# Patient Record
Sex: FEMALE | Race: WHITE | NOT HISPANIC OR LATINO | Employment: UNEMPLOYED | URBAN - METROPOLITAN AREA
[De-identification: names, ages, dates, MRNs, and addresses within clinical notes are randomized per-mention and may not be internally consistent; named-entity substitution may affect disease eponyms.]

---

## 2017-03-14 ENCOUNTER — HISTORICAL (OUTPATIENT)
Dept: FAMILY MEDICINE | Facility: CLINIC | Age: 72
End: 2017-03-14

## 2017-03-23 ENCOUNTER — HISTORICAL (OUTPATIENT)
Dept: ENDOSCOPY | Facility: HOSPITAL | Age: 72
End: 2017-03-23

## 2017-03-23 LAB — CRC RECOMMENDATION EXT: NORMAL

## 2017-04-17 ENCOUNTER — HISTORICAL (OUTPATIENT)
Dept: ADMINISTRATIVE | Facility: HOSPITAL | Age: 72
End: 2017-04-17

## 2017-05-23 ENCOUNTER — HISTORICAL (OUTPATIENT)
Dept: MEDSURG UNIT | Facility: HOSPITAL | Age: 72
End: 2017-05-23

## 2017-06-16 ENCOUNTER — HISTORICAL (OUTPATIENT)
Dept: ADMINISTRATIVE | Facility: HOSPITAL | Age: 72
End: 2017-06-16

## 2017-06-16 LAB
ABS NEUT (OLG): 2.46 X10(3)/MCL (ref 2.1–9.2)
ALBUMIN SERPL-MCNC: 4.3 GM/DL (ref 3.4–5)
ALBUMIN/GLOB SERPL: 1 {RATIO}
ALP SERPL-CCNC: 106 UNIT/L (ref 20–120)
ALT SERPL-CCNC: 21 UNIT/L
AST SERPL-CCNC: 26 UNIT/L
BASOPHILS # BLD AUTO: 0.03 X10(3)/MCL
BASOPHILS NFR BLD AUTO: 0 % (ref 0–1)
BILIRUB SERPL-MCNC: 1 MG/DL
BILIRUBIN DIRECT+TOT PNL SERPL-MCNC: 0.1 MG/DL
BILIRUBIN DIRECT+TOT PNL SERPL-MCNC: 0.9 MG/DL
BUN SERPL-MCNC: 19 MG/DL (ref 7–25)
CALCIUM SERPL-MCNC: 9.4 MG/DL (ref 8.4–10.3)
CHLORIDE SERPL-SCNC: 106 MMOL/L (ref 96–110)
CO2 SERPL-SCNC: 26 MMOL/L (ref 24–32)
CREAT SERPL-MCNC: 0.65 MG/DL (ref 0.7–1.1)
DEPRECATED CALCIDIOL+CALCIFEROL SERPL-MC: 20.78 NG/ML (ref 30–80)
EOSINOPHIL # BLD AUTO: 0.17 10*3/UL
EOSINOPHIL NFR BLD AUTO: 3 % (ref 0–5)
ERYTHROCYTE [DISTWIDTH] IN BLOOD BY AUTOMATED COUNT: 12.7 % (ref 11.5–14.5)
GLOBULIN SER-MCNC: 3.1 GM/ML (ref 2.3–3.5)
GLUCOSE SERPL-MCNC: 95 MG/DL (ref 65–99)
HCT VFR BLD AUTO: 40.3 % (ref 35–46)
HGB BLD-MCNC: 13.1 GM/DL (ref 12–16)
IMM GRANULOCYTES # BLD AUTO: 0.01 10*3/UL
IMM GRANULOCYTES NFR BLD AUTO: 0 %
LYMPHOCYTES # BLD AUTO: 3.09 X10(3)/MCL
LYMPHOCYTES NFR BLD AUTO: 50 % (ref 15–40)
MCH RBC QN AUTO: 29.2 PG (ref 26–34)
MCHC RBC AUTO-ENTMCNC: 32.5 GM/DL (ref 31–37)
MCV RBC AUTO: 89.8 FL (ref 80–100)
MONOCYTES # BLD AUTO: 0.38 X10(3)/MCL
MONOCYTES NFR BLD AUTO: 6 % (ref 4–12)
NEUTROPHILS # BLD AUTO: 2.46 X10(3)/MCL
NEUTROPHILS NFR BLD AUTO: 40 X10(3)/MCL
PLATELET # BLD AUTO: 215 X10(3)/MCL (ref 130–400)
PMV BLD AUTO: 10.2 FL (ref 7.4–10.4)
POTASSIUM SERPL-SCNC: 4.1 MMOL/L (ref 3.6–5.2)
PROT SERPL-MCNC: 7.4 GM/DL (ref 6–8)
RBC # BLD AUTO: 4.49 X10(6)/MCL (ref 4–5.2)
SODIUM SERPL-SCNC: 140 MMOL/L (ref 135–146)
WBC # SPEC AUTO: 6.1 X10(3)/MCL (ref 4.5–11)

## 2017-06-30 ENCOUNTER — HISTORICAL (OUTPATIENT)
Dept: FAMILY MEDICINE | Facility: CLINIC | Age: 72
End: 2017-06-30

## 2017-09-14 ENCOUNTER — HISTORICAL (OUTPATIENT)
Dept: FAMILY MEDICINE | Facility: CLINIC | Age: 72
End: 2017-09-14

## 2017-11-03 ENCOUNTER — HISTORICAL (OUTPATIENT)
Dept: ADMINISTRATIVE | Facility: HOSPITAL | Age: 72
End: 2017-11-03

## 2017-11-03 LAB — DEPRECATED CALCIDIOL+CALCIFEROL SERPL-MC: 36.41 NG/ML (ref 30–80)

## 2017-12-18 ENCOUNTER — HISTORICAL (OUTPATIENT)
Dept: ADMINISTRATIVE | Facility: HOSPITAL | Age: 72
End: 2017-12-18

## 2017-12-18 ENCOUNTER — HISTORICAL (OUTPATIENT)
Dept: RADIOLOGY | Facility: HOSPITAL | Age: 72
End: 2017-12-18

## 2017-12-18 LAB
ABS NEUT (OLG): 3.63 X10(3)/MCL (ref 2.1–9.2)
ALBUMIN SERPL-MCNC: 4.1 GM/DL (ref 3.4–5)
ALBUMIN/GLOB SERPL: 1 RATIO (ref 1–2)
ALP SERPL-CCNC: 138 UNIT/L (ref 45–117)
ALT SERPL-CCNC: 33 UNIT/L (ref 12–78)
AST SERPL-CCNC: 26 UNIT/L (ref 15–37)
BASOPHILS # BLD AUTO: 0.04 X10(3)/MCL
BASOPHILS NFR BLD AUTO: 1 % (ref 0–1)
BILIRUB SERPL-MCNC: 0.8 MG/DL (ref 0.2–1)
BILIRUBIN DIRECT+TOT PNL SERPL-MCNC: 0.2 MG/DL
BILIRUBIN DIRECT+TOT PNL SERPL-MCNC: 0.6 MG/DL
BUN SERPL-MCNC: 18 MG/DL (ref 7–18)
CALCIUM SERPL-MCNC: 9.1 MG/DL (ref 8.5–10.1)
CHLORIDE SERPL-SCNC: 106 MMOL/L (ref 98–107)
CK MB SERPL-MCNC: 3.3 NG/ML (ref 1–3.6)
CK SERPL-CCNC: 163 UNIT/L (ref 26–192)
CO2 SERPL-SCNC: 28 MMOL/L (ref 21–32)
CREAT SERPL-MCNC: 0.7 MG/DL (ref 0.6–1.3)
EOSINOPHIL # BLD AUTO: 0.12 X10(3)/MCL
EOSINOPHIL NFR BLD AUTO: 2 % (ref 0–5)
ERYTHROCYTE [DISTWIDTH] IN BLOOD BY AUTOMATED COUNT: 13.2 % (ref 11.5–14.5)
GLOBULIN SER-MCNC: 4 GM/ML (ref 2.3–3.5)
GLUCOSE SERPL-MCNC: 94 MG/DL (ref 74–106)
HCT VFR BLD AUTO: 44.7 % (ref 35–46)
HGB BLD-MCNC: 14.5 GM/DL (ref 12–16)
IMM GRANULOCYTES # BLD AUTO: 0.02 10*3/UL
IMM GRANULOCYTES NFR BLD AUTO: 0 %
LYMPHOCYTES # BLD AUTO: 2.97 X10(3)/MCL
LYMPHOCYTES NFR BLD AUTO: 41 % (ref 15–40)
MAGNESIUM SERPL-MCNC: 2.8 MG/DL (ref 1.8–2.4)
MCH RBC QN AUTO: 30 PG (ref 26–34)
MCHC RBC AUTO-ENTMCNC: 32.4 GM/DL (ref 31–37)
MCV RBC AUTO: 92.5 FL (ref 80–100)
MONOCYTES # BLD AUTO: 0.47 X10(3)/MCL
MONOCYTES NFR BLD AUTO: 6 % (ref 4–12)
NEUTROPHILS # BLD AUTO: 3.63 X10(3)/MCL
NEUTROPHILS NFR BLD AUTO: 50 X10(3)/MCL
PLATELET # BLD AUTO: 268 X10(3)/MCL (ref 130–400)
PMV BLD AUTO: 10 FL (ref 7.4–10.4)
POTASSIUM SERPL-SCNC: 4.3 MMOL/L (ref 3.5–5.1)
PROT SERPL-MCNC: 8.1 GM/DL (ref 6.4–8.2)
RBC # BLD AUTO: 4.83 X10(6)/MCL (ref 4–5.2)
SODIUM SERPL-SCNC: 140 MMOL/L (ref 136–145)
TROPONIN I SERPL-MCNC: <0.015 NG/ML (ref 0–0.05)
WBC # SPEC AUTO: 7.2 X10(3)/MCL (ref 4.5–11)

## 2018-02-01 ENCOUNTER — HISTORICAL (OUTPATIENT)
Dept: ADMINISTRATIVE | Facility: HOSPITAL | Age: 73
End: 2018-02-01

## 2018-03-01 ENCOUNTER — HISTORICAL (OUTPATIENT)
Dept: ADMINISTRATIVE | Facility: HOSPITAL | Age: 73
End: 2018-03-01

## 2018-03-01 LAB
ALBUMIN SERPL-MCNC: 3.9 GM/DL (ref 3.4–5)
ALBUMIN/GLOB SERPL: 1 RATIO (ref 1–2)
ALP SERPL-CCNC: 132 UNIT/L (ref 45–117)
ALT SERPL-CCNC: 29 UNIT/L (ref 12–78)
AST SERPL-CCNC: 18 UNIT/L (ref 15–37)
BILIRUB SERPL-MCNC: 0.7 MG/DL (ref 0.2–1)
BILIRUBIN DIRECT+TOT PNL SERPL-MCNC: 0.2 MG/DL
BILIRUBIN DIRECT+TOT PNL SERPL-MCNC: 0.5 MG/DL
BUN SERPL-MCNC: 26 MG/DL (ref 7–18)
CALCIUM SERPL-MCNC: 9.4 MG/DL (ref 8.5–10.1)
CHLORIDE SERPL-SCNC: 106 MMOL/L (ref 98–107)
CO2 SERPL-SCNC: 29 MMOL/L (ref 21–32)
CREAT SERPL-MCNC: 0.7 MG/DL (ref 0.6–1.3)
ERYTHROCYTE [DISTWIDTH] IN BLOOD BY AUTOMATED COUNT: 13.1 % (ref 11.5–14.5)
GLOBULIN SER-MCNC: 3.8 GM/ML (ref 2.3–3.5)
GLUCOSE SERPL-MCNC: 82 MG/DL (ref 74–106)
HCT VFR BLD AUTO: 39.9 % (ref 35–46)
HGB BLD-MCNC: 12.6 GM/DL (ref 12–16)
MCH RBC QN AUTO: 29.4 PG (ref 26–34)
MCHC RBC AUTO-ENTMCNC: 31.6 GM/DL (ref 31–37)
MCV RBC AUTO: 93 FL (ref 80–100)
PLATELET # BLD AUTO: 251 X10(3)/MCL (ref 130–400)
PMV BLD AUTO: 10.7 FL (ref 7.4–10.4)
POTASSIUM SERPL-SCNC: 4.3 MMOL/L (ref 3.5–5.1)
PROT SERPL-MCNC: 7.7 GM/DL (ref 6.4–8.2)
RBC # BLD AUTO: 4.29 X10(6)/MCL (ref 4–5.2)
SODIUM SERPL-SCNC: 143 MMOL/L (ref 136–145)
WBC # SPEC AUTO: 6.4 X10(3)/MCL (ref 4.5–11)

## 2018-03-21 ENCOUNTER — HISTORICAL (OUTPATIENT)
Dept: FAMILY MEDICINE | Facility: CLINIC | Age: 73
End: 2018-03-21

## 2018-05-16 ENCOUNTER — HISTORICAL (OUTPATIENT)
Dept: ADMINISTRATIVE | Facility: HOSPITAL | Age: 73
End: 2018-05-16

## 2018-05-16 LAB
ALBUMIN SERPL-MCNC: 3.8 GM/DL (ref 3.4–5)
ALBUMIN/GLOB SERPL: 1 RATIO (ref 1–2)
ALP SERPL-CCNC: 128 UNIT/L (ref 45–117)
ALT SERPL-CCNC: 28 UNIT/L (ref 12–78)
AST SERPL-CCNC: 23 UNIT/L (ref 15–37)
BILIRUB SERPL-MCNC: 0.6 MG/DL (ref 0.2–1)
BILIRUBIN DIRECT+TOT PNL SERPL-MCNC: 0.2 MG/DL
BILIRUBIN DIRECT+TOT PNL SERPL-MCNC: 0.4 MG/DL
BUN SERPL-MCNC: 19 MG/DL (ref 7–18)
CALCIUM SERPL-MCNC: 8.8 MG/DL (ref 8.5–10.1)
CHLORIDE SERPL-SCNC: 108 MMOL/L (ref 98–107)
CO2 SERPL-SCNC: 28 MMOL/L (ref 21–32)
CREAT SERPL-MCNC: 0.7 MG/DL (ref 0.6–1.3)
ERYTHROCYTE [DISTWIDTH] IN BLOOD BY AUTOMATED COUNT: 13.5 % (ref 11.5–14.5)
GLOBULIN SER-MCNC: 3.7 GM/ML (ref 2.3–3.5)
GLUCOSE SERPL-MCNC: 108 MG/DL (ref 74–106)
HCT VFR BLD AUTO: 38.2 % (ref 35–46)
HGB BLD-MCNC: 12.1 GM/DL (ref 12–16)
MCH RBC QN AUTO: 29.7 PG (ref 26–34)
MCHC RBC AUTO-ENTMCNC: 31.7 GM/DL (ref 31–37)
MCV RBC AUTO: 93.9 FL (ref 80–100)
PLATELET # BLD AUTO: 241 X10(3)/MCL (ref 130–400)
PMV BLD AUTO: 10.5 FL (ref 7.4–10.4)
POTASSIUM SERPL-SCNC: 3.7 MMOL/L (ref 3.5–5.1)
PROT SERPL-MCNC: 7.5 GM/DL (ref 6.4–8.2)
RBC # BLD AUTO: 4.07 X10(6)/MCL (ref 4–5.2)
SODIUM SERPL-SCNC: 141 MMOL/L (ref 136–145)
WBC # SPEC AUTO: 4.7 X10(3)/MCL (ref 4.5–11)

## 2018-05-21 ENCOUNTER — HISTORICAL (OUTPATIENT)
Dept: SURGERY | Facility: HOSPITAL | Age: 73
End: 2018-05-21

## 2018-06-28 ENCOUNTER — HISTORICAL (OUTPATIENT)
Dept: RADIOLOGY | Facility: HOSPITAL | Age: 73
End: 2018-06-28

## 2018-06-28 ENCOUNTER — HISTORICAL (OUTPATIENT)
Dept: ADMINISTRATIVE | Facility: HOSPITAL | Age: 73
End: 2018-06-28

## 2019-03-26 ENCOUNTER — HISTORICAL (OUTPATIENT)
Dept: ADMINISTRATIVE | Facility: HOSPITAL | Age: 74
End: 2019-03-26

## 2019-05-15 ENCOUNTER — HISTORICAL (OUTPATIENT)
Dept: ADMINISTRATIVE | Facility: HOSPITAL | Age: 74
End: 2019-05-15

## 2019-10-11 ENCOUNTER — HISTORICAL (OUTPATIENT)
Dept: OPHTHALMOLOGY | Facility: CLINIC | Age: 74
End: 2019-10-11

## 2019-10-11 LAB
EST. AVERAGE GLUCOSE BLD GHB EST-MCNC: 117 MG/DL
HBA1C MFR BLD: 5.7 % (ref 4.2–6.3)

## 2019-11-06 ENCOUNTER — HISTORICAL (OUTPATIENT)
Dept: RADIOLOGY | Facility: HOSPITAL | Age: 74
End: 2019-11-06

## 2020-08-23 ENCOUNTER — HISTORICAL (OUTPATIENT)
Dept: ADMINISTRATIVE | Facility: HOSPITAL | Age: 75
End: 2020-08-23

## 2020-08-29 LAB — FINAL CULTURE: NORMAL

## 2020-09-19 ENCOUNTER — HISTORICAL (OUTPATIENT)
Dept: LAB | Facility: HOSPITAL | Age: 75
End: 2020-09-19

## 2020-10-12 ENCOUNTER — HISTORICAL (OUTPATIENT)
Dept: RADIOLOGY | Facility: HOSPITAL | Age: 75
End: 2020-10-12

## 2020-11-17 ENCOUNTER — HISTORICAL (OUTPATIENT)
Dept: ADMINISTRATIVE | Facility: HOSPITAL | Age: 75
End: 2020-11-17

## 2020-12-10 ENCOUNTER — HISTORICAL (OUTPATIENT)
Dept: ADMINISTRATIVE | Facility: HOSPITAL | Age: 75
End: 2020-12-10

## 2020-12-17 ENCOUNTER — HISTORICAL (OUTPATIENT)
Dept: ADMINISTRATIVE | Facility: HOSPITAL | Age: 75
End: 2020-12-17

## 2020-12-17 LAB
DEPRECATED CALCIDIOL+CALCIFEROL SERPL-MC: 39.6 NG/ML (ref 30–80)
EST. AVERAGE GLUCOSE BLD GHB EST-MCNC: 114 MG/DL
HAV IGM SERPL QL IA: NONREACTIVE
HBA1C MFR BLD: 5.6 %
HBV CORE IGM SERPL QL IA: NONREACTIVE
HBV SURFACE AG SERPL QL IA: NONREACTIVE
HCV AB SERPL QL IA: NONREACTIVE

## 2021-01-06 ENCOUNTER — HISTORICAL (OUTPATIENT)
Dept: RADIOLOGY | Facility: HOSPITAL | Age: 76
End: 2021-01-06

## 2021-07-02 ENCOUNTER — HISTORICAL (OUTPATIENT)
Dept: ADMINISTRATIVE | Facility: HOSPITAL | Age: 76
End: 2021-07-02

## 2021-07-02 LAB
ALBUMIN SERPL-MCNC: 4.2 GM/DL (ref 3.4–4.8)
ALBUMIN/GLOB SERPL: 1.2 RATIO (ref 1.1–2)
ALP SERPL-CCNC: 110 UNIT/L (ref 40–150)
ALT SERPL-CCNC: 25 UNIT/L (ref 0–55)
AST SERPL-CCNC: 26 UNIT/L (ref 5–34)
BILIRUB SERPL-MCNC: 1.5 MG/DL
BILIRUBIN DIRECT+TOT PNL SERPL-MCNC: 0.5 MG/DL (ref 0–0.5)
BILIRUBIN DIRECT+TOT PNL SERPL-MCNC: 1 MG/DL (ref 0–0.8)
BUN SERPL-MCNC: 15.8 MG/DL (ref 9.8–20.1)
BUN SERPL-MCNC: 15.9 MG/DL (ref 9.8–20.1)
CALCIUM SERPL-MCNC: 10 MG/DL (ref 8.4–10.2)
CALCIUM SERPL-MCNC: 10 MG/DL (ref 8.4–10.2)
CHLORIDE SERPL-SCNC: 105 MMOL/L (ref 98–107)
CHLORIDE SERPL-SCNC: 106 MMOL/L (ref 98–107)
CHOLEST SERPL-MCNC: 177 MG/DL
CHOLEST/HDLC SERPL: 3 {RATIO} (ref 0–5)
CK SERPL-CCNC: 194 U/L (ref 29–168)
CO2 SERPL-SCNC: 28 MMOL/L (ref 23–31)
CO2 SERPL-SCNC: 28 MMOL/L (ref 23–31)
CREAT SERPL-MCNC: 0.7 MG/DL (ref 0.55–1.02)
CREAT SERPL-MCNC: 0.72 MG/DL (ref 0.55–1.02)
CREAT/UREA NIT SERPL: 23
DEPRECATED CALCIDIOL+CALCIFEROL SERPL-MC: 50.8 NG/ML (ref 30–80)
GLOBULIN SER-MCNC: 3.5 GM/DL (ref 2.4–3.5)
GLUCOSE SERPL-MCNC: 93 MG/DL (ref 82–115)
GLUCOSE SERPL-MCNC: 93 MG/DL (ref 82–115)
HDLC SERPL-MCNC: 57 MG/DL (ref 35–60)
LDLC SERPL CALC-MCNC: 101 MG/DL (ref 50–140)
MAGNESIUM SERPL-MCNC: 2.4 MG/DL (ref 1.6–2.6)
POTASSIUM SERPL-SCNC: 3.7 MMOL/L (ref 3.5–5.1)
POTASSIUM SERPL-SCNC: 3.7 MMOL/L (ref 3.5–5.1)
PROT SERPL-MCNC: 7.7 GM/DL (ref 5.8–7.6)
SODIUM SERPL-SCNC: 142 MMOL/L (ref 136–145)
SODIUM SERPL-SCNC: 143 MMOL/L (ref 136–145)
TRIGL SERPL-MCNC: 94 MG/DL (ref 37–140)
URATE SERPL-MCNC: 3.6 MG/DL (ref 2.6–6)
VLDLC SERPL CALC-MCNC: 19 MG/DL

## 2021-08-31 LAB — NONINV COLON CA DNA+OCC BLD SCRN STL QL: NEGATIVE

## 2021-10-14 ENCOUNTER — HISTORICAL (OUTPATIENT)
Dept: ADMINISTRATIVE | Facility: HOSPITAL | Age: 76
End: 2021-10-14

## 2021-10-14 LAB — TSH SERPL-ACNC: 1.72 UIU/ML (ref 0.35–4.94)

## 2022-04-11 ENCOUNTER — HISTORICAL (OUTPATIENT)
Dept: ADMINISTRATIVE | Facility: HOSPITAL | Age: 77
End: 2022-04-11
Payer: MEDICARE

## 2022-04-26 VITALS
BODY MASS INDEX: 36.15 KG/M2 | SYSTOLIC BLOOD PRESSURE: 150 MMHG | OXYGEN SATURATION: 98 % | HEIGHT: 62 IN | DIASTOLIC BLOOD PRESSURE: 77 MMHG | WEIGHT: 196.44 LBS

## 2022-04-30 NOTE — H&P
* Final Report *  Document Contains Addenda  [facility] Clinic Note     Patient:   Nu Denny             MRN: [id number]            FIN: [id number]               Age:   72 years     Sex:  Female     :  1945   Associated Diagnoses:   HTN (hypertension)   Author:   Lee ADAN, Gordon CASTELLON      CC: Here for eye surgery risk assessment    HPI:   Pt is 71yo WF with h/o HTN, seen last week by PCP for clearance for surgery to correct her right epiretinal membrane, scheduled for 2017. Pt had uncontrolled BP at previous visit, valsartan doubled at that time to 160mg daily. Pt has kept BPs at home, majority were < 140/85. Today manual BP level was 142/86. Pt denies any CV sxs including dizziness, blurry vision, HA, weakness, syncope, CP, SOB, N/V. Pt states she feels fine on new dose of valsartan, no other major complaints.            Review of Systems   Eye:  No blurring, No double vision.    Respiratory:  No shortness of breath, No cough.    Cardiovascular:  No chest pain.    Gastrointestinal:  No nausea, No vomiting, No diarrhea, No constipation.    Musculoskeletal:  No joint pain, No muscle pain.    Neurologic:  No headache.       Health Status   Current medications:    Home Medications (11) Active  amitriptyline 25 mg oral tablet 25 mg = 1 tab(s), Oral, Once a day (at bedtime)  aspirin 81 mg oral tablet 81 mg = 1 tab(s), Oral, Daily  atorvastatin 20 mg oral tablet 20 mg = 1 tab(s), Oral, Daily   mg-65 mg oral powder for reconstitution 1 packet(s), PRN, Oral, q6hr  buPROPion 150 mg/12 hours (SR) oral tablet, extended release 150 mg = 1 tab(s), Oral, Daily  Docusil 100 mg oral capsule 100 mg = 1 cap(s), Oral, Daily  NexIUM 40 mg oral delayed release capsule 40 mg = 1 cap(s), Oral, Daily  nitroglycerin 0.4 mg sublingual TAB 0.4 mg = 1 tab(s), PRN, SL, q5min  raNITIdine 150 mg oral capsule 150 mg = 1 cap(s), Oral, BID  valsartan 160 mg oral tablet 160 mg = 1 tab(s), Oral, Daily  Vitamin D3 1000 intl units  oral tablet 1,000 IntUnit = 1 tab(s), Oral, Daily        Physical Examination   Vital Signs   5/22/2017 8:20 CDT       Temperature Oral          36.4 DegC                             Peripheral Pulse Rate     82 bpm                             Respiratory Rate          18 br/min                             SpO2                      96 %                             Systolic Blood Pressure   142 mmHg  HI  (Modified)                            Diastolic Blood Pressure  83 mmHg     General:  Alert and oriented, No acute distress.    Eye:  Pupils are equal, round and reactive to light, Normal conjunctiva.    HENT:  Oral mucosa is moist.    Respiratory:  Lungs are clear to auscultation, Respirations are non-labored, Breath sounds are equal, Symmetrical chest wall expansion.    Cardiovascular:  Normal rate, Regular rhythm, No murmur, Normal peripheral perfusion, No edema.    Gastrointestinal:  Soft, Non-tender, Non-distended.    Integumentary:  Warm, Dry.    Neurologic:  Alert, Oriented, No focal deficits.    Cognition and Speech:  Oriented.    Psychiatric:  Cooperative, Appropriate mood & affect, Normal judgment.       Health Maintenance      Impression and Plan   Diagnosis     HTN (hypertension) (QML23-YD I10).       1. Pt is considered at low risk for low risk surgery, BP better controlled at this time.  2. Advised pt to call eye clinic in order to determine when she is supposed to be at hospital tomorrow morning for procedure.  3. f/u with regular PCP in 1 month.      Addendum by Jakub Frederick MD on May 22, 2017 9:37 CDT (Verified)  [ x ] I discussed the case with the resident and agree with the findings and plan as documented in the resident's note.    Result type: Office/Clinic Note-Physician  Result date: May 22, 2017 9:27 CDT  Result status: Modified  Result title: [facility] Clinic Note  Performed by: Gordon Howard MD on May 22, 2017 9:31 CDT  Verified by: Gordon Howard MD on May 22, 2017 9:31 CDT  Encounter  info: [id number], [facility], Clinic Visit, 5/22/2017 - 5/22/2017    Doc has been moved by HIM specialist.

## 2022-04-30 NOTE — OP NOTE
Patient:   Nu Denny             MRN: [id number]            FIN: [id number]               Age:   72 years     Sex:  Female     :  1945   Associated Diagnoses:   None   Author:   Bing Funes MD      Operative Note   Operative Information   Description of Procedure/Findings/    Complications: OPERATIVE NOTE FOR TRACTIONAL RETINAL DETACHMENT  PREOPERATIVE DIAGNOSIS:  1. Visually significant epiretinal membrane, right eye  POSTOPERATIVE DIAGNOSIS:  same  PROCEDURE:    1. 25g Pars plana vitrectomy  2. Epiretinal membrane peel  3. Internal Limiting membrane peel  4.  Fluid air exchange, all right eye   SURGEON:  Bing Funes MD, MPH  Assistant: Leyla Gamboa MD   ATTENDING: Nikolay Berumen MD   COMPLICATIONS: None.   SPECIMENS: None   ANESTHESIA: General endotracheal anesthesia.   CONDITION: Stable.   ESTIMATED BLOOD LOSS: Less than 1 mL.   PREOPERATIVE FINDINGS:   The patient had presented with a large retained nuclear lens fragment after cataract surgery. Prior to the surgery, the risks and benefits were discussed at length with the patient, stressing that visual outcome could not be guaranteed and informed consent was obtained.   PROCEDURE IN DETAIL:   The patient was brought to the operating room and placed under general anesthesia. The eye was then prepped and draped in sterile fashion using a 5% Betadine solution.   A 25g Pars Plana Vitrectomy set up.  The ports were placed 3.5 mm away from the limbus in the inferotemporal, superotemporal and superonasal quadrants. An infusion cannula was attached to the inferotemporal port.   Lightpipe and vitrector were inserted into the superior ports and vitrectomy was begun. Vitrectomy was performed and the posterior hyaloid face was removed. The flat macula lens was then put into place.  Triesence was injected into the vitreal cavity to stain the epiretinal membrane which was then peel using intraocular forceps.  The internal limiting membrane was  then stained with Brilliant Blue and peeled using intraocular forceps.      360 depressed exam was performed and the retina was found to be attached.  A fluid air exchange was performed. The  ports were removed with wounds closed with light cautery.  Topical Drops of pred forte, vigamox, atropine, and maxitrol ointment were placed to the eye and the eye was patched and shielded. The patient will follow up in the eye clinic tomorrow for post op day one follow up.      .

## 2022-04-30 NOTE — H&P
Patient:   Nu Denny             MRN: [id number]            FIN: [id number]               Age:   72 years     Sex:  Female     :  1945   Associated Diagnoses:   None   Author:   Sonja Morgan MD      Health Status   The H&P was reviewed, the patient was examined, and there are no changes to the patient's condition..Plan for pars plana vitrectomy with membrane peel and fluid air exchange with gas tamponade of the right eye today 2017 in OR.

## 2022-04-30 NOTE — H&P
* Final Report *  Document Contains Addenda  Pre-op H&P     Patient:   Steffany Denny            MRN: [id number]            FIN: [id number]               Age:   73 years     Sex:  Female     :  1945   Associated Diagnoses:   None   Author:   Mp Kuhn MD      [facility] H&P Update     HPI:  Patient is a 73-year-old female who is well-known to our clinic who previously presented for evaluation for laparoscopic cholecystectomy for symptomatic cholelithiasis. She has noted right upper quadrant pain beginning 2017. At this time she was found to have symptomatic cholelithiasis and was discharged home. She does continue to have episodes of right upper quadrant and epigastric pain a couple times a week depending on foods that she has consumed. At times there is associated nausea. She has made aggressive diet changes to temporize issue.  PMH: Anxiety, GERD (gastroesophageal reflux disease), Hyperlipidemia, Hypertension, Rheumatoid arthritis  PSH: Tonsillectomy, shoulder repair, hysterectomy, vitrectomy  Allergies: Codeine syrup (rash), states it is okay for her to have opioid pain medicines  Meds: Amitriptyline, aspirin 81, atorvastatin, bupropion, Nexium, ranitidine, valsartan, vitamin D  Social: non smoker, no alcohol, denies illicits     Objective:  Afebrile, vitals within normal limits     Physical Exam:  Gen: NAD, alert  CV: RRR  Chest: CTAB  Abd: s/nt/nd.  No previous abdominal surgery scars  2+pulses distally     Labs: No new labs  Imaging: No new     Assessment/Plan:  Patient is a 73-year-old female with symptomatic cholelithiasis for 5 months  - Proceed to OR for laparoscopic cholecystectomy  - Consents obtained     Mp Kuhn MD  [facility], PGY-I      Addendum by Gurpreet Carreon MD on May 21, 2018 8:21 CDT (Verified)  Pt's Hx and assessment reviewed and discussed with resident.  Pt appropriately prepared for Lap Loretta.    Result type: Office/Clinic  Note-Physician  Result date: May 21, 2018 5:33 CDT  Result status: Modified  Result title: Pre-op H&P  Performed by: Mp Kuhn MD on May 21, 2018 5:39 CDT  Verified by: Mp Kuhn MD on May 21, 2018 5:39 CDT  Encounter info: [id number], [facility], Day Surgery, 5/21/2018 - 5/21/2018    Doc has been moved by HIM specialist to the correct doc type.

## 2022-04-30 NOTE — DISCHARGE SUMMARY
Patient:   Nu Denny             MRN: [id number]            FIN: [id number]               Age:   72 years     Sex:  Female     :  1945   Associated Diagnoses:   None   Author:   Sonja Morgan MD      DISCHARGE SUMMARY    Date Admitted: 2017    Date Discharged: Same    Chief Complaint: Blurred vision OD    Significant findings:   Physical Examination: Epiretinal membrane OD   Laboratory: None   X-ray: None   Other Diagnostic Procedures: None    Principal Diagnosis: Epiretinal membrane OD    Other Significant Diagnosis: None    Operations/Procedures:   1) Pars Plana Vitrectomy, OD  2) Epiretinal membrane peel OD  3) Gas Fluid Exchange OD    Complications: None    Plan:   Follow-up: Next day at [facility] ophthalmology clinic   Medications: Resume home medications   Diet: Resume home diet   Level of Activity: No strenous activity    Discharge to: Home    Condition at Discharge: Stable

## 2022-05-04 NOTE — HISTORICAL [facility] CERNER
This is a historical note converted from [employer]. Formatting and pictures may have been removed.  Please reference [employer] for original formatting and attached multimedia. Indication for Surgery  73yF with RUQ pain and findings consistent with symptomatic cholethiasis.  Preoperative Diagnosis  Symptomatic cholethiasis  Postoperative Diagnosis  Symptomatic cholethiasis  Operation  Laparoscopic cholecystectomy  Surgeon(s)  MD Harriet  Assistant  MD Hattie  Anesthesia  GETA  Estimated Blood Loss  5cc  Findings  gallbladder removed without complication  Specimen(s)  gallbladder  Complications  none  Technique  Pt was taking to operative suite and placed in supine position. Anesthesia was induced and pt was intubated without event. A small incision was made supraumbilical large enough for a 10mm trochar. A 10mm optiview trochar was advance with the camera into the abdominal cavity and pneumoperitoneum was achieved. The abdominal cavity was inspected in 360degrees and there were no obvious trochar injuries or pathology.? 2 additional trochars were placed in the RUQ under direct visualization and a 10mm trochar was placed subxiphoid. The fundus of the gallbladder was grasped. There were some omental adhesions to the gallbladder that were dissected bluntly. The infundibulum was grasped and retracted laterally. Calot triangle was dissected using blunt and electrocuatery. The cystic duct and cystic artery were identified and skeletonized. Critical view was obtained. The duct and artery were controlled with proximal and distal clips and the then transected. The gallbladder was then removed from the liver bed with electrocautery.? Hemostasis was achieved and the clips remained intact. The gallbladder was removed with an endocatch bag. Pneumoperitoneum was decompressed.? the fasical defects at the 10mm trochar sites were closed with vicryl. The skin was then closed with 4.0 PDS. Pt was extubated and taking to the PACU without  event.   This certifies I was present during the entire period between opening and closing of the surgical field.

## 2022-06-09 ENCOUNTER — PATIENT OUTREACH (OUTPATIENT)
Dept: ADMINISTRATIVE | Facility: HOSPITAL | Age: 77
End: 2022-06-09
Payer: MEDICARE

## 2022-06-09 NOTE — PROGRESS NOTES
Population Health Outreach.Records Received, hyper-linked into chart at this time. The following record(s)  below were uploaded for Health Maintenance .             8/31/2021 GIANA

## 2022-06-13 ENCOUNTER — OFFICE VISIT (OUTPATIENT)
Dept: FAMILY MEDICINE | Facility: CLINIC | Age: 77
End: 2022-06-13
Payer: MEDICARE

## 2022-06-13 VITALS
RESPIRATION RATE: 19 BRPM | OXYGEN SATURATION: 96 % | WEIGHT: 199.19 LBS | HEIGHT: 62 IN | TEMPERATURE: 98 F | SYSTOLIC BLOOD PRESSURE: 152 MMHG | DIASTOLIC BLOOD PRESSURE: 87 MMHG | HEART RATE: 69 BPM | BODY MASS INDEX: 36.65 KG/M2

## 2022-06-13 DIAGNOSIS — M85.859 OSTEOPENIA OF NECK OF FEMUR, UNSPECIFIED LATERALITY: ICD-10-CM

## 2022-06-13 DIAGNOSIS — F41.9 ANXIETY: ICD-10-CM

## 2022-06-13 DIAGNOSIS — M79.10 MYALGIA: ICD-10-CM

## 2022-06-13 DIAGNOSIS — I10 HYPERTENSION, UNSPECIFIED TYPE: Primary | ICD-10-CM

## 2022-06-13 DIAGNOSIS — R53.81 PHYSICAL DECONDITIONING: ICD-10-CM

## 2022-06-13 DIAGNOSIS — N39.46 MIXED STRESS AND URGE URINARY INCONTINENCE: ICD-10-CM

## 2022-06-13 DIAGNOSIS — E78.5 HYPERLIPIDEMIA, UNSPECIFIED HYPERLIPIDEMIA TYPE: ICD-10-CM

## 2022-06-13 DIAGNOSIS — K21.9 GASTROESOPHAGEAL REFLUX DISEASE, UNSPECIFIED WHETHER ESOPHAGITIS PRESENT: ICD-10-CM

## 2022-06-13 DIAGNOSIS — N81.10 FEMALE BLADDER PROLAPSE: ICD-10-CM

## 2022-06-13 DIAGNOSIS — F32.A DEPRESSION, UNSPECIFIED DEPRESSION TYPE: ICD-10-CM

## 2022-06-13 DIAGNOSIS — H34.8110 CENTRAL RETINAL VEIN OCCLUSION WITH MACULAR EDEMA OF RIGHT EYE: ICD-10-CM

## 2022-06-13 PROBLEM — R32 URINARY INCONTINENCE: Status: ACTIVE | Noted: 2022-06-13

## 2022-06-13 PROBLEM — H34.8112 CENTRAL RETINAL VEIN OCCLUSION OF RIGHT EYE: Status: ACTIVE | Noted: 2022-06-13

## 2022-06-13 PROBLEM — H26.9 CATARACT OF LEFT EYE: Status: ACTIVE | Noted: 2022-06-13

## 2022-06-13 LAB
ALBUMIN SERPL-MCNC: 4.2 GM/DL (ref 3.4–4.8)
ALBUMIN/GLOB SERPL: 1.4 RATIO (ref 1.1–2)
ALP SERPL-CCNC: 81 UNIT/L (ref 40–150)
ALT SERPL-CCNC: 22 UNIT/L (ref 0–55)
AST SERPL-CCNC: 20 UNIT/L (ref 5–34)
BILIRUBIN DIRECT+TOT PNL SERPL-MCNC: 1 MG/DL
BUN SERPL-MCNC: 23.1 MG/DL (ref 9.8–20.1)
CALCIUM SERPL-MCNC: 9.7 MG/DL (ref 8.4–10.2)
CHLORIDE SERPL-SCNC: 108 MMOL/L (ref 98–107)
CO2 SERPL-SCNC: 26 MMOL/L (ref 23–31)
CREAT SERPL-MCNC: 0.69 MG/DL (ref 0.55–1.02)
CREAT UR-MCNC: 123.4 MG/DL (ref 47–110)
GLOBULIN SER-MCNC: 3 GM/DL (ref 2.4–3.5)
GLUCOSE SERPL-MCNC: 95 MG/DL (ref 82–115)
MICROALBUMIN UR-MCNC: 8.3 UG/ML
MICROALBUMIN/CREAT RATIO PNL UR: 6.7 MG/GM CR (ref 0–30)
POTASSIUM SERPL-SCNC: 3.9 MMOL/L (ref 3.5–5.1)
PROT SERPL-MCNC: 7.2 GM/DL (ref 5.8–7.6)
SODIUM SERPL-SCNC: 143 MMOL/L (ref 136–145)

## 2022-06-13 PROCEDURE — 80053 COMPREHEN METABOLIC PANEL: CPT

## 2022-06-13 PROCEDURE — 82043 UR ALBUMIN QUANTITATIVE: CPT

## 2022-06-13 PROCEDURE — 96372 THER/PROPH/DIAG INJ SC/IM: CPT | Mod: PBBFAC

## 2022-06-13 PROCEDURE — 36415 COLL VENOUS BLD VENIPUNCTURE: CPT

## 2022-06-13 PROCEDURE — 99214 OFFICE O/P EST MOD 30 MIN: CPT | Mod: PBBFAC

## 2022-06-13 RX ORDER — LOSARTAN POTASSIUM 50 MG/1
50 TABLET ORAL DAILY
Qty: 90 TABLET | Refills: 0 | Status: SHIPPED | OUTPATIENT
Start: 2022-06-13 | End: 2022-10-13 | Stop reason: SDUPTHER

## 2022-06-13 RX ORDER — DOCUSATE SODIUM 100 MG/1
100 CAPSULE, LIQUID FILLED ORAL DAILY
COMMUNITY

## 2022-06-13 RX ORDER — FAMOTIDINE 20 MG/1
20 TABLET, FILM COATED ORAL 2 TIMES DAILY
COMMUNITY
Start: 2022-03-13 | End: 2022-06-13 | Stop reason: SDUPTHER

## 2022-06-13 RX ORDER — CALCIUM CARBONATE 500(1250)
1 TABLET,CHEWABLE ORAL DAILY
COMMUNITY

## 2022-06-13 RX ORDER — VITAMIN B COMPLEX
1 CAPSULE ORAL DAILY
COMMUNITY

## 2022-06-13 RX ORDER — CHOLECALCIFEROL (VITAMIN D3) 25 MCG
1000 TABLET ORAL DAILY
COMMUNITY

## 2022-06-13 RX ORDER — LOSARTAN POTASSIUM 50 MG/1
50 TABLET ORAL DAILY
COMMUNITY
Start: 2022-04-08 | End: 2022-06-13 | Stop reason: SDUPTHER

## 2022-06-13 RX ORDER — ATORVASTATIN CALCIUM 20 MG/1
20 TABLET, FILM COATED ORAL DAILY
Qty: 90 TABLET | Refills: 0 | Status: SHIPPED | OUTPATIENT
Start: 2022-06-13 | End: 2023-01-18 | Stop reason: SDUPTHER

## 2022-06-13 RX ORDER — ASPIRIN 81 MG/1
81 TABLET ORAL DAILY
COMMUNITY

## 2022-06-13 RX ORDER — FAMOTIDINE 20 MG/1
20 TABLET, FILM COATED ORAL 2 TIMES DAILY
Qty: 60 TABLET | Refills: 0 | Status: SHIPPED | OUTPATIENT
Start: 2022-06-13 | End: 2022-08-09 | Stop reason: SDUPTHER

## 2022-06-13 RX ORDER — ATORVASTATIN CALCIUM 20 MG/1
20 TABLET, FILM COATED ORAL DAILY
COMMUNITY
Start: 2022-06-06 | End: 2022-06-13 | Stop reason: SDUPTHER

## 2022-06-13 RX ADMIN — DENOSUMAB 60 MG: 60 INJECTION SUBCUTANEOUS at 10:06

## 2022-06-13 NOTE — PROGRESS NOTES
History & Physical  [facility]      Subjective:      Nu Denny is a 77 y.o. female with past medical history of HTN, HLD, CRVO R eye and Left Cataract, Insomnia, OA, GERD, Hx of Zenker Diverticulum, Osteopenia w/ Elevated Frax Score, Pelvic Organ Prolapse w/ Urinary Incontinence, who is presenting to clinic for routine f/u.    Acute complaints:     Dull/achy pain in R anterior thigh and R lower leg x 1-2 months. Using Arthritis Tylenol 500 mg BID w/ good relief. If doesn't take at night, can't sleep. Pain ranges from 3-7/10. Denies any numbness/tingling or burning sensation. Made worse with excessive activity, including walking. However, pain doesn't resolve after walking stopped or occur immediately w/ walking. Denies any falls or known injury. + Mild generalized weakness and occasional fatigue.  Denies any hip or knee pain. Has chronic L ankle pain 2/2 spur diagnosed in past, sometimes limps but not worse recently. Pt did not want any intervention/imgaing when offered in past or currently. Less active that in past, no longer gardening and more sedentary. Wears supportive shoes.     Chronic Complaints:     Osteopenia: Amenable to taking Prolia. Taking Vitamin D and Calcium. Couldn't take bisphosphonate 2/2 hx of zenker diverticulum. Still walks frequently, but decreased physical activity as noted above. No falls or gait instability. Has good home awareness/fall precautions in place.     Anxiety/Depression  Insomnia:    Mood has been low. High level of stress 2/2 media and current events, great grand child passed away, and managing house hold duties. Eating more bread which she does when stressed. Sleeping well w/ PRN Melatonin and Tylenol. Denies SI/HI. Doesn't want therapy of pharmacologic medications. Believes she just needs more adult company and social engagement, starting to go to sister in law house on Monday's for social outing.      HTN:   Taking Losartan 50 mg qd. Denies any CP, SOB,  light-headedness/dizziness, palpitations, tinnitus, distal numbness/tingling.     HLD:   Taking Atorvastatin 20 mg. Doesn't believe related to leg pain, as stopped in past w/ no improvement of pain. Would like to continue.     GERD   Taking Famotidine 20mg. Using Diet/Lifestyle modifications. Good symptomatic control.      Previous Palpitations:   Resolved. Never got called for Holter.     Incontinece and Bladder  Prolapse.   Has urge and stress symptoms. Wears diapers and pads during prolonged outings. Able to control w/ lifestyle and activity modifications. Denies recurrent or recent urinary tract infections or symptoms.     Chronic Eye Issues:   Continues to follow with Opthalmology. Undergoing multiple interventions to prevent vascular proliferation and further deterioration. Next appt 7/1/2022. R eye sight nearly gone, L sign intact.       HCM   MMG: Last Birads 2 in 2018. Pt wants MMG done in October. Has previously deferred.   Lung: Not indicated. Pt does had hx of lung nodules but size stable, last done in 2018. Pt asymptomatic, so never re-ordered.   Colon: Colonoscopy in 2017, was inadequate. They recommended serial FIT testing. Pt with negative FIT in 11/2021.   DEXA: Osteopenia as noted above.   Immunizations: UTD per chart review.     Review of Systems:   As noted in HPI.       Past Medical History:   Diagnosis Date    Anxiety 6/13/2022    Cataract of left eye 6/13/2022    Central retinal vein occlusion of right eye 6/13/2022    Female bladder prolapse 6/13/2022    Gastroesophageal reflux disease 6/13/2022    Hyperlipidemia 6/13/2022    Hypertension 6/13/2022    Osteopenia of neck of femur 6/13/2022    Urinary incontinence 6/13/2022       Past Surgical History:   Procedure Laterality Date    CHOLECYSTECTOMY      COLONOSCOPY  2017    HYSTERECTOMY      SHOULDER SURGERY  1995    TONSILLECTOMY      VITRECTOMY         History reviewed. No pertinent family history.    Social History  "    Socioeconomic History    Marital status:    Tobacco Use    Smoking status: Never Smoker    Smokeless tobacco: Never Used   Substance and Sexual Activity    Alcohol use: Never    Drug use: Never       Current Outpatient Medications   Medication Sig Dispense Refill    aspirin (ECOTRIN) 81 MG EC tablet Take 81 mg by mouth once daily.      b complex vitamins capsule Take 1 capsule by mouth once daily.      calcium carbonate (OS-SREEDHAR) 500 mg calcium (1,250 mg) chewable tablet Take 1 tablet by mouth once daily.      docusate sodium (COLACE) 100 MG capsule Take 100 mg by mouth once daily at 6am.      multivitamin capsule Take 1 capsule by mouth once daily.      vitamin D (VITAMIN D3) 1000 units Tab Take 1,000 Units by mouth once daily.      atorvastatin (LIPITOR) 20 MG tablet Take 1 tablet (20 mg total) by mouth once daily. 90 tablet 0    famotidine (PEPCID) 20 MG tablet Take 1 tablet (20 mg total) by mouth 2 (two) times daily. 60 tablet 0    losartan (COZAAR) 50 MG tablet Take 1 tablet (50 mg total) by mouth once daily. 90 tablet 0     No current facility-administered medications for this visit.       Review of patient's allergies indicates:   Allergen Reactions    Codeine Rash       Objective:   BP (!) 152/87 (BP Location: Left arm, Patient Position: Sitting, BP Method: X-Large (Automatic))   Pulse 69   Temp 98.2 °F (36.8 °C) (Oral)   Resp 19   Ht 5' 2" (1.575 m)   Wt 90.4 kg (199 lb 3.2 oz)   SpO2 96%   BMI 36.43 kg/m²      Physical Exam   Constitutional: She is oriented to person, place, and time. She appears obese. No distress.   HENT:   Head: Normocephalic and atraumatic.   Nose: Nose normal.   Mouth/Throat: Mucous membranes are moist. Oropharynx is clear.   Eyes: Pupils are equal, round, and reactive to light. Conjunctivae are normal.   Cardiovascular: Normal rate, regular rhythm, normal heart sounds and normal pulses. Exam reveals no gallop and no friction rub.   No murmur " heard.  Pulmonary/Chest: Effort normal and breath sounds normal.   Abdominal: Soft. Normal appearance.   Musculoskeletal:         General: Normal range of motion.      Cervical back: Normal range of motion and neck supple.      Right upper leg: No tenderness.      Left upper leg: Tenderness present.      Right knee: No swelling, deformity or effusion. No tenderness.      Left knee: No swelling, deformity or effusion. No tenderness.      Right lower leg: Normal. No swelling, deformity or tenderness. No edema.      Left lower leg: Normal. No swelling, deformity or tenderness. No edema.      Right ankle: Normal. No deformity. No tenderness.      Left ankle: Normal. No deformity. No tenderness.      Comments: Negative Neely's bialt LE. Mild decreased strength bilat LE. Mild decreased muscle mass bilat LE. Increased tightness of Left Quad on extension and flexion, pt has hard time relaxing muscle.    Neurological: She is alert and oriented to person, place, and time.   Skin: Skin is warm and dry. Capillary refill takes less than 2 seconds.   Psychiatric: Her behavior is normal. Mood normal.        Assessment:   77 y.o. with        1. Hypertension, unspecified type    2. Hyperlipidemia, unspecified hyperlipidemia type    3. Myalgia    4. Physical deconditioning    5. Anxiety    6. Depression, unspecified depression type    7. Gastroesophageal reflux disease, unspecified whether esophagitis present    8. Osteopenia of neck of femur, unspecified laterality    9. Central retinal vein occlusion with macular edema of right eye    10. Mixed stress and urge urinary incontinence    11. Female bladder prolapse         Plan:     Orders Placed This Encounter    Comprehensive Metabolic Panel    Microalbumin/Creatinine Ratio, Urine    Ambulatory referral/consult to Home Health    denosumab (PROLIA) injection 60 mg    famotidine (PEPCID) 20 MG tablet    losartan (COZAAR) 50 MG tablet    atorvastatin (LIPITOR) 20 MG tablet    -Continue Tylenol for LE pain/discomfort. Also discussed Ice/Heat tx, HEP and activity modifications. Discussed holding statin, but pt would like to continue at this time. May need muscle relaxant in future, but will try PT for strengthening and conditioning first.     -Made referral to  for PT/OT of LE pain. Pt with blindness of R eye and will have extreme difficulty leaving home to travel for services.     -Will initiate Prolia of osteopenia w/ elevated Frax  Score and contraindication to bisphosphonate. First injection today. Will repeat in 6 months barring no adverse effects. Continue Vitamin D and Calcium supplementation. Avoid PPIs. Continue exercise, lifestyle and diet modifications. Discussed fall/safety precautions.     -Continue Losartan, ASA and Statin at this time. Refills given. CMP and Urine Microalbumin/Cr ordered for monitoring. Counseled on exercise, lifestyle, and diet modifications.     -Pt with palpations in past, Now resolved. Had ordered Holter but never done 2/2 unkwon issue. Will defer now.     -Continue Famotidine for GERD w/ diet/lifestyle modifications. Refill given.     -Continue to manage urinary incontinence w/ lifestyle modifications. Counseled to RTC if acute worsening or UTI symptoms.     -Continue to manage anxiety/depression w/ self lead coping techniques. Pt doesn't want behavioral health tx or medications at this time. Strict ED precautions for SI/HI/Self-Harm.     -Continue PRN Melatonin and qHS Tylenol for insomnia. Pt doing well. Will CTM.     -Continue f/u with Opthamology as scheduled for chronic progressive eye disease. Given safety and driving precautions.     The patient's diagnosis and medications were discussed.    Karly Aragon   [facility] PGY-2  06/13/2022

## 2022-07-01 ENCOUNTER — OFFICE VISIT (OUTPATIENT)
Dept: OPHTHALMOLOGY | Facility: CLINIC | Age: 77
End: 2022-07-01
Payer: MEDICARE

## 2022-07-01 VITALS — WEIGHT: 200 LBS | HEIGHT: 62 IN | BODY MASS INDEX: 36.8 KG/M2

## 2022-07-01 DIAGNOSIS — H34.8110 CENTRAL RETINAL VEIN OCCLUSION WITH MACULAR EDEMA OF RIGHT EYE: Primary | ICD-10-CM

## 2022-07-01 DIAGNOSIS — H25.12 AGE-RELATED NUCLEAR CATARACT OF LEFT EYE: ICD-10-CM

## 2022-07-01 DIAGNOSIS — Z98.41 CATARACT EXTRACTION STATUS OF EYE, RIGHT: ICD-10-CM

## 2022-07-01 DIAGNOSIS — H04.123 DRY EYE SYNDROME OF BOTH EYES: ICD-10-CM

## 2022-07-01 DIAGNOSIS — H35.373 EPIRETINAL MEMBRANE (ERM) OF BOTH EYES: ICD-10-CM

## 2022-07-01 PROCEDURE — 99213 OFFICE O/P EST LOW 20 MIN: CPT | Mod: PBBFAC,PO

## 2022-07-01 PROCEDURE — 92250 FUNDUS PHOTOGRAPHY W/I&R: CPT | Mod: 59,PBBFAC,PO

## 2022-07-01 PROCEDURE — 92134 CPTRZ OPH DX IMG PST SGM RTA: CPT | Mod: PBBFAC,PO

## 2022-07-01 PROCEDURE — 92133 CPTRZD OPH DX IMG PST SGM ON: CPT | Mod: PBBFAC,PO

## 2022-07-01 NOTE — PATIENT INSTRUCTIONS
- Please continue blood pressure management with primary care provider  - Please return to procedure clinic in 1-2 wks for additional laser for your right eye

## 2022-07-01 NOTE — PROGRESS NOTES
OCT rnfl 4/22  96- SNI green T white, vCDR .06  86- all green, vCDR= .06  small cups, no glaucomatous thinning    OCT RNFL 7/1/22  OD: 9/10, 105um, all white/green  OS: 8/10, 86um, all green    -----------------    OCT Mac 7/1/22  OD: 539um, large cystic IRF  OS: 322um, stable ERM, no IRF/SRF    OCT mac 4/22  OD: 491 large cystic IRF, ERM  OS: 320, temporal ERM, no IRF/SRF    OCT mac 1/31/22  OD: 529, large cystic IRF, ERM  OS: 337, temporal ERM, no IRF/SRF    OCT mac 1/31/22  OD: 615, large cystic IRF, ERM  OS: 317, temporal ERM, no IRF/SRF    OCT Mac 7/30/21  OD: 584 Enlarged IRF, stable ERM  OS: 319 Stable ERM    OCT mac 11/10/20  OD: 515. Stable IRF. Minimal, if any, improvement.  OS: 317. stable ERM    OCT mac 10/16/20  OD: 585 stable IRF, perhaps slight worsening  OS: 317 ERM    OCT Macula 9/17/2020  OD: 555, diffused foveal IRF  OS: 313, flat, mild blunting of foveal contour, ERM    OCT Macula 7/14/20  OD: 641, blunted foveal contour with ERM, central thickening, significantly worse central edema  OS: 315, slightly blunted foveal contour with trace ERM, no thickening  Impression: CME OD significantly worse than last visit    OCT macula 6/3/2020:  OD: 561, blunted foveal contour with ERM, central thickening, significantly worse central edema  OS: 317, slightly blunted foveal contour with trace ERM, no thickening  Impression: CME OD significantly worse than last visit      Assessment/Plan    1) CRVO OD with CME  - Unclear timing although documented 10/2019  - IVFA unable to be done due to poor venous access  - Carotid showed <50% stenosis OU.  INJECTION LOG  - Avastin (10/2019)- no response, ARYA 2 (7/14/2020) - poor response  - Ozurdex (12/4/2019) - worked great previously  - Eylea 3/3 (8/19/2020, 9/17/2020, 10/16/20  - Plan most of 2020 was to proceed with ozurdex injection if no improvement in mac edema. After discussion with Dr. Greer, does not think ozurdex would provide significant benefit over triessence.  Also, there is concern that vein occlusion has worsened given lack of improvement and significant worsening of edema and vision over past 5 months at end of 2020.  - Patient elected to stop injections in 2020 given she has not had significant vision changes with injections  - Exam 1/31/22 no NVI or NVA on SLE and gonio. New NVD & frond of NVE inferotemporally.  - OCT mac 1/31/22 shows large amount of CME, ARYA OD given 1/31/22, treating for NVD and NVE to prevent NVG  - PRP OD 2/16/22, with fill in 03/16/22  - 4/22: without NVI and NVA, NV looks to be fully regressed or close to it, PRP only 4 weeks out, will bring back in 1 month for eval for DFE/OCT mac- pt may need more PRP vs ARYA vs observation  - 7/1/22: OD NVD with diffuse macular DBH. Will bring back in 1-2wks for additional PRP    2) Age-related nuclear cataract, left eye  - High threshold for surgery given functionally monocular, cleared by Ret[facility] for surgery  - Last MRX on 1/31/22  - Monitor    3) ERM, OU  - H/o ERM OD s/p 25g PPV, ERM/ILM peel with FAx 5/23/17. Stable  - H/o ERM OS, NVS, OCT mac 7/1/22 stable  - Monitor    4) Pseudophakia of right eye  - Done 4/24/12 by Dr. Little, SN60WF 18.5D  - Monitor    5) Posterior vitreous detachment, left eye  - No holes/tears/breaks on DFE  - RD precautions    6) Dry eye syndrome of both lacrimal glands  - AT 4-6x/day as needed, lid hygiene  - No complaints today    RTC 1-2 weeks for procedure PRP right eye

## 2022-07-11 ENCOUNTER — TELEPHONE (OUTPATIENT)
Dept: FAMILY MEDICINE | Facility: CLINIC | Age: 77
End: 2022-07-11
Payer: MEDICARE

## 2022-07-11 RX ORDER — SERTRALINE HYDROCHLORIDE 25 MG/1
25 TABLET, FILM COATED ORAL DAILY
Qty: 30 TABLET | Refills: 0 | Status: SHIPPED | OUTPATIENT
Start: 2022-07-11 | End: 2022-08-17 | Stop reason: SDUPTHER

## 2022-07-11 NOTE — TELEPHONE ENCOUNTER
Previously have discussed anxiety with pt at previous visit. Pt didn't want anti anxiety medication at that time. Pt reports that she now feels like she needs anti anxiety medication, has increased stress 2/2 family issues and personal medical issues. Will rx Zoloft 25 mg qd. Will up-titrate pending course. Pt has optho procedures upcoming may need procedural anti anxiety medication pending course. Told pt to call prior to next appt and will discuss need pending responseto Zoloft.

## 2022-07-20 ENCOUNTER — CLINICAL SUPPORT (OUTPATIENT)
Dept: OPHTHALMOLOGY | Facility: CLINIC | Age: 77
End: 2022-07-20
Payer: MEDICARE

## 2022-07-20 VITALS — WEIGHT: 199.94 LBS | HEIGHT: 62 IN | BODY MASS INDEX: 36.79 KG/M2

## 2022-07-20 DIAGNOSIS — H35.373 EPIRETINAL MEMBRANE (ERM) OF BOTH EYES: ICD-10-CM

## 2022-07-20 DIAGNOSIS — H34.8110 CENTRAL RETINAL VEIN OCCLUSION WITH MACULAR EDEMA OF RIGHT EYE: Primary | ICD-10-CM

## 2022-07-20 PROCEDURE — 67228 TREATMENT X10SV RETINOPATHY: CPT | Mod: PBBFAC,PO | Performed by: STUDENT IN AN ORGANIZED HEALTH CARE EDUCATION/TRAINING PROGRAM

## 2022-07-20 PROCEDURE — 99213 OFFICE O/P EST LOW 20 MIN: CPT | Mod: PBBFAC,PO | Performed by: STUDENT IN AN ORGANIZED HEALTH CARE EDUCATION/TRAINING PROGRAM

## 2022-07-20 RX ORDER — METRONIDAZOLE 7.5 MG/G
GEL VAGINAL 2 TIMES DAILY
OUTPATIENT
Start: 2022-07-20

## 2022-07-20 RX ORDER — METRONIDAZOLE 7.5 MG/G
GEL TOPICAL 2 TIMES DAILY
Qty: 45 G | Refills: 1 | Status: SHIPPED | OUTPATIENT
Start: 2022-07-20 | End: 2023-01-18 | Stop reason: SDUPTHER

## 2022-07-20 NOTE — PROGRESS NOTES
HPI     PRP OD    Last edited by Rehana Minaya MA on 7/20/2022 10:16 AM. (History)        OCT RNFL 7/1/22  OD: 9/10, 105um, all white/green  OS: 8/10, 86um, all green    OCT Mac 7/1/22  OD: 539um, large cystic IRF  OS: 322um, stable ERM, no IRF/SRF      Assessment /Plan     For exam results, see Encounter Report.    There are no diagnoses linked to this encounter.    1) CRVO OD with CME  - Unclear timing although documented 10/2019  - IVFA unable to be done due to poor venous access  - Carotid showed <50% stenosis OU.  INJECTION LOG  - Avastin (10/2019)- no response, ARYA 2 (7/14/2020) - poor response  - Ozurdex (12/4/2019) - worked great previously  - Eylea 3/3 (8/19/2020, 9/17/2020, 10/16/20  - Plan most of 2020 was to proceed with ozurdex injection if no improvement in mac edema. After discussion with Dr. Greer, does not think ozurdex would provide significant benefit over triessence. Also, there is concern that vein occlusion has worsened given lack of improvement and significant worsening of edema and vision over past 5 months at end of 2020.  - Patient elected to stop injections in 2020 given she has not had significant vision changes with injections  - Exam 1/31/22 no NVI or NVA on SLE and gonio. New NVD & frond of NVE inferotemporally.  - OCT mac 1/31/22 shows large amount of CME, ARYA OD given 1/31/22, treating for NVD and NVE to prevent NVG  - PRP OD 2/16/22, with fill in 03/16/22  - 4/22: without NVI and NVA, NV looks to be fully regressed or close to it, PRP only 4 weeks out, will bring back in 1 month for eval for DFE/OCT mac- pt may need more PRP vs ARYA vs observation  - 7/1/22: OD NVD with diffuse macular DBH.   - plan 7/20/22 for PRP OD. RBA discussed. Patient understands risk and elects to proceed with PRP OD. Consent signed. See procedure note below for details.    2) Age-related nuclear cataract, left eye  - High threshold for surgery given functionally monocular, cleared by Retina for surgery  -  Last MRX on 1/31/22  - Monitor    3) ERM, OU  - H/o ERM OD s/p 25g PPV, ERM/ILM peel with FAx 5/23/17. Stable  - H/o ERM OS, NVS, OCT mac 7/1/22 stable  - Monitor    4) Pseudophakia of right eye  - Done 4/24/12 by Dr. Little, SN60WF 18.5D  - Monitor    5) Posterior vitreous detachment, left eye  - No holes/tears/breaks on DFE  - RD precautions    6) Dry eye syndrome of both lacrimal glands  - AT 4-6x/day as needed, lid hygiene  - No complaints today    RTC 2 mo for DFE and OCT mac    Procedure Note:     07/20/2022   Procedure: Panretinal Photocoagulation, righ eye  Pre- and Post-Procedure Diagnosis: Proliferative Diabetic Retinopathy Right     I have explained the risks, benefits and alternatives of the procedure in detail. The patient voices understanding, all questions have been answered, and informed consent was obtained and placed in the chart. The patient agrees to proceed as planned. A timeout was performed.    Topical Anesthesia: Proparacaine 0.5%  Technique: MERVIN   Spot size: 200 microns  Duration: 100 milliseconds  Power: 130-190 mW  Spots: [id number]  Location: 360 fill in    The patient had difficulty with higher power. Patient with blonde fundus and spots at more tolerable power did not show uptake. Will follow up with DFE in 2 mo. No complications were observed. See progress note for the post-procedure plan.

## 2022-07-21 ENCOUNTER — TELEPHONE (OUTPATIENT)
Dept: OPHTHALMOLOGY | Facility: CLINIC | Age: 77
End: 2022-07-21
Payer: MEDICARE

## 2022-08-06 ENCOUNTER — HOSPITAL ENCOUNTER (EMERGENCY)
Facility: HOSPITAL | Age: 77
Discharge: HOME | End: 2022-08-06
Attending: FAMILY MEDICINE
Payer: MEDICARE

## 2022-08-06 VITALS
WEIGHT: 194 LBS | SYSTOLIC BLOOD PRESSURE: 158 MMHG | RESPIRATION RATE: 20 BRPM | HEART RATE: 72 BPM | HEIGHT: 62 IN | DIASTOLIC BLOOD PRESSURE: 77 MMHG | OXYGEN SATURATION: 98 % | TEMPERATURE: 99 F | BODY MASS INDEX: 35.7 KG/M2

## 2022-08-06 DIAGNOSIS — U07.1 COVID: Primary | ICD-10-CM

## 2022-08-06 DIAGNOSIS — R05.9 COUGH: ICD-10-CM

## 2022-08-06 LAB
ALBUMIN SERPL-MCNC: 3.9 GM/DL (ref 3.4–4.8)
ALBUMIN/GLOB SERPL: 1.2 RATIO (ref 1.1–2)
ALP SERPL-CCNC: 108 UNIT/L (ref 40–150)
ALT SERPL-CCNC: 35 UNIT/L (ref 0–55)
AST SERPL-CCNC: 33 UNIT/L (ref 5–34)
BASOPHILS # BLD AUTO: 0.02 X10(3)/MCL (ref 0–0.2)
BASOPHILS NFR BLD AUTO: 0.5 %
BILIRUBIN DIRECT+TOT PNL SERPL-MCNC: 0.6 MG/DL
BUN SERPL-MCNC: 12.8 MG/DL (ref 9.8–20.1)
CALCIUM SERPL-MCNC: 9.2 MG/DL (ref 8.4–10.2)
CHLORIDE SERPL-SCNC: 107 MMOL/L (ref 98–107)
CO2 SERPL-SCNC: 27 MMOL/L (ref 23–31)
CREAT SERPL-MCNC: 0.78 MG/DL (ref 0.55–1.02)
EOSINOPHIL # BLD AUTO: 0.02 X10(3)/MCL (ref 0–0.9)
EOSINOPHIL NFR BLD AUTO: 0.5 %
ERYTHROCYTE [DISTWIDTH] IN BLOOD BY AUTOMATED COUNT: 13.1 % (ref 11.5–17)
FLUAV AG UPPER RESP QL IA.RAPID: NOT DETECTED
FLUBV AG UPPER RESP QL IA.RAPID: NOT DETECTED
GLOBULIN SER-MCNC: 3.2 GM/DL (ref 2.4–3.5)
GLUCOSE SERPL-MCNC: 114 MG/DL (ref 82–115)
HCT VFR BLD AUTO: 40.1 % (ref 37–47)
HGB BLD-MCNC: 12.9 GM/DL (ref 12–16)
IMM GRANULOCYTES # BLD AUTO: 0.01 X10(3)/MCL (ref 0–0.04)
IMM GRANULOCYTES NFR BLD AUTO: 0.2 %
LYMPHOCYTES # BLD AUTO: 1.16 X10(3)/MCL (ref 0.6–4.6)
LYMPHOCYTES NFR BLD AUTO: 27.8 %
MCH RBC QN AUTO: 30.4 PG (ref 27–31)
MCHC RBC AUTO-ENTMCNC: 32.2 MG/DL (ref 33–36)
MCV RBC AUTO: 94.4 FL (ref 80–94)
MONOCYTES # BLD AUTO: 0.71 X10(3)/MCL (ref 0.1–1.3)
MONOCYTES NFR BLD AUTO: 17 %
NEUTROPHILS # BLD AUTO: 2.3 X10(3)/MCL (ref 2.1–9.2)
NEUTROPHILS NFR BLD AUTO: 54 %
NRBC BLD AUTO-RTO: 0 %
PLATELET # BLD AUTO: 180 X10(3)/MCL (ref 130–400)
PMV BLD AUTO: 10.2 FL (ref 7.4–10.4)
POTASSIUM SERPL-SCNC: 3.8 MMOL/L (ref 3.5–5.1)
PROT SERPL-MCNC: 7.1 GM/DL (ref 5.8–7.6)
RBC # BLD AUTO: 4.25 X10(6)/MCL (ref 4.2–5.4)
SARS-COV-2 RNA RESP QL NAA+PROBE: DETECTED
SODIUM SERPL-SCNC: 142 MMOL/L (ref 136–145)
WBC # SPEC AUTO: 4.2 X10(3)/MCL (ref 4.5–11.5)

## 2022-08-06 PROCEDURE — 87636 SARSCOV2 & INF A&B AMP PRB: CPT | Performed by: PHYSICIAN ASSISTANT

## 2022-08-06 PROCEDURE — 85025 COMPLETE CBC W/AUTO DIFF WBC: CPT | Performed by: PHYSICIAN ASSISTANT

## 2022-08-06 PROCEDURE — 80053 COMPREHEN METABOLIC PANEL: CPT | Performed by: PHYSICIAN ASSISTANT

## 2022-08-06 PROCEDURE — 36415 COLL VENOUS BLD VENIPUNCTURE: CPT | Performed by: PHYSICIAN ASSISTANT

## 2022-08-06 PROCEDURE — 99284 EMERGENCY DEPT VISIT MOD MDM: CPT | Mod: 25

## 2022-08-06 RX ORDER — PROMETHAZINE HYDROCHLORIDE AND DEXTROMETHORPHAN HYDROBROMIDE 6.25; 15 MG/5ML; MG/5ML
5 SYRUP ORAL EVERY 6 HOURS PRN
Qty: 100 ML | Refills: 0 | Status: SHIPPED | OUTPATIENT
Start: 2022-08-06 | End: 2022-08-11

## 2022-08-06 NOTE — DISCHARGE INSTRUCTIONS
Please quarantine for 5 days from the start of symptoms. Get plenty of rest and drink plenty of fluids.

## 2022-08-06 NOTE — ED PROVIDER NOTES
"Encounter Date: 8/6/2022       History     Chief Complaint   Patient presents with    Cough    Fever    Sore Throat    Headache    COVID-19 Concerns     Pt states since Thursday she started with above symptoms. Son did home covid test which she said was positive.      Nu Denny is a 77 y.o. female who presents to the ED with complaints of cough, nasal congestion, and fatigue that started 3 day(s) ago.  She states her son tested her for covid this morning and she was positive. She reports "I feel fine, my son made me come."      The history is provided by the patient. No  was used.     Review of patient's allergies indicates:   Allergen Reactions    Codeine Rash     Past Medical History:   Diagnosis Date    Anxiety 06/13/2022    Cataract of left eye 06/13/2022    Central retinal vein occlusion of right eye 06/13/2022    Female bladder prolapse 06/13/2022    Gastroesophageal reflux disease 06/13/2022    Hyperlipidemia 06/13/2022    Hypertension 06/13/2022    Mild CAD     Osteopenia of neck of femur 06/13/2022    Urinary incontinence 06/13/2022    Zenker diverticulum      Past Surgical History:   Procedure Laterality Date    CHOLECYSTECTOMY      COLONOSCOPY  2017    HYSTERECTOMY      KNEE SURGERY Right     x2; states MD wanted to do replacement but pt declined    SHOULDER SURGERY  1995    TONSILLECTOMY      VITRECTOMY       Family History   Problem Relation Age of Onset    Hypertension Mother     No Known Problems Father     No Known Problems Sister     No Known Problems Brother     No Known Problems Maternal Aunt     No Known Problems Maternal Uncle     No Known Problems Paternal Aunt     No Known Problems Paternal Uncle     Diabetes Maternal Grandmother     No Known Problems Maternal Grandfather     No Known Problems Paternal Grandmother     No Known Problems Paternal Grandfather     Amblyopia Neg Hx     Blindness Neg Hx     Cancer Neg Hx     " Cataracts Neg Hx     Glaucoma Neg Hx     Macular degeneration Neg Hx     Retinal detachment Neg Hx     Strabismus Neg Hx     Stroke Neg Hx     Thyroid disease Neg Hx      Social History     Tobacco Use    Smoking status: Never Smoker    Smokeless tobacco: Never Used   Substance Use Topics    Alcohol use: Never    Drug use: Never     Review of Systems   Constitutional: Positive for fatigue. Negative for chills and fever.   HENT: Positive for congestion. Negative for ear pain, rhinorrhea, sinus pressure, sinus pain and sore throat.    Eyes: Negative for pain.   Respiratory: Positive for cough. Negative for chest tightness and shortness of breath.    Cardiovascular: Negative for chest pain.   Gastrointestinal: Negative for abdominal pain, constipation, diarrhea, nausea and vomiting.   Genitourinary: Negative for dysuria.   Musculoskeletal: Negative for back pain and joint swelling.   Skin: Negative for color change and rash.   Neurological: Negative for dizziness and weakness.   Psychiatric/Behavioral: Negative for behavioral problems and confusion.       Physical Exam     Initial Vitals [08/06/22 1353]   BP Pulse Resp Temp SpO2   (!) 160/79 75 20 98.6 °F (37 °C) 98 %      MAP       --         Physical Exam    Constitutional: She appears well-developed and well-nourished.   HENT:   Head: Normocephalic and atraumatic.   Nose: Nose normal.   Clear post nasal drainage noted   Eyes: EOM are normal. Pupils are equal, round, and reactive to light.   Neck: Neck supple. No thyromegaly present. No JVD present.   Normal range of motion.  Cardiovascular: Normal rate, regular rhythm, normal heart sounds and intact distal pulses.   No murmur heard.  Pulmonary/Chest: Breath sounds normal. No respiratory distress. She has no wheezes. She has no rhonchi. She has no rales. She exhibits no tenderness.   Abdominal: Abdomen is soft. Bowel sounds are normal. She exhibits no distension. There is no abdominal tenderness. There is  no rebound and no guarding.   Musculoskeletal:         General: No tenderness or edema. Normal range of motion.      Cervical back: Normal range of motion and neck supple.     Lymphadenopathy:     She has no cervical adenopathy.   Neurological: She is alert and oriented to person, place, and time.   Skin: Skin is warm and dry. Capillary refill takes less than 2 seconds.   Psychiatric: She has a normal mood and affect. Thought content normal.         ED Course   Procedures  Labs Reviewed   COVID/FLU A&B PCR - Abnormal; Notable for the following components:       Result Value    SARS-CoV-2 PCR Detected (*)     All other components within normal limits   CBC WITH DIFFERENTIAL - Abnormal; Notable for the following components:    WBC 4.2 (*)     MCV 94.4 (*)     MCHC 32.2 (*)     All other components within normal limits   CBC W/ AUTO DIFFERENTIAL    Narrative:     The following orders were created for panel order CBC auto differential.  Procedure                               Abnormality         Status                     ---------                               -----------         ------                     CBC with Differential[[id number]]        Abnormal            Final result                 Please view results for these tests on the individual orders.   COMPREHENSIVE METABOLIC PANEL          Imaging Results          X-Ray Chest PA And Lateral (Final result)  Result time 08/06/22 14:30:36    Final result by Shiva Matson MD (08/06/22 14:30:36)                 Impression:      No acute cardiopulmonary process identified.      Electronically signed by: Shiva Matson  Date:    08/06/2022  Time:    14:30             Narrative:    EXAMINATION:  XR CHEST PA AND LATERAL    CLINICAL HISTORY:  Cough, unspecified    TECHNIQUE:  Two-view    COMPARISON:  May 16, 2018.    FINDINGS:  Cardiopericardial silhouette is within normal limits.  No acute dense focal or segmental consolidation, congestion, pleural effusion or  pneumothorax.                                 Medications - No data to display              ED Course as of 08/06/22 1530   Sat Aug 06, 2022   1505 Reassessed patinet at this time. Discussed lab results and CXR. Awaiting covid results.  [VJ]   1529 Covid +, VSS. Will treat with symptomatic medication. Strict return precautions given. Stable for discharge.  [VJ]      ED Course User Index  [VJ] Caren Banda PA-C             Clinical Impression:   Final diagnoses:  [R05.9] Cough  [U07.1] COVID (Primary)          ED Disposition Condition    Discharge Stable        ED Prescriptions     Medication Sig Dispense Start Date End Date Auth. Provider    promethazine-dextromethorphan (PROMETHAZINE-DM) 6.25-15 mg/5 mL Syrp Take 5 mLs by mouth every 6 (six) hours as needed (cough). 100 mL 8/6/2022 8/11/2022 Caren Banda PA-C        Follow-up Information     Follow up With Specialties Details Why Contact Info    Karly Aragon MD Family Medicine   [address] [facility] [address]  [phone]      [facility] Emergency Medicine In 3 days As needed, If symptoms worsen [address] [facility] [address] [phone]           Caren Banda PA-C  08/06/22 1531

## 2022-08-09 RX ORDER — FAMOTIDINE 20 MG/1
20 TABLET, FILM COATED ORAL 2 TIMES DAILY
Qty: 180 TABLET | Refills: 0 | Status: SHIPPED | OUTPATIENT
Start: 2022-08-09 | End: 2022-11-28 | Stop reason: SDUPTHER

## 2022-08-17 RX ORDER — SERTRALINE HYDROCHLORIDE 25 MG/1
25 TABLET, FILM COATED ORAL DAILY
Qty: 30 TABLET | Refills: 0 | Status: SHIPPED | OUTPATIENT
Start: 2022-08-17 | End: 2022-09-27 | Stop reason: SDUPTHER

## 2022-09-27 ENCOUNTER — HOSPITAL ENCOUNTER (OUTPATIENT)
Dept: RADIOLOGY | Facility: HOSPITAL | Age: 77
Discharge: HOME | End: 2022-09-27
Attending: STUDENT IN AN ORGANIZED HEALTH CARE EDUCATION/TRAINING PROGRAM
Payer: MEDICARE

## 2022-09-27 ENCOUNTER — OFFICE VISIT (OUTPATIENT)
Dept: FAMILY MEDICINE | Facility: CLINIC | Age: 77
End: 2022-09-27
Payer: MEDICARE

## 2022-09-27 VITALS
DIASTOLIC BLOOD PRESSURE: 84 MMHG | RESPIRATION RATE: 18 BRPM | TEMPERATURE: 99 F | WEIGHT: 196 LBS | SYSTOLIC BLOOD PRESSURE: 146 MMHG | HEART RATE: 80 BPM | OXYGEN SATURATION: 97 % | BODY MASS INDEX: 35.85 KG/M2

## 2022-09-27 DIAGNOSIS — M54.2 NECK PAIN: ICD-10-CM

## 2022-09-27 DIAGNOSIS — M47.892 OTHER OSTEOARTHRITIS OF SPINE, CERVICAL REGION: Primary | ICD-10-CM

## 2022-09-27 DIAGNOSIS — Z23 IMMUNIZATION DUE: ICD-10-CM

## 2022-09-27 DIAGNOSIS — F41.9 ANXIETY: ICD-10-CM

## 2022-09-27 PROBLEM — B02.21 HERPES ZOSTER OTICUS: Status: ACTIVE | Noted: 2022-09-27

## 2022-09-27 PROCEDURE — 72040 X-RAY EXAM NECK SPINE 2-3 VW: CPT | Mod: TC

## 2022-09-27 PROCEDURE — G0008 ADMIN INFLUENZA VIRUS VAC: HCPCS | Mod: PBBFAC

## 2022-09-27 PROCEDURE — 99215 OFFICE O/P EST HI 40 MIN: CPT | Mod: PBBFAC,25

## 2022-09-27 RX ORDER — METHOCARBAMOL 500 MG/1
500 TABLET, FILM COATED ORAL 3 TIMES DAILY
Qty: 21 TABLET | Refills: 0 | Status: SHIPPED | OUTPATIENT
Start: 2022-09-27 | End: 2022-09-27 | Stop reason: ALTCHOICE

## 2022-09-27 RX ORDER — SERTRALINE HYDROCHLORIDE 25 MG/1
50 TABLET, FILM COATED ORAL DAILY
Qty: 60 TABLET | Refills: 5 | Status: SHIPPED | OUTPATIENT
Start: 2022-09-27 | End: 2023-01-18

## 2022-09-27 RX ORDER — SERTRALINE HYDROCHLORIDE 25 MG/1
25 TABLET, FILM COATED ORAL DAILY
Qty: 30 TABLET | Refills: 5 | Status: SHIPPED | OUTPATIENT
Start: 2022-09-27 | End: 2022-09-27

## 2022-09-27 RX ORDER — CYCLOBENZAPRINE HCL 5 MG
5 TABLET ORAL 3 TIMES DAILY PRN
Qty: 30 TABLET | Refills: 0 | Status: SHIPPED | OUTPATIENT
Start: 2022-09-27 | End: 2022-10-07

## 2022-10-07 NOTE — PROGRESS NOTES
Discussed with resident at time of encounter; pt. seen.  Resident's note reviewed 10-07-22; delayed receipt from resident.  Agree with assessment; plan of care appropriate.  Follow-up diagnostic studies; caution if muscle relaxant used.  Professional services provided in an outpatient primary care center affiliated with a teaching institution.

## 2022-10-13 ENCOUNTER — OFFICE VISIT (OUTPATIENT)
Dept: FAMILY MEDICINE | Facility: CLINIC | Age: 77
End: 2022-10-13
Payer: MEDICARE

## 2022-10-13 VITALS
OXYGEN SATURATION: 97 % | SYSTOLIC BLOOD PRESSURE: 137 MMHG | TEMPERATURE: 98 F | HEIGHT: 62 IN | WEIGHT: 195.38 LBS | RESPIRATION RATE: 18 BRPM | DIASTOLIC BLOOD PRESSURE: 74 MMHG | HEART RATE: 75 BPM | BODY MASS INDEX: 35.95 KG/M2

## 2022-10-13 DIAGNOSIS — D70.9 NEUTROPENIA, UNSPECIFIED TYPE: ICD-10-CM

## 2022-10-13 DIAGNOSIS — M54.2 CERVICAL PAIN (NECK): Primary | ICD-10-CM

## 2022-10-13 DIAGNOSIS — R00.2 PALPITATIONS: ICD-10-CM

## 2022-10-13 DIAGNOSIS — Z12.11 COLON CANCER SCREENING: ICD-10-CM

## 2022-10-13 DIAGNOSIS — I10 PRIMARY HYPERTENSION: ICD-10-CM

## 2022-10-13 DIAGNOSIS — Z12.31 BREAST CANCER SCREENING BY MAMMOGRAM: ICD-10-CM

## 2022-10-13 PROCEDURE — 96372 THER/PROPH/DIAG INJ SC/IM: CPT | Mod: PBBFAC

## 2022-10-13 PROCEDURE — 99215 OFFICE O/P EST HI 40 MIN: CPT | Mod: PBBFAC

## 2022-10-13 RX ORDER — BETAMETHASONE SODIUM PHOSPHATE AND BETAMETHASONE ACETATE 3; 3 MG/ML; MG/ML
6 INJECTION, SUSPENSION INTRA-ARTICULAR; INTRALESIONAL; INTRAMUSCULAR; SOFT TISSUE
Status: COMPLETED | OUTPATIENT
Start: 2022-10-13 | End: 2022-10-13

## 2022-10-13 RX ORDER — LOSARTAN POTASSIUM 50 MG/1
50 TABLET ORAL DAILY
Qty: 90 TABLET | Refills: 0 | Status: SHIPPED | OUTPATIENT
Start: 2022-10-13 | End: 2023-01-18 | Stop reason: SDUPTHER

## 2022-10-13 RX ADMIN — BETAMETHASONE ACETATE AND BETAMETHASONE SODIUM PHOSPHATE 6 MG: 3; 3 INJECTION, SUSPENSION INTRA-ARTICULAR; INTRALESIONAL; INTRAMUSCULAR; SOFT TISSUE at 02:10

## 2022-10-13 NOTE — PROGRESS NOTES
[facility] Visit Note  [facility]      Subjective:      Nu Denny is a 77 y.o. female who is presenting to clinic for routine follow up.     Neck Pain: 1.5-2 month hx of posterior 4-8/10 posterior neck and occipital skull pain, worse w/ moving head laterally. Feels tense in her shoulder bilat. Denies any distal numbness/tingling or weakness of upper extremity. Can only sleep w/ one direction and one pillow 2/2 discomfort. Taking Tylenol Arthritis 2 pills 3-4 x/day w/ only mild releif. Also tried Robaxin 500 mg BID x 7 days. Not using Ice/Heat tx. Not doing Hep Tx, as doesn't want to move neck much 2/2 to avoid discomfort/aggravating it. She denies any trauma or falls. Denies any CP, SOB, palpitations, light-headedness/dizziness, jaw pain,  facial pain, numbness/tingling, rashes. Does have bilat  intermittent ear pressure  (L > R), which began approx 3 weeks ago. Taking Zyrtec nightly.  Denies any ear drainage. Denies any HA, runny nose, congestion, visual changes (does have chronic visual issues though).     Palpitations:   Previously had resolved. Had a couple palpitations intermittently this week. Occur in isolation w/o associated symptoms.  Holter previously ordered, but then defered 2/2 resolution. Pt amenable again.     Anxiety: Taking Zoloft 50 mg. Believes that she functions better w/ the medicine. Reports less anxiety overall. Denies any depressed mood. Stil lhas mild anxiety regarding eye treatments. More active and doing more activiteis since being on Zoloft. Feels more upbeat. Insomnia is resolved since initating Zoloft.      HTN: Taking Losartan 50 mg qd. Checking BP ocassionally, runs < 1130s/80s typically. Needs refill of Losartan.     HLD: Taking Atorvastin 20 mg. Denies any myalgias. Taking Apple Cider Vinegar.     Osteopenia: Taking Prolia, first injection in 6/2022. Taking Vitamin D and Calcium as prev rx. Couldn't take bisphosphonate 2/2 hx of zenker diverticulum. Walks frequently,  but no other formal exercise. No falls or gait instability. Has good home awareness/fall precautions in place.     GERD   Taking Famotidine 20mg. Using Diet/Lifestyle modifications. Good symptomatic control.       Incontinece and Bladder  Prolapse.   Has urge and stress symptoms. Wears diapers and pads during prolonged outings. Able to control w/ lifestyle and activity modifications. Denies recurrent or recent urinary tract infections or symptoms. Doesn't want any further medical or other intervention.      Chronic Eye Issues:   Continues to follow with Opthalmology. Undergoing multiple interventions to prevent vascular proliferation and further deterioration. R eye sight nearly gone, L side more intact. Goes to Optho in 11/15/2022.     Health Care Maintenance:     Breast: MMG ordered today. Pt would like to reinitiate screening this year.   Cervix: Still has cervix. Denies hx of abnormal pap semar.   Lung: Never smoked.   Bone : Osteopenia. Taking Prolia. Taking Vitamin D and Caclium.   Colon: Repeat Fit due. Negative in 11/2021.   Immunizations: UTD.     Review of Systems: As noted in HPI.     Past Medical History:   Diagnosis Date    Anxiety 06/13/2022    Cataract of left eye 06/13/2022    Central retinal vein occlusion of right eye 06/13/2022    Female bladder prolapse 06/13/2022    Gastroesophageal reflux disease 06/13/2022    Hyperlipidemia 06/13/2022    Hypertension 06/13/2022    Mild CAD     Osteopenia of neck of femur 06/13/2022    Urinary incontinence 06/13/2022    Zenker diverticulum        Past Surgical History:   Procedure Laterality Date    CHOLECYSTECTOMY      COLONOSCOPY  2017    HYSTERECTOMY      KNEE SURGERY Right     x2; states MD wanted to do replacement but pt declined    SHOULDER SURGERY  1995    TONSILLECTOMY      VITRECTOMY         Family History   Problem Relation Age of Onset    Hypertension Mother     No Known Problems Father     No Known Problems Sister     No Known Problems Brother      No Known Problems Maternal Aunt     No Known Problems Maternal Uncle     No Known Problems Paternal Aunt     No Known Problems Paternal Uncle     Diabetes Maternal Grandmother     No Known Problems Maternal Grandfather     No Known Problems Paternal Grandmother     No Known Problems Paternal Grandfather     Amblyopia Neg Hx     Blindness Neg Hx     Cancer Neg Hx     Cataracts Neg Hx     Glaucoma Neg Hx     Macular degeneration Neg Hx     Retinal detachment Neg Hx     Strabismus Neg Hx     Stroke Neg Hx     Thyroid disease Neg Hx        Social History     Socioeconomic History    Marital status:    Tobacco Use    Smoking status: Never    Smokeless tobacco: Never   Substance and Sexual Activity    Alcohol use: Never    Drug use: Never       Current Outpatient Medications   Medication Sig Dispense Refill    aspirin (ECOTRIN) 81 MG EC tablet Take 81 mg by mouth once daily.      atorvastatin (LIPITOR) 20 MG tablet Take 1 tablet (20 mg total) by mouth once daily. 90 tablet 0    b complex vitamins capsule Take 1 capsule by mouth once daily.      calcium carbonate (OS-SREEDHAR) 500 mg calcium (1,250 mg) chewable tablet Take 1 tablet by mouth once daily.      docusate sodium (COLACE) 100 MG capsule Take 100 mg by mouth once daily at 6am.      famotidine (PEPCID) 20 MG tablet Take 1 tablet (20 mg total) by mouth 2 (two) times daily. 180 tablet 0    losartan (COZAAR) 50 MG tablet Take 1 tablet (50 mg total) by mouth once daily. 90 tablet 0    methylcellulose oral powder Take 3.4 g by mouth once daily.      metroNIDAZOLE (METROGEL) 0.75 % gel Apply topically 2 (two) times daily. Apply to face PRN for rosacea related rash 45 g 1    multivitamin capsule Take 1 capsule by mouth once daily.      sertraline (ZOLOFT) 25 MG tablet Take 2 tablets (50 mg total) by mouth once daily. 60 tablet 5    vitamin D (VITAMIN D3) 1000 units Tab Take 1,000 Units by mouth once daily.       No current facility-administered medications for this  "visit.       Review of patient's allergies indicates:   Allergen Reactions    Codeine Rash       Objective:   /74 (BP Location: Left arm, Patient Position: Sitting, BP Method: Large (Automatic))   Pulse 75   Temp 98.1 °F (36.7 °C) (Oral)   Resp 18   Ht 5' 2" (1.575 m)   Wt 88.6 kg (195 lb 6.4 oz)   SpO2 97%   BMI 35.74 kg/m²      Physical Exam   Constitutional: She is oriented to person, place, and time. normal appearance.  Non-toxic appearance. No distress.   HENT:   Nose: Nose normal.   Mouth/Throat: Mucous membranes are moist. Oropharynx is clear.   Eyes: Pupils are equal, round, and reactive to light.   Neck:   Notes tenderness to palpation overlying cervical paraspinal muscles and bilat shoulder region    Cardiovascular: Normal rate, regular rhythm and normal pulses. Exam reveals no gallop and no friction rub.   No murmur heard.Pulmonary:      Effort: Pulmonary effort is normal.      Breath sounds: Normal breath sounds.     Abdominal: Soft. Normal appearance.   Musculoskeletal:      Cervical back: Normal range of motion. Rigidity present.   Neurological: She is alert and oriented to person, place, and time. She displays no weakness. No cranial nerve deficit.   Skin: Skin is warm and dry. Capillary refill takes less than 2 seconds.   Psychiatric: Mood normal.      Assessment:   77 y.o. with        1. Cervical pain (neck)    2. Palpitations    3. Primary hypertension    4. Neutropenia, unspecified type    5. Colon cancer screening    6. Breast cancer screening by mammogram         Plan:     Orders Placed This Encounter    Mammo Digital Screening Bilat w/ Kenneth    OCCULT BLOOD FECAL IMMUNOASSAY    CBC Auto Differential    Holter monitor - 48 hour    betamethasone acetate-betamethasone sodium phosphate injection 6 mg    losartan (COZAAR) 50 MG tablet   -Cervical Spine X-Ray ordered last appt w/ degenerative changes, narrowing posteriorly at C3-C4 and degenerative changes at C6-C7.    -Will attempt " Celestone 6 mg today x 1 dose. Continue Tylenol up to 1000 mg TID. Ice/Heat tx. Given Cervical Spine ROM exercises.     -If no improvement symptomatically, consider formal PT and escalating imaging to further investigate degenerative changes. RTC/ED precautions given, but no red flags signs at this time.     -Palpations likely related to anxiety. However, will order Holter monitor since recurrent.     -Continue Zoloft for anxiety. Dose recently increased to 50 mg qd and pt doing relatively well.     -Refilled Losartan 50 mg per pt request. She denies need for any other refills.     -CBC ordered, mild neutropenia on last CBC. Pl;ease have drawn at next appt, doesn't appear to be collected per chart review.     -MMG and Fit ordered for HCM.     Karly Aragon MD   [facility] PGY-III  10/13/2022

## 2022-10-20 ENCOUNTER — TELEPHONE (OUTPATIENT)
Dept: FAMILY MEDICINE | Facility: CLINIC | Age: 77
End: 2022-10-20
Payer: MEDICARE

## 2022-10-20 NOTE — TELEPHONE ENCOUNTER
Spoke with patient 10/20/2022 and provided number, [phone] discussed results of cervical spine x-ray and overall level of neck pain.  Since she saw me, she came into clinic on 10/13 and received celestone shot, has been performing cervical spine range of motion exercises and using Tylenol 3 times a day. She reports improvement in her range of motion and pain level.  Patient has no questions or concerns at this time.

## 2022-10-27 ENCOUNTER — HOSPITAL ENCOUNTER (OUTPATIENT)
Dept: RADIOLOGY | Facility: HOSPITAL | Age: 77
Discharge: HOME | End: 2022-10-27
Attending: STUDENT IN AN ORGANIZED HEALTH CARE EDUCATION/TRAINING PROGRAM
Payer: MEDICARE

## 2022-10-27 DIAGNOSIS — Z12.31 BREAST CANCER SCREENING BY MAMMOGRAM: ICD-10-CM

## 2022-10-27 PROCEDURE — 77067 SCR MAMMO BI INCL CAD: CPT | Mod: 26,,, | Performed by: RADIOLOGY

## 2022-10-27 PROCEDURE — 77063 MAMMO DIGITAL SCREENING BILAT WITH TOMO: ICD-10-PCS | Mod: 26,,, | Performed by: RADIOLOGY

## 2022-10-27 PROCEDURE — 77067 SCR MAMMO BI INCL CAD: CPT | Mod: TC

## 2022-10-27 PROCEDURE — 77063 BREAST TOMOSYNTHESIS BI: CPT | Mod: 26,,, | Performed by: RADIOLOGY

## 2022-10-27 PROCEDURE — 77067 MAMMO DIGITAL SCREENING BILAT WITH TOMO: ICD-10-PCS | Mod: 26,,, | Performed by: RADIOLOGY

## 2022-10-28 ENCOUNTER — TELEPHONE (OUTPATIENT)
Dept: FAMILY MEDICINE | Facility: CLINIC | Age: 77
End: 2022-10-28
Payer: MEDICARE

## 2022-10-28 DIAGNOSIS — H92.02 LEFT EAR PAIN: Primary | ICD-10-CM

## 2022-10-28 DIAGNOSIS — R19.5 POSITIVE FIT (FECAL IMMUNOCHEMICAL TEST): ICD-10-CM

## 2022-10-28 NOTE — TELEPHONE ENCOUNTER
Called pt, spoke about + fit. Referral placed to GI. Also placed referral to ENT, pt previoiusly established. Wants to be sent again for L ear fullness.

## 2022-11-15 ENCOUNTER — OFFICE VISIT (OUTPATIENT)
Dept: OPHTHALMOLOGY | Facility: CLINIC | Age: 77
End: 2022-11-15
Payer: MEDICARE

## 2022-11-15 VITALS — WEIGHT: 195.31 LBS | HEIGHT: 62 IN | BODY MASS INDEX: 35.94 KG/M2

## 2022-11-15 DIAGNOSIS — H34.8110 CENTRAL RETINAL VEIN OCCLUSION WITH MACULAR EDEMA OF RIGHT EYE: Primary | ICD-10-CM

## 2022-11-15 DIAGNOSIS — H35.373 EPIRETINAL MEMBRANE (ERM) OF BOTH EYES: ICD-10-CM

## 2022-11-15 PROCEDURE — 99213 OFFICE O/P EST LOW 20 MIN: CPT | Mod: PBBFAC,PO | Performed by: STUDENT IN AN ORGANIZED HEALTH CARE EDUCATION/TRAINING PROGRAM

## 2022-11-15 PROCEDURE — 92134 CPTRZ OPH DX IMG PST SGM RTA: CPT | Mod: PBBFAC,PO | Performed by: OPHTHALMOLOGY

## 2022-11-15 RX ORDER — SERTRALINE HYDROCHLORIDE 50 MG/1
50 TABLET, FILM COATED ORAL DAILY
COMMUNITY
Start: 2022-10-29 | End: 2023-01-18 | Stop reason: SDUPTHER

## 2022-11-15 NOTE — PROGRESS NOTES
HPI     CRVO OD     Additional comments: S/P PRP OD 7/20/2022           Comments    2 mos DFE and OCT mac          Last edited by Rehana Minaya MA on 11/15/2022  9:33 AM.            Assessment /Plan     For exam results, see Encounter Report.    Central retinal vein occlusion with macular edema of right eye    Epiretinal membrane (ERM) of both eyes               OCT RNFL 7/1/22  OD: 9/10, 105um, all white/green  OS: 8/10, 86um, all green     OCT Mac 7/1/22  OD: 539um, large cystic IRF  OS: 322um, stable ERM, no IRF/SRF    OCT MAC 11/15/22  OD:  421 generalized edema focused nasally; grossly stable from prior but improved form 4/2022 and prior  OS:  326, stable ERM, no IRF/SRF        ==================    1) CRVO OD with CME  - Unclear timing although documented 10/2019  - IVFA unable to be done due to poor venous access  - Carotid showed <50% stenosis OU.  INJECTION LOG  - Avastin (10/2019)- no response, ARYA 2 (7/14/2020) - poor response  - Ozurdex (12/4/2019) - worked great previously  - Eylea 3/3 (8/19/2020, 9/17/2020, 10/16/20  - Plan most of 2020 was to proceed with ozurdex injection if no improvement in mac edema. After discussion with Dr. Greer, does not think ozurdex would provide significant benefit over triessence. Also, there is concern that vein occlusion has worsened given lack of improvement and significant worsening of edema and vision over past 5 months at end of 2020.  - Patient elected to stop injections in 2020 given she has not had significant vision changes with injections  - Exam 1/31/22 no NVI or NVA on SLE and gonio. New NVD & frond of NVE inferotemporally.  - OCT mac 1/31/22 shows large amount of CME, ARYA OD given 1/31/22, treating for NVD and NVE to prevent NVG  - PRP OD 2/16/22, with fill in 03/16/22, 7/20/22  - 4/22: without NVI and NVA, NV looks to be fully regressed or close to it, PRP only 4 weeks out, will bring back in 1 month for eval for DFE/OCT mac- pt may need more PRP vs ARYA vs  observation  - 7/1/22: OD NVD with diffuse macular DBH.   - 7/20/22 for PRP OD fill  - 11/15/22; no NVI or NVA; odd NVD vs collaterals noted OD.  Needs IVFA prior to continued injections or laser.  IOP and vision stable  RTC IVFA 3 weeks then DFE, OCTmac 6 weeks     2) Age-related nuclear cataract, left eye  - High threshold for surgery given functionally monocular, cleared by [facility] for surgery  - Last MRX on 1/31/22  - Monitor    3) ERM, OU  - H/o ERM OD s/p 25g PPV, ERM/ILM peel with FAx 5/23/17. Stable  - H/o ERM OS, NVS, OCT mac 7/1/22 stable  - Monitor    4) Pseudophakia of right eye  - Done 4/24/12 by Dr. Little, SN60WF 18.5D  - Monitor    5) Posterior vitreous detachment, left eye  - No holes/tears/breaks on DFE  - RD precautions    6) Dry eye syndrome of both lacrimal glands  - AT 4-6x/day as needed, lid hygiene  - No complaints today    RTC IVFA transit OD then another appt DFE and OCT mac

## 2022-11-16 ENCOUNTER — OFFICE VISIT (OUTPATIENT)
Dept: OTOLARYNGOLOGY | Facility: CLINIC | Age: 77
End: 2022-11-16
Payer: MEDICARE

## 2022-11-16 VITALS
TEMPERATURE: 98 F | HEIGHT: 62 IN | WEIGHT: 195 LBS | DIASTOLIC BLOOD PRESSURE: 86 MMHG | BODY MASS INDEX: 35.88 KG/M2 | SYSTOLIC BLOOD PRESSURE: 144 MMHG

## 2022-11-16 DIAGNOSIS — H92.03 OTALGIA OF BOTH EARS: Primary | ICD-10-CM

## 2022-11-16 DIAGNOSIS — H69.90 DYSFUNCTION OF EUSTACHIAN TUBE, UNSPECIFIED LATERALITY: ICD-10-CM

## 2022-11-16 DIAGNOSIS — H90.5 SENSORINEURAL HEARING LOSS (SNHL), UNSPECIFIED LATERALITY: ICD-10-CM

## 2022-11-16 DIAGNOSIS — M26.629 ARTHRALGIA OF TEMPOROMANDIBULAR JOINT, UNSPECIFIED LATERALITY: ICD-10-CM

## 2022-11-16 DIAGNOSIS — M79.18 MYOFASCIAL PAIN: ICD-10-CM

## 2022-11-16 PROCEDURE — 99214 OFFICE O/P EST MOD 30 MIN: CPT | Mod: PBBFAC | Performed by: NURSE PRACTITIONER

## 2022-11-16 PROCEDURE — 99213 OFFICE O/P EST LOW 20 MIN: CPT | Mod: S$PBB,,, | Performed by: NURSE PRACTITIONER

## 2022-11-16 PROCEDURE — 99213 PR OFFICE/OUTPT VISIT, EST, LEVL III, 20-29 MIN: ICD-10-PCS | Mod: S$PBB,,, | Performed by: NURSE PRACTITIONER

## 2022-11-16 RX ORDER — DICLOFENAC SODIUM 10 MG/G
2 GEL TOPICAL 4 TIMES DAILY
Qty: 100 G | Refills: 0 | Status: SHIPPED | OUTPATIENT
Start: 2022-11-16

## 2022-11-16 RX ORDER — FLUTICASONE PROPIONATE 50 MCG
2 SPRAY, SUSPENSION (ML) NASAL 2 TIMES DAILY
Qty: 16 G | Refills: 11 | Status: SHIPPED | OUTPATIENT
Start: 2022-11-16 | End: 2022-12-16

## 2022-11-16 NOTE — PROGRESS NOTES
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 She says that the dizziness rather suddenly after she had a trip to the emergency room in August for fever and was found to have UTI and bacteremia. She describes the dizziness as a feeling of lightheadedness where she feels she may pass out; she does not experience other symptoms such as sweating or heart palpitations during these episodes. She has not actually passed out however. These symptoms occur for a few seconds and are always provoked by positional changes such as leaning over to tie her shoes, rolling over in bed, or sitting up from bed. She uses a pincer to reach for things on the ground and takes her time to sit up to avoid her dizziness symptoms    12/21/20: No c/o. Reports she is no longer having any dizzy episodes. Feels they were r/t her eye injections which she d/c. Vestibular battery WNL. Audiogram stable.    11/16/2022: C/o otalgia R>L x 1.5mo. She denies any change in hearing, tinnitus, dizziness, or change in otologic hx. She has recently been treated for neck pain and disc issues and is still having symptoms. Denies nasal congestion or rhinitis.     ROS:   10-point review of systems negative except per HPI      Review of patient's allergies indicates:   Allergen Reactions    Codeine Rash       Past Medical History:   Diagnosis Date    Anxiety 06/13/2022    Cataract of left eye 06/13/2022    Central retinal vein occlusion of right eye 06/13/2022    Female bladder prolapse 06/13/2022    Gastroesophageal reflux disease 06/13/2022    Hyperlipidemia 06/13/2022    Hypertension 06/13/2022    Mild CAD     Osteopenia of neck of femur 06/13/2022    Urinary incontinence 06/13/2022    Zenker diverticulum        Past Surgical  History:   Procedure Laterality Date    CHOLECYSTECTOMY      COLONOSCOPY  2017    HYSTERECTOMY      KNEE SURGERY Right     x2; states MD wanted to do replacement but pt declined    SHOULDER SURGERY  1995    TONSILLECTOMY      VITRECTOMY         Social History     Socioeconomic History    Marital status:    Tobacco Use    Smoking status: Never    Smokeless tobacco: Never   Substance and Sexual Activity    Alcohol use: Never    Drug use: Never       Family History   Problem Relation Age of Onset    Hypertension Mother     No Known Problems Father     No Known Problems Sister     No Known Problems Brother     No Known Problems Maternal Aunt     No Known Problems Maternal Uncle     No Known Problems Paternal Aunt     No Known Problems Paternal Uncle     Diabetes Maternal Grandmother     No Known Problems Maternal Grandfather     No Known Problems Paternal Grandmother     No Known Problems Paternal Grandfather     Amblyopia Neg Hx     Blindness Neg Hx     Cancer Neg Hx     Cataracts Neg Hx     Glaucoma Neg Hx     Macular degeneration Neg Hx     Retinal detachment Neg Hx     Strabismus Neg Hx     Stroke Neg Hx     Thyroid disease Neg Hx        Outpatient Encounter Medications as of 11/16/2022   Medication Sig Dispense Refill    aspirin (ECOTRIN) 81 MG EC tablet Take 81 mg by mouth once daily.      atorvastatin (LIPITOR) 20 MG tablet Take 1 tablet (20 mg total) by mouth once daily. 90 tablet 0    b complex vitamins capsule Take 1 capsule by mouth once daily.      calcium carbonate (OS-SREEDHAR) 500 mg calcium (1,250 mg) chewable tablet Take 1 tablet by mouth once daily.      docusate sodium (COLACE) 100 MG capsule Take 100 mg by mouth once daily at 6am.      famotidine (PEPCID) 20 MG tablet Take 1 tablet (20 mg total) by mouth 2 (two) times daily. 180 tablet 0    losartan (COZAAR) 50 MG tablet Take 1 tablet (50 mg total) by mouth once daily. 90 tablet 0    methylcellulose oral powder Take 3.4 g by mouth once daily.       "metroNIDAZOLE (METROGEL) 0.75 % gel Apply topically 2 (two) times daily. Apply to face PRN for rosacea related rash 45 g 1    multivitamin capsule Take 1 capsule by mouth once daily.      sertraline (ZOLOFT) 25 MG tablet Take 2 tablets (50 mg total) by mouth once daily. 60 tablet 5    sertraline (ZOLOFT) 50 MG tablet Take 50 mg by mouth once daily.      vitamin D (VITAMIN D3) 1000 units Tab Take 1,000 Units by mouth once daily.       No facility-administered encounter medications on file as of 11/16/2022.       Physical Exam:  Vitals:    11/16/22 1126   BP: (!) 144/86   BP Location: Right arm   Patient Position: Sitting   BP Method: Large (Manual)   Temp: 98.2 °F (36.8 °C)   TempSrc: Oral   Weight: 88.5 kg (195 lb)   Height: 5' 2.01" (1.575 m)       General: NAD, voice normal  Neuro: AAO, CN II - XII grossly intact  Head/ Face: NCAT, symmetric, sensations intact bilaterally. There is some subluxation of right TMJ with movement  Eyes: EOMI, PERRL  Ears: externally normal with grossly normal hearing  AD: EAC patent, TM intact, no middle ear effusion, no retractions. Able to autoinsufflate with mild difficulty  AS: EAC patent, TM intact, no middle ear effusion, no retractions. Able to autoinsufflate with mild difficulty  Nose: bilateral nares patent, midline septum, no rhinorrhea, no external deformity, no turbinate hypertrophy  OC/OP: MMM, no intraoral lesions, no trismus, dentition is poor (edentulous upper), no uvular deviation, bilaterally symmetric soft palate elevation, palatoglossus and palatopharyngeal fold wnl; tonsils are symmetric/absent. Marked pterygoid TTP R>L  Indirect laryngoscopy: deferred due to patient intolerance  Neck: tight musculature with reported tenderness to lateral/posterior palpation & movement, supple, no LAD, no thyromegaly  Respiratory: nonlabored, no wheezing, bilateral chest rise  Cardiovascular: RRR  Gastrointestinal: S NT ND  Skin: warm, no lesions  Musculoskeletal: 5/5 " strength  Psych: Appropriate affect/mood     Pertinent Data:  ? LABS:  ? AUDIO:           ? PATH:      Imaging:   I personally reviewed the following images:        Assessment/Plan:  77 y.o. female with otalgia 2/2 myofascial pain, TMJ, ?ETD. Hx of b/l SNHL  - Warm/cool compresses  - Voltaren gel QID prn  - Jaw exercises  - Continue neck exercises  - Flonase BID  - Autoinsufflation  - Update audio  - RTC 6 weeks. Ok for telemed    Nicole Upton NP

## 2022-11-28 DIAGNOSIS — K21.9 GASTROESOPHAGEAL REFLUX DISEASE, UNSPECIFIED WHETHER ESOPHAGITIS PRESENT: Primary | ICD-10-CM

## 2022-11-28 RX ORDER — FAMOTIDINE 20 MG/1
20 TABLET, FILM COATED ORAL 2 TIMES DAILY
Qty: 180 TABLET | Refills: 0 | Status: SHIPPED | OUTPATIENT
Start: 2022-11-28 | End: 2023-01-18 | Stop reason: SDUPTHER

## 2022-11-30 ENCOUNTER — CLINICAL SUPPORT (OUTPATIENT)
Dept: OPHTHALMOLOGY | Facility: CLINIC | Age: 77
End: 2022-11-30
Payer: MEDICARE

## 2022-11-30 VITALS — WEIGHT: 194 LBS | BODY MASS INDEX: 35.7 KG/M2 | HEIGHT: 62 IN

## 2022-11-30 DIAGNOSIS — H34.8110 CENTRAL RETINAL VEIN OCCLUSION WITH MACULAR EDEMA OF RIGHT EYE: Primary | ICD-10-CM

## 2022-11-30 PROCEDURE — 92235 FLUORESCEIN ANGRPH MLTIFRAME: CPT | Mod: PBBFAC,PO | Performed by: STUDENT IN AN ORGANIZED HEALTH CARE EDUCATION/TRAINING PROGRAM

## 2022-11-30 PROCEDURE — 99213 OFFICE O/P EST LOW 20 MIN: CPT | Mod: PBBFAC,PO

## 2022-11-30 RX ORDER — FLUORESCEIN 500 MG/ML
5 INJECTION INTRAVENOUS
Status: COMPLETED | OUTPATIENT
Start: 2022-11-30 | End: 2022-11-30

## 2022-11-30 RX ADMIN — FLUORESCEIN 500 MG: 500 INJECTION INTRAVENOUS at 09:11

## 2022-11-30 NOTE — PROGRESS NOTES
[facility]   Last edited by Kelli aHrt RN on 11/30/2022  8:19 AM.            Assessment /Plan     For exam results, see Encounter Report.    Central retinal vein occlusion with macular edema of right eye  -     fluorescein 500 mg/5 mL (10 %) injection 500 mg    OCT RNFL 7/1/22  OD: 9/10, 105um, all white/green  OS: 8/10, 86um, all green     OCT Mac 7/1/22  OD: 539um, large cystic IRF  OS: 322um, stable ERM, no IRF/SRF    OCT MAC 11/15/22  OD:  421 generalized edema focused nasally; grossly stable from prior but improved form 4/2022 and prior  OS:  326, stable ERM, no IRF/SRF     IVFA 11/30/22: overexposed image ou, prp scars and expected disc staining. No leakage ou     ==================    1) CRVO OD with CME  - Unclear timing although documented 10/2019  - IVFA unable to be done due to poor venous access  - Carotid showed <50% stenosis OU.  INJECTION LOG  - Avastin (10/2019)- no response, ARYA 2 (7/14/2020) - poor response  - Ozurdex (12/4/2019) - worked great previously  - Eylea 3/3 (8/19/2020, 9/17/2020, 10/16/20  - Plan most of 2020 was to proceed with ozurdex injection if no improvement in mac edema. After discussion with Dr. Gerer, does not think ozurdex would provide significant benefit over triessence. Also, there is concern that vein occlusion has worsened given lack of improvement and significant worsening of edema and vision over past 5 months at end of 2020.  - Patient elected to stop injections in 2020 given she has not had significant vision changes with injections  - Exam 1/31/22 no NVI or NVA on SLE and gonio. New NVD & frond of NVE inferotemporally.  - OCT mac 1/31/22 shows large amount of CME, ARYA OD given 1/31/22, treating for NVD and NVE to prevent NVG  - PRP OD 2/16/22, with fill in 03/16/22, 7/20/22  - 4/22: without NVI and NVA, NV looks to be fully regressed or close to it, PRP only 4 weeks out, will bring back in 1 month for eval for DFE/OCT mac- pt may need more PRP vs ARYA  vs observation  - 7/1/22: OD NVD with diffuse macular DBH.   - 7/20/22 for PRP OD fill  - 11/15/22; no NVI or NVA; odd NVD vs collaterals noted OD.  Needs IVFA prior to continued injections or laser.  IOP and vision stable  - 11/30/22; no nvi/nva, no nvd or nve. Ivfa performed with overexposed image but no leakage ou. Will start spacing out visits as long as she is stable    2) Age-related nuclear cataract, left eye  - High threshold for surgery given functionally monocular, cleared by [facility] for surgery  - Last MRX on 1/31/22  - Monitor    3) ERM, OU  - H/o ERM OD s/p 25g PPV, ERM/ILM peel with FAx 5/23/17. Stable  - H/o ERM OS, NVS, OCT mac 7/1/22 stable  - Monitor    4) Pseudophakia of right eye  - Done 4/24/12 by Dr. Little, SN60WF 18.5D  - Monitor    5) Posterior vitreous detachment, left eye  - No holes/tears/breaks on DFE  - RD precautions    6) Dry eye syndrome of both lacrimal glands  - AT 4-6x/day as needed, lid hygiene  - No complaints today    RTC 2-3 months dfe, oct mac

## 2022-12-01 ENCOUNTER — TELEPHONE (OUTPATIENT)
Dept: OPHTHALMOLOGY | Facility: CLINIC | Age: 77
End: 2022-12-01
Payer: MEDICARE

## 2022-12-01 NOTE — TELEPHONE ENCOUNTER
Post procedure follow up call,  No answer, message states no available, no able to leave a message.

## 2022-12-16 ENCOUNTER — OFFICE VISIT (OUTPATIENT)
Dept: FAMILY MEDICINE | Facility: CLINIC | Age: 77
End: 2022-12-16
Payer: MEDICARE

## 2022-12-16 VITALS
HEART RATE: 86 BPM | RESPIRATION RATE: 18 BRPM | SYSTOLIC BLOOD PRESSURE: 138 MMHG | OXYGEN SATURATION: 95 % | TEMPERATURE: 98 F | HEIGHT: 62 IN | WEIGHT: 192.25 LBS | DIASTOLIC BLOOD PRESSURE: 83 MMHG | BODY MASS INDEX: 35.38 KG/M2

## 2022-12-16 DIAGNOSIS — R22.41 NODULE OF SKIN OF RIGHT FOOT: ICD-10-CM

## 2022-12-16 DIAGNOSIS — R01.1 SYSTOLIC MURMUR: ICD-10-CM

## 2022-12-16 DIAGNOSIS — R19.5 POSITIVE FIT (FECAL IMMUNOCHEMICAL TEST): ICD-10-CM

## 2022-12-16 DIAGNOSIS — M85.80 OSTEOPENIA, UNSPECIFIED LOCATION: Primary | ICD-10-CM

## 2022-12-16 DIAGNOSIS — F41.9 ANXIETY: ICD-10-CM

## 2022-12-16 DIAGNOSIS — M65.30 TRIGGER FINGER OF RIGHT HAND, UNSPECIFIED FINGER: ICD-10-CM

## 2022-12-16 PROCEDURE — 96372 THER/PROPH/DIAG INJ SC/IM: CPT | Mod: PBBFAC

## 2022-12-16 PROCEDURE — 99215 OFFICE O/P EST HI 40 MIN: CPT | Mod: PBBFAC

## 2022-12-16 RX ADMIN — DENOSUMAB 60 MG: 60 INJECTION SUBCUTANEOUS at 10:12

## 2022-12-16 NOTE — PROGRESS NOTES
[facility]  [facility]      Subjective:      Nu Denny is a 77 y.o. female who is presenting for routine follow up. Denies needing any refills.     +FIT: Didn't get call from [facility], called today in office. They recived referral on 12/15 and will call her to scheduled. Confirmed number.  Denies abdominal pain, n/v, GERD symptoms, diarrhea, blood per rectum or melena.     Neck Pain: Resolved. Doing well s/p Celestone shot at in 10/1022. Denies any shoulder pain, numbness/tingling to bilat upper extremity, or weakness (including dropping objects and poor ).    Osteopenia: Coming for  Prolia dose #2, tolerated 1st injection well. Taking Vitamin D and Calcium. Walks around house, in the yard, and dogs/cats frequently. Has rails and ramp at home 2/2 caring for  and mother in past. Doesn't have any rugs. Aware of home safety. Wears supportive shoes. Denies falls, gait instability or recent trauma.     Anxiety: Taking Zoloft 50 mg qHS, which continues to help. Most of anxiety is around medical/health, sometimes family stress. Doing well overall. Meets every Monday w/ sister in law and other ladies for social gathering. Denies any depressed mood. Sleeping well. Eatring well, but is trying to loose weight so eating more salds and cutting out fried food/baked goods.     Right 4th finger trigger finger: Notices when cooking (I.e chopping objects). Able to "un lock easily" but sometimes has mild pain afterwards. Denies any swelling, warmth or erythema. Not taking any medication. Not using Ice/Heat tx. Not doing any Hep tx.     Hard Bump on dosrum of R foot: Recently noticed. Firm, small, non mobile.  Not tender and doesn't have foot/ankle pain. Denies recent trauma or injury. Hasn't changed activity. Hasn't been warning new shoes. Hasn't noticed before but didn't touch feet much in past 2/2 wasn't mobile enough.       Health Care Maintenance:     Breast: MMG 10/22, Birads 1. Repeat " in 1 year.   Cervix: Still has cervix. Denies hx of abnormal pap smear in past. Doesn't want pelvic exam at this time.   Lung: Never smoked.   Bone :  Last DEXA 1/2021 Osteopenia w/ Elevated Frax. Mother w/ hip facture. Taking Prolia since 6/2022, can't take bisphosphonate 2/2 hx of zenker diverticulum.  Colon: + Fit, [facility] referral sent as noted above.    Immunizations: UTD.     Review of Systems: As noted in HPI.     Past Medical History:   Diagnosis Date    Anxiety 06/13/2022    Cataract of left eye 06/13/2022    Central retinal vein occlusion of right eye 06/13/2022    Female bladder prolapse 06/13/2022    Gastroesophageal reflux disease 06/13/2022    Hyperlipidemia 06/13/2022    Hypertension 06/13/2022    Mild CAD     Osteopenia of neck of femur 06/13/2022    Urinary incontinence 06/13/2022    Zenker diverticulum        Past Surgical History:   Procedure Laterality Date    CHOLECYSTECTOMY      COLONOSCOPY  2017    HYSTERECTOMY      KNEE SURGERY Right     x2; states MD wanted to do replacement but pt declined    SHOULDER SURGERY  1995    TONSILLECTOMY      VITRECTOMY         Family History   Problem Relation Age of Onset    Hypertension Mother     No Known Problems Father     No Known Problems Sister     No Known Problems Brother     No Known Problems Maternal Aunt     No Known Problems Maternal Uncle     No Known Problems Paternal Aunt     No Known Problems Paternal Uncle     Diabetes Maternal Grandmother     No Known Problems Maternal Grandfather     No Known Problems Paternal Grandmother     No Known Problems Paternal Grandfather     Amblyopia Neg Hx     Blindness Neg Hx     Cancer Neg Hx     Cataracts Neg Hx     Glaucoma Neg Hx     Macular degeneration Neg Hx     Retinal detachment Neg Hx     Strabismus Neg Hx     Stroke Neg Hx     Thyroid disease Neg Hx        Social History     Socioeconomic History    Marital status:    Tobacco Use    Smoking status:  Never    Smokeless tobacco: Never   Substance and Sexual Activity    Alcohol use: Never    Drug use: Never       Current Outpatient Medications   Medication Sig Dispense Refill    aspirin (ECOTRIN) 81 MG EC tablet Take 81 mg by mouth once daily.      atorvastatin (LIPITOR) 20 MG tablet Take 1 tablet (20 mg total) by mouth once daily. 90 tablet 0    b complex vitamins capsule Take 1 capsule by mouth once daily.      calcium carbonate (OS-SREEDHAR) 500 mg calcium (1,250 mg) chewable tablet Take 1 tablet by mouth once daily.      diclofenac sodium (VOLTAREN) 1 % Gel Apply 2 g topically 4 (four) times daily. PRN for pain 100 g 0    docusate sodium (COLACE) 100 MG capsule Take 100 mg by mouth once daily at 6am.      famotidine (PEPCID) 20 MG tablet Take 1 tablet (20 mg total) by mouth 2 (two) times daily. 180 tablet 0    fluticasone propionate (FLONASE) 50 mcg/actuation nasal spray 2 sprays (100 mcg total) by Each Nostril route 2 (two) times a day. (Patient not taking: Reported on 11/30/2022) 16 g 11    losartan (COZAAR) 50 MG tablet Take 1 tablet (50 mg total) by mouth once daily. 90 tablet 0    methylcellulose oral powder Take 3.4 g by mouth once daily.      metroNIDAZOLE (METROGEL) 0.75 % gel Apply topically 2 (two) times daily. Apply to face PRN for rosacea related rash 45 g 1    multivitamin capsule Take 1 capsule by mouth once daily.      sertraline (ZOLOFT) 25 MG tablet Take 2 tablets (50 mg total) by mouth once daily. (Patient not taking: Reported on 11/30/2022) 60 tablet 5    sertraline (ZOLOFT) 50 MG tablet Take 50 mg by mouth once daily.      vitamin D (VITAMIN D3) 1000 units Tab Take 1,000 Units by mouth once daily.       Current Facility-Administered Medications   Medication Dose Route Frequency Provider Last Rate Last Admin    denosumab (PROLIA) injection 60 mg  60 mg Subcutaneous 1 time in Clinic/HOD Karly Aragon MD           Review of patient's allergies indicates:   Allergen Reactions  "   Codeine Rash       Objective:   /83 (BP Location: Left arm, Patient Position: Sitting, BP Method: Large (Automatic))   Pulse 82   Temp 98.2 °F (36.8 °C) (Oral)   Resp 18   Ht 5' 2" (1.575 m)   Wt 87.2 kg (192 lb 3.9 oz)   SpO2 96%   BMI 35.16 kg/m²      Physical Exam   Constitutional: normal appearance.   Cardiovascular: Normal rate, regular rhythm and normal pulses.   + systolic murmur  Pulmonary:      Effort: Pulmonary effort is normal.      Breath sounds: Normal breath sounds.     Abdominal: Normal appearance.   Musculoskeletal:      Right lower leg: No edema.      Left lower leg: No edema.      Comments: Pea sized nodule, firm, immobile w/o overlying skin changes on dorsum of R mid foot foot between 3rd and 4th metatarsal.     Bilat hands w/o deformity or overlying skin changes.    Neurological: She is alert.   Skin: Skin is warm and dry. Capillary refill takes less than 2 seconds.   Psychiatric: Mood normal.      Assessment:   77 y.o. with        1. Osteopenia, unspecified location    2. Anxiety    3. Systolic murmur    4. Positive FIT (fecal immunochemical test)    5. Trigger finger of right hand, unspecified finger    6. Nodule of skin of right foot         Plan:     Orders Placed This Encounter    denosumab (PROLIA) injection 60 mg   -Prolia Dose #2 given today. Continue Vitamin D/Calcium. Counseled on safety and fall precautions.   -Continue Zolfot 50 mg for Anxiety. Pt doing well clinically, will CTM. Counseled on stress coping techniques. Doesn't want therapy at this time.   -Ordered ECHO, didn't hear murmur at previous visits. Pt w/o any cardiorespiratory symptoms. RTC precautions given, will CTM.   -Trigger Finger R 4th finger. Gave Hep tx and recommend night splint. Will CTM.   -Right foot nodule, seems bony in nature w/o red flags. Will CTM.   -Called GI, they have pt's referral. Pt aware of referral, counseled on importance of f/u.      The patient's diagnosis and medications were " discussed.    Karly Aragon MD   [facility] PGY-III  12/16/2022

## 2023-01-18 DIAGNOSIS — I10 PRIMARY HYPERTENSION: ICD-10-CM

## 2023-01-18 DIAGNOSIS — F41.9 ANXIETY: ICD-10-CM

## 2023-01-18 DIAGNOSIS — K21.9 GASTROESOPHAGEAL REFLUX DISEASE, UNSPECIFIED WHETHER ESOPHAGITIS PRESENT: ICD-10-CM

## 2023-01-18 RX ORDER — ATORVASTATIN CALCIUM 20 MG/1
20 TABLET, FILM COATED ORAL DAILY
Qty: 90 TABLET | Refills: 0 | Status: SHIPPED | OUTPATIENT
Start: 2023-01-18 | End: 2023-04-26 | Stop reason: SDUPTHER

## 2023-01-18 RX ORDER — FAMOTIDINE 20 MG/1
20 TABLET, FILM COATED ORAL 2 TIMES DAILY
Qty: 180 TABLET | Refills: 0 | Status: SHIPPED | OUTPATIENT
Start: 2023-01-18 | End: 2023-03-31 | Stop reason: SDUPTHER

## 2023-01-18 RX ORDER — SERTRALINE HYDROCHLORIDE 50 MG/1
50 TABLET, FILM COATED ORAL DAILY
Qty: 90 TABLET | Refills: 0 | Status: SHIPPED | OUTPATIENT
Start: 2023-01-18 | End: 2023-08-28 | Stop reason: SDUPTHER

## 2023-01-18 RX ORDER — METRONIDAZOLE 7.5 MG/G
GEL TOPICAL 2 TIMES DAILY
Qty: 45 G | Refills: 1 | Status: SHIPPED | OUTPATIENT
Start: 2023-01-18 | End: 2023-03-31 | Stop reason: SDUPTHER

## 2023-01-18 RX ORDER — LOSARTAN POTASSIUM 50 MG/1
50 TABLET ORAL DAILY
Qty: 90 TABLET | Refills: 0 | Status: SHIPPED | OUTPATIENT
Start: 2023-01-18 | End: 2023-04-26 | Stop reason: SDUPTHER

## 2023-01-24 ENCOUNTER — HOSPITAL ENCOUNTER (OUTPATIENT)
Dept: CARDIOLOGY | Facility: HOSPITAL | Age: 78
Discharge: HOME | End: 2023-01-24
Attending: STUDENT IN AN ORGANIZED HEALTH CARE EDUCATION/TRAINING PROGRAM
Payer: MEDICARE

## 2023-01-24 VITALS
SYSTOLIC BLOOD PRESSURE: 169 MMHG | HEIGHT: 62 IN | DIASTOLIC BLOOD PRESSURE: 80 MMHG | WEIGHT: 192 LBS | BODY MASS INDEX: 35.33 KG/M2

## 2023-01-24 DIAGNOSIS — R01.1 SYSTOLIC MURMUR: ICD-10-CM

## 2023-01-24 LAB
AORTIC ROOT ANNULUS: 3 CM
AV INDEX (PROSTH): 0.8
AV MEAN GRADIENT: 3 MMHG
AV PEAK GRADIENT: 6 MMHG
AV VALVE AREA: 2.4 CM2
AV VELOCITY RATIO: 0.89
BSA FOR ECHO PROCEDURE: 1.95 M2
CV ECHO LV RWT: 0.59 CM
DOP CALC AO PEAK VEL: 1.2 M/S
DOP CALC AO VTI: 30.4 CM
DOP CALC LVOT AREA: 3 CM2
DOP CALC LVOT DIAMETER: 1.96 CM
DOP CALC LVOT PEAK VEL: 1.07 M/S
DOP CALC LVOT STROKE VOLUME: 72.98 CM3
DOP CALC MV VTI: 34.7 CM
DOP CALCLVOT PEAK VEL VTI: 24.2 CM
E WAVE DECELERATION TIME: 200.26 MSEC
E/A RATIO: 0.86
E/E' RATIO: 12.8 M/S
ECHO LV POSTERIOR WALL: 1.2 CM (ref 0.6–1.1)
EJECTION FRACTION: 65 %
FRACTIONAL SHORTENING: 36 % (ref 28–44)
HR MV ECHO: 70 BPM
INTERVENTRICULAR SEPTUM: 0.85 CM (ref 0.6–1.1)
LEFT ATRIUM SIZE: 4.29 CM
LEFT ATRIUM VOLUME INDEX MOD: 28.7 ML/M2
LEFT ATRIUM VOLUME MOD: 54 CM3
LEFT INTERNAL DIMENSION IN SYSTOLE: 2.61 CM (ref 2.1–4)
LEFT VENTRICLE DIASTOLIC VOLUME INDEX: 39.07 ML/M2
LEFT VENTRICLE DIASTOLIC VOLUME: 73.45 ML
LEFT VENTRICLE MASS INDEX: 72 G/M2
LEFT VENTRICLE SYSTOLIC VOLUME INDEX: 13.3 ML/M2
LEFT VENTRICLE SYSTOLIC VOLUME: 24.95 ML
LEFT VENTRICULAR INTERNAL DIMENSION IN DIASTOLE: 4.08 CM (ref 3.5–6)
LEFT VENTRICULAR MASS: 135.74 G
LV LATERAL E/E' RATIO: 10.67 M/S
LV SEPTAL E/E' RATIO: 16 M/S
LVOT MG: 2.32 MMHG
LVOT MV: 0.7 CM/S
MV MEAN GRADIENT: 2 MMHG
MV PEAK A VEL: 1.11 M/S
MV PEAK E VEL: 0.96 M/S
MV PEAK GRADIENT: 5 MMHG
MV STENOSIS PRESSURE HALF TIME: 58.08 MS
MV VALVE AREA BY CONTINUITY EQUATION: 2.1 CM2
MV VALVE AREA P 1/2 METHOD: 3.79 CM2
PISA MRMAX VEL: 3.85 M/S
PISA TR MAX VEL: 2.35 M/S
RA PRESSURE: 3 MMHG
RA WIDTH: 4.1 CM
TDI LATERAL: 0.09 M/S
TDI SEPTAL: 0.06 M/S
TDI: 0.08 M/S
TR MAX PG: 22 MMHG
TRICUSPID ANNULAR PLANE SYSTOLIC EXCURSION: 2.34 CM
TV REST PULMONARY ARTERY PRESSURE: 25 MMHG

## 2023-01-24 PROCEDURE — 93306 TTE W/DOPPLER COMPLETE: CPT

## 2023-01-25 ENCOUNTER — HOSPITAL ENCOUNTER (OUTPATIENT)
Dept: CARDIOLOGY | Facility: HOSPITAL | Age: 78
Discharge: HOME | End: 2023-01-25
Attending: STUDENT IN AN ORGANIZED HEALTH CARE EDUCATION/TRAINING PROGRAM
Payer: MEDICARE

## 2023-01-25 DIAGNOSIS — R00.2 PALPITATIONS: ICD-10-CM

## 2023-01-25 PROCEDURE — 93226 XTRNL ECG REC<48 HR SCAN A/R: CPT

## 2023-01-30 LAB
OHS CV EVENT MONITOR DAY: 0
OHS CV HOLTER LENGTH DECIMAL HOURS: 48
OHS CV HOLTER LENGTH HOURS: 48
OHS CV HOLTER LENGTH MINUTES: 0
OHS CV HOLTER SINUS AVERAGE HR: 75
OHS CV HOLTER SINUS MAX HR: 116

## 2023-02-02 ENCOUNTER — OFFICE VISIT (OUTPATIENT)
Dept: OPHTHALMOLOGY | Facility: CLINIC | Age: 78
End: 2023-02-02
Payer: MEDICARE

## 2023-02-02 VITALS — BODY MASS INDEX: 35.33 KG/M2 | HEIGHT: 62 IN | WEIGHT: 192 LBS

## 2023-02-02 DIAGNOSIS — H34.8110 CENTRAL RETINAL VEIN OCCLUSION WITH MACULAR EDEMA OF RIGHT EYE: Primary | ICD-10-CM

## 2023-02-02 PROCEDURE — 92134 CPTRZ OPH DX IMG PST SGM RTA: CPT | Mod: PBBFAC,PO | Performed by: OPHTHALMOLOGY

## 2023-02-02 PROCEDURE — 92134 CPTRZ OPH DX IMG PST SGM RTA: CPT | Mod: PBBFAC,PO,GC | Performed by: STUDENT IN AN ORGANIZED HEALTH CARE EDUCATION/TRAINING PROGRAM

## 2023-02-02 PROCEDURE — 99213 OFFICE O/P EST LOW 20 MIN: CPT | Mod: PBBFAC,PO | Performed by: STUDENT IN AN ORGANIZED HEALTH CARE EDUCATION/TRAINING PROGRAM

## 2023-02-02 NOTE — PROGRESS NOTES
Assessment /Plan     For exam results, see Encounter Report.    Central retinal vein occlusion with macular edema of right eye                         OCT RNFL 7/1/22  OD: 9/10, 105um, all white/green  OS: 8/10, 86um, all green     OCT Mac 7/1/22  OD: 539um, large cystic IRF  OS: 322um, stable ERM, no IRF/SRF    OCT MAC 11/15/22  OD:  421 generalized edema focused nasally; grossly stable from prior but improved form 4/2022 and prior  OS:  326, stable ERM, no IRF/SRF   2/2/23 488 stable edema //  tr erm, stable     IVFA 11/30/22: overexposed image ou, prp scars and expected disc staining. No leakage ou     ==================    1) CRVO OD with CME  - Unclear timing although documented 10/2019  - IVFA unable to be done due to poor venous access  - Carotid showed <50% stenosis OU.  INJECTION LOG  - Avastin (10/2019)- no response, ARYA 2 (7/14/2020) - poor response  - Ozurdex (12/4/2019) - worked great previously  - Eylea 3/3 (8/19/2020, 9/17/2020, 10/16/20  - Plan most of 2020 was to proceed with ozurdex injection if no improvement in mac edema. After discussion with Dr. Greer, does not think ozurdex would provide significant benefit over triessence. Also, there is concern that vein occlusion has worsened given lack of improvement and significant worsening of edema and vision over past 5 months at end of 2020.  - Patient elected to stop injections in 2020 given she has not had significant vision changes with injections  - Exam 1/31/22 no NVI or NVA on SLE and gonio. New NVD & frond of NVE inferotemporally.  - OCT mac 1/31/22 shows large amount of CME, ARYA OD given 1/31/22, treating for NVD and NVE to prevent NVG  - PRP OD 2/16/22, with fill in 03/16/22, 7/20/22  - 4/22: without NVI and NVA, NV looks to be fully regressed or close to it, PRP only 4 weeks out, will bring back in 1 month for eval for DFE/OCT mac- pt may need more PRP vs ARYA vs observation  - 7/1/22: OD NVD with diffuse macular DBH.   - 7/20/22  for PRP OD fill  - 11/15/22; no NVI or NVA; odd NVD vs collaterals noted OD.  Needs IVFA prior to continued injections or laser.  IOP and vision stable  - 11/30/22; no nvi/nva, no nvd or nve. Ivfa performed with overexposed image but no leakage ou. Will start spacing out visits as long as she is stable  - 2/2/23 doing well. Stable edema and vision. No NVI. Continue to space out visits.    2) Age-related nuclear cataract, left eye  - High threshold for surgery given functionally monocular, cleared by [facility] for surgery  - Last MRX on 1/31/22  - Monitor    3) ERM, OU  - H/o ERM OD s/p 25g PPV, ERM/ILM peel with FAx 5/23/17. Stable  - H/o ERM OS, NVS, OCT mac 7/1/22 stable  - Monitor    4) Pseudophakia of right eye  - Done 4/24/12 by Dr. Little, SN60WF 18.5D  - Monitor    5) Posterior vitreous detachment, left eye  - No holes/tears/breaks on DFE  - RD precautions    6) Dry eye syndrome of both lacrimal glands  - AT 4-6x/day as needed, lid hygiene  - No complaints today    RTC 4 months dfe, oct mac

## 2023-02-07 ENCOUNTER — OFFICE VISIT (OUTPATIENT)
Dept: OTOLARYNGOLOGY | Facility: CLINIC | Age: 78
End: 2023-02-07
Payer: MEDICARE

## 2023-02-07 ENCOUNTER — CLINICAL SUPPORT (OUTPATIENT)
Dept: AUDIOLOGY | Facility: HOSPITAL | Age: 78
End: 2023-02-07
Payer: MEDICARE

## 2023-02-07 VITALS
TEMPERATURE: 98 F | RESPIRATION RATE: 16 BRPM | HEIGHT: 62 IN | SYSTOLIC BLOOD PRESSURE: 138 MMHG | BODY MASS INDEX: 35.33 KG/M2 | HEART RATE: 67 BPM | DIASTOLIC BLOOD PRESSURE: 88 MMHG | WEIGHT: 192 LBS

## 2023-02-07 DIAGNOSIS — H90.5 SENSORINEURAL HEARING LOSS (SNHL), UNSPECIFIED LATERALITY: Primary | ICD-10-CM

## 2023-02-07 DIAGNOSIS — H90.3 SENSORINEURAL HEARING LOSS (SNHL) OF BOTH EARS: Primary | ICD-10-CM

## 2023-02-07 DIAGNOSIS — H90.5 SENSORINEURAL HEARING LOSS (SNHL), UNSPECIFIED LATERALITY: ICD-10-CM

## 2023-02-07 DIAGNOSIS — M79.18 MYOFASCIAL PAIN: ICD-10-CM

## 2023-02-07 DIAGNOSIS — H92.03 OTALGIA OF BOTH EARS: ICD-10-CM

## 2023-02-07 PROCEDURE — 99213 PR OFFICE/OUTPT VISIT, EST, LEVL III, 20-29 MIN: ICD-10-PCS | Mod: S$PBB,,, | Performed by: NURSE PRACTITIONER

## 2023-02-07 PROCEDURE — 92557 COMPREHENSIVE HEARING TEST: CPT | Performed by: AUDIOLOGIST-HEARING AID FITTER

## 2023-02-07 PROCEDURE — 99213 OFFICE O/P EST LOW 20 MIN: CPT | Mod: S$PBB,,, | Performed by: NURSE PRACTITIONER

## 2023-02-07 PROCEDURE — 99214 OFFICE O/P EST MOD 30 MIN: CPT | Mod: PBBFAC | Performed by: NURSE PRACTITIONER

## 2023-02-07 PROCEDURE — 92567 TYMPANOMETRY: CPT | Performed by: AUDIOLOGIST-HEARING AID FITTER

## 2023-02-07 NOTE — PROGRESS NOTES
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 She says that the dizziness rather suddenly after she had a trip to the emergency room in August for fever and was found to have UTI and bacteremia. She describes the dizziness as a feeling of lightheadedness where she feels she may pass out; she does not experience other symptoms such as sweating or heart palpitations during these episodes. She has not actually passed out however. These symptoms occur for a few seconds and are always provoked by positional changes such as leaning over to tie her shoes, rolling over in bed, or sitting up from bed. She uses a pincer to reach for things on the ground and takes her time to sit up to avoid her dizziness symptoms    12/21/20: No c/o. Reports she is no longer having any dizzy episodes. Feels they were r/t her eye injections which she d/c. Vestibular battery WNL. Audiogram stable.    11/16/2022: C/o otalgia R>L x 1.5mo. She denies any change in hearing, tinnitus, dizziness, or change in otologic hx. She has recently been treated for neck pain and disc issues and is still having symptoms. Denies nasal congestion or rhinitis.     2/7/23: Doing well. States she is no longer having otalgia since neck pain has improved.     ROS:   10-point review of systems negative except per HPI      Review of patient's allergies indicates:   Allergen Reactions    Codeine Rash       Past Medical History:   Diagnosis Date    Anxiety 06/13/2022    Cataract of left eye 06/13/2022    Central retinal vein occlusion of right eye 06/13/2022    Female bladder prolapse 06/13/2022    Gastroesophageal reflux disease 06/13/2022    Hyperlipidemia 06/13/2022    Hypertension 06/13/2022    Mild CAD     Osteopenia of neck of femur  06/13/2022    Urinary incontinence 06/13/2022    Zenker diverticulum        Past Surgical History:   Procedure Laterality Date    CHOLECYSTECTOMY      COLONOSCOPY  2017    HYSTERECTOMY      KNEE SURGERY Right     x2; states MD wanted to do replacement but pt declined    SHOULDER SURGERY  1995    TONSILLECTOMY      VITRECTOMY         Social History     Socioeconomic History    Marital status:    Tobacco Use    Smoking status: Never    Smokeless tobacco: Never   Substance and Sexual Activity    Alcohol use: Never    Drug use: Never       Family History   Problem Relation Age of Onset    Hypertension Mother     No Known Problems Father     No Known Problems Sister     No Known Problems Brother     No Known Problems Maternal Aunt     No Known Problems Maternal Uncle     No Known Problems Paternal Aunt     No Known Problems Paternal Uncle     Diabetes Maternal Grandmother     No Known Problems Maternal Grandfather     No Known Problems Paternal Grandmother     No Known Problems Paternal Grandfather     Amblyopia Neg Hx     Blindness Neg Hx     Cancer Neg Hx     Cataracts Neg Hx     Glaucoma Neg Hx     Macular degeneration Neg Hx     Retinal detachment Neg Hx     Strabismus Neg Hx     Stroke Neg Hx     Thyroid disease Neg Hx        Outpatient Encounter Medications as of 11/16/2022   Medication Sig Dispense Refill    aspirin (ECOTRIN) 81 MG EC tablet Take 81 mg by mouth once daily.      atorvastatin (LIPITOR) 20 MG tablet Take 1 tablet (20 mg total) by mouth once daily. 90 tablet 0    b complex vitamins capsule Take 1 capsule by mouth once daily.      calcium carbonate (OS-SREEDHAR) 500 mg calcium (1,250 mg) chewable tablet Take 1 tablet by mouth once daily.      docusate sodium (COLACE) 100 MG capsule Take 100 mg by mouth once daily at 6am.      famotidine (PEPCID) 20 MG tablet Take 1 tablet (20 mg total) by mouth 2 (two) times daily. 180 tablet 0    losartan (COZAAR) 50 MG tablet Take 1 tablet (50 mg total) by mouth  "once daily. 90 tablet 0    methylcellulose oral powder Take 3.4 g by mouth once daily.      metroNIDAZOLE (METROGEL) 0.75 % gel Apply topically 2 (two) times daily. Apply to face PRN for rosacea related rash 45 g 1    multivitamin capsule Take 1 capsule by mouth once daily.      sertraline (ZOLOFT) 25 MG tablet Take 2 tablets (50 mg total) by mouth once daily. 60 tablet 5    sertraline (ZOLOFT) 50 MG tablet Take 50 mg by mouth once daily.      vitamin D (VITAMIN D3) 1000 units Tab Take 1,000 Units by mouth once daily.       No facility-administered encounter medications on file as of 11/16/2022.       Physical Exam:  Vitals:    11/16/22 1126   BP: (!) 144/86   BP Location: Right arm   Patient Position: Sitting   BP Method: Large (Manual)   Temp: 98.2 °F (36.8 °C)   TempSrc: Oral   Weight: 88.5 kg (195 lb)   Height: 5' 2.01" (1.575 m)       General: NAD, voice normal  Neuro: AAO, CN II - XII grossly intact  Head/ Face: NCAT, symmetric, sensations intact bilaterally. There is some subluxation of right TMJ with movement  Eyes: EOMI, PERRL  Ears: externally normal with grossly normal hearing  AD: EAC patent, TM intact, no middle ear effusion, no retractions. Able to autoinsufflate with mild difficulty  AS: EAC patent, TM intact, no middle ear effusion, no retractions. Able to autoinsufflate with mild difficulty  Nose: bilateral nares patent, midline septum, no rhinorrhea, no external deformity, no turbinate hypertrophy  OC/OP: MMM, no intraoral lesions, no trismus, dentition is poor (edentulous upper), no uvular deviation, bilaterally symmetric soft palate elevation, palatoglossus and palatopharyngeal fold wnl; tonsils are symmetric/absent. Marked pterygoid TTP R>L  Indirect laryngoscopy: deferred due to patient intolerance  Neck: tight musculature with reported tenderness to lateral/posterior palpation & movement, supple, no LAD, no thyromegaly  Respiratory: nonlabored, no wheezing, bilateral chest " rise  Cardiovascular: RRR  Gastrointestinal: S NT ND  Skin: warm, no lesions  Musculoskeletal: 5/5 strength  Psych: Appropriate affect/mood     Pertinent Data:  ? LABS:  ? AUDIO:               ? PATH:      Imaging:   I personally reviewed the following images:        Assessment/Plan:  77 y.o. female with otalgia 2/2 myofascial pain, resolved. Hx of b/l SNHL which is stable- reviewed.  - Annual audio  - RTC 1 year following audio. Ok for telemed if preferred    Nicole Upton NP

## 2023-02-07 NOTE — PROGRESS NOTES
Audiological Evaluation    Patient History:    Patient evaluated today to assess hearing.  She reportedly does not perceive any changes in hearing since the previous evaluation in 2020.   Tinnitus, otalgia, otorrhea and a history of middle ear involvement/otologic procedures have been denied at this time.  Patient's medical history has reportedly not changed since most recent medical evaluation.       Pure Tone Testing:    Right ear:       Mild to moderate, SNHL    Left ear:          Mild to moderate, SNHL        Tympanometry:      Right ear:   Type 'A' tympanogram    Left ear: Type 'A' tympanogram           Acoustic Reflex Testing    Right ear:   Did not test     Left ear: Did not test        Interpretations:    Pure tone testing revealed mild to moderate, sensorineural hearing loss, bilaterally. Speech reception thresholds were obtained at 40 dB HL consistent with pure tone results, bilaterally.  Word recognition scores were excellent, bilaterally.  Immittance testing revealed Type A tympanograms, bilaterally, indicative of normal middle ear function. Otoscopy revealed clear EACs, bilaterally.      Recommendations:     Audiological testing annually  ENT evaluation per ENT  Hearing protection when exposed to hazardous noise    Dallas Anderson.  Clinical Audiologist

## 2023-03-21 DIAGNOSIS — Z01.818 PRE-OP EVALUATION: Primary | ICD-10-CM

## 2023-03-21 NOTE — PROGRESS NOTES
"Got letter from GI who wants Cards evaluation for "clearance" prior to C-Scope 2/2 hx of palpitations and murmur. Referral placed to Cards. Pt aware and amenable.     "

## 2023-03-31 ENCOUNTER — OFFICE VISIT (OUTPATIENT)
Dept: FAMILY MEDICINE | Facility: CLINIC | Age: 78
End: 2023-03-31
Payer: MEDICARE

## 2023-03-31 ENCOUNTER — HOSPITAL ENCOUNTER (OUTPATIENT)
Dept: RADIOLOGY | Facility: HOSPITAL | Age: 78
Discharge: HOME | End: 2023-03-31
Attending: STUDENT IN AN ORGANIZED HEALTH CARE EDUCATION/TRAINING PROGRAM
Payer: MEDICARE

## 2023-03-31 VITALS
OXYGEN SATURATION: 98 % | HEIGHT: 62 IN | SYSTOLIC BLOOD PRESSURE: 143 MMHG | HEART RATE: 74 BPM | BODY MASS INDEX: 35.7 KG/M2 | RESPIRATION RATE: 17 BRPM | DIASTOLIC BLOOD PRESSURE: 78 MMHG | WEIGHT: 194 LBS | TEMPERATURE: 98 F

## 2023-03-31 DIAGNOSIS — G89.29 NECK PAIN, CHRONIC: ICD-10-CM

## 2023-03-31 DIAGNOSIS — M79.671 FOOT PAIN, RIGHT: Primary | ICD-10-CM

## 2023-03-31 DIAGNOSIS — I10 HYPERTENSION, UNSPECIFIED TYPE: ICD-10-CM

## 2023-03-31 DIAGNOSIS — F41.8 DEPRESSION WITH ANXIETY: ICD-10-CM

## 2023-03-31 DIAGNOSIS — E78.5 HYPERLIPIDEMIA, UNSPECIFIED HYPERLIPIDEMIA TYPE: ICD-10-CM

## 2023-03-31 DIAGNOSIS — M85.80 OSTEOPENIA, UNSPECIFIED LOCATION: ICD-10-CM

## 2023-03-31 DIAGNOSIS — M54.2 NECK PAIN, CHRONIC: ICD-10-CM

## 2023-03-31 DIAGNOSIS — K21.9 GASTROESOPHAGEAL REFLUX DISEASE, UNSPECIFIED WHETHER ESOPHAGITIS PRESENT: ICD-10-CM

## 2023-03-31 DIAGNOSIS — L71.9 ROSACEA: ICD-10-CM

## 2023-03-31 DIAGNOSIS — R19.5 POSITIVE FIT (FECAL IMMUNOCHEMICAL TEST): ICD-10-CM

## 2023-03-31 DIAGNOSIS — M79.671 FOOT PAIN, RIGHT: ICD-10-CM

## 2023-03-31 LAB
ALBUMIN SERPL-MCNC: 4.2 G/DL (ref 3.4–4.8)
ALBUMIN/GLOB SERPL: 1.4 RATIO (ref 1.1–2)
ALP SERPL-CCNC: 80 UNIT/L (ref 40–150)
ALT SERPL-CCNC: 21 UNIT/L (ref 0–55)
AST SERPL-CCNC: 22 UNIT/L (ref 5–34)
BILIRUBIN DIRECT+TOT PNL SERPL-MCNC: 1 MG/DL
BUN SERPL-MCNC: 18.9 MG/DL (ref 9.8–20.1)
CALCIUM SERPL-MCNC: 9.5 MG/DL (ref 8.4–10.2)
CHLORIDE SERPL-SCNC: 110 MMOL/L (ref 98–107)
CHOLEST SERPL-MCNC: 178 MG/DL
CHOLEST/HDLC SERPL: 3 {RATIO} (ref 0–5)
CO2 SERPL-SCNC: 26 MMOL/L (ref 23–31)
CREAT SERPL-MCNC: 0.69 MG/DL (ref 0.55–1.02)
GFR SERPLBLD CREATININE-BSD FMLA CKD-EPI: >60 MLS/MIN/1.73/M2
GLOBULIN SER-MCNC: 3.1 GM/DL (ref 2.4–3.5)
GLUCOSE SERPL-MCNC: 95 MG/DL (ref 82–115)
HDLC SERPL-MCNC: 59 MG/DL (ref 35–60)
LDLC SERPL CALC-MCNC: 98 MG/DL (ref 50–140)
POTASSIUM SERPL-SCNC: 3.8 MMOL/L (ref 3.5–5.1)
PROT SERPL-MCNC: 7.3 GM/DL (ref 5.8–7.6)
SODIUM SERPL-SCNC: 143 MMOL/L (ref 136–145)
TRIGL SERPL-MCNC: 105 MG/DL (ref 37–140)
VLDLC SERPL CALC-MCNC: 21 MG/DL

## 2023-03-31 PROCEDURE — 80061 LIPID PANEL: CPT

## 2023-03-31 PROCEDURE — 99214 OFFICE O/P EST MOD 30 MIN: CPT | Mod: PBBFAC

## 2023-03-31 PROCEDURE — 36415 COLL VENOUS BLD VENIPUNCTURE: CPT

## 2023-03-31 PROCEDURE — 80053 COMPREHEN METABOLIC PANEL: CPT

## 2023-03-31 PROCEDURE — 73630 X-RAY EXAM OF FOOT: CPT | Mod: TC,RT

## 2023-03-31 RX ORDER — METRONIDAZOLE 7.5 MG/G
GEL TOPICAL 2 TIMES DAILY
Qty: 45 G | Refills: 1 | Status: SHIPPED | OUTPATIENT
Start: 2023-03-31 | End: 2023-07-24 | Stop reason: SDUPTHER

## 2023-03-31 RX ORDER — METRONIDAZOLE 7.5 MG/G
GEL TOPICAL 2 TIMES DAILY
Qty: 45 G | Refills: 1 | Status: SHIPPED | OUTPATIENT
Start: 2023-03-31 | End: 2023-03-31 | Stop reason: SDUPTHER

## 2023-03-31 RX ORDER — FAMOTIDINE 20 MG/1
20 TABLET, FILM COATED ORAL 2 TIMES DAILY
Qty: 180 TABLET | Refills: 0 | Status: SHIPPED | OUTPATIENT
Start: 2023-03-31 | End: 2023-07-24 | Stop reason: SDUPTHER

## 2023-03-31 NOTE — PROGRESS NOTES
[facility] Visit Note  \[facility] [place] [facility]\"" Family Medicine      Subjective:      Nu Denny is a 78 y.o. female who is presenting for routine follow up. Complaining or R foot pain s/p trauma.     Foot Pain: Fell approx 3 weeks ago, was wearing house slippers outside and they folded over, so she tripped and fell forward. Denies hitting head or LOC.  Initally had knee and shin pain w/ overlying bruising and contusion, which has now resolved.  Now has dull/achy medial foot pain, which has mildly improved but persists. Denies any ankle, hip or back pain.. Able to ambulate since incident, but sometimes limps 2/2 pain. Pain worse w/ prolonged standing or activity. Using PRN Tylenol, 500 mg BID. Not using any Ice/Heat tx. Doesn't wear supportive shoes.     HTN: Checks BP at home, Typically in 140s/70s-80s. Denies CP, palpitations, light-headedness/dizziness, motor/sensory changes,  edema, HA (beyond when has neck pain), or tinnitus (does have chronic hearing loss & occasional ear pain).     Chronic Neck Pain: Occurs off an on, associated w/ tension in neck region and occasional occipital HA. Denies pain, weakness or numbness/tingling in bilat upper extremity.  Improved w/ Ice/Heat and PRN Tylenol. Doesn't want any further imaging or medication at this time. Doesn't want to try physical therapy at this time, is doing home HEP tx.     Depression/Anxiety: Mood stable. Denies sig anxiety. Denies any recent stressors. Pt has good support via family and friends. Doing well w/ Zoloft 50 mg qd. Eating well and sleeping well.      GERD: Well controlled w/ Pepcid 20 mg BID and diet/lifestyle modifications (cut back on fried and spicy foods) .     Osteopenia: Taking Vitamin D, Calcium and Prolia 2/2 elevated Frax Score. Pt w/ fall a couple of weeks ago.     Rosacea: Well controlled w/ PRN Metronidazole Gel. Needs refill.     Health Care Maintenance:     Breast: Pool Foley (10/2022)   Cervix: s/p total hysterectomy, not 2/2 malignancy.    Lung: Never smoker.   Bone : Osteopenia w/ Elveated Frax. Due for Prolia dose # 2 in June 2023. Pt aware.   Colon: + Fit testing. Sent for C-Scope to [facility], but needs Cards clearance first. Referral placed to [facility] cardiology approx 1 week ago.   Denies any abdominal pain, melena, worsening of constipation, diarrhea, or BRBPR.   Immunizations: UTD.     Past Medical History:   Diagnosis Date    Anxiety 06/13/2022    Cataract of left eye 06/13/2022    Central retinal vein occlusion of right eye 06/13/2022    Female bladder prolapse 06/13/2022    Gastroesophageal reflux disease 06/13/2022    Hyperlipidemia 06/13/2022    Hypertension 06/13/2022    Mild CAD     Osteopenia of neck of femur 06/13/2022    Urinary incontinence 06/13/2022    Zenker diverticulum        Past Surgical History:   Procedure Laterality Date    CHOLECYSTECTOMY      COLONOSCOPY  2017    HYSTERECTOMY      KNEE SURGERY Right     x2; states MD wanted to do replacement but pt declined    SHOULDER SURGERY  1995    TONSILLECTOMY      VITRECTOMY         Family History   Problem Relation Age of Onset    Hypertension Mother     No Known Problems Father     No Known Problems Sister     No Known Problems Brother     No Known Problems Maternal Aunt     No Known Problems Maternal Uncle     No Known Problems Paternal Aunt     No Known Problems Paternal Uncle     Diabetes Maternal Grandmother     No Known Problems Maternal Grandfather     No Known Problems Paternal Grandmother     No Known Problems Paternal Grandfather     Amblyopia Neg Hx     Blindness Neg Hx     Cancer Neg Hx     Cataracts Neg Hx     Glaucoma Neg Hx     Macular degeneration Neg Hx     Retinal detachment Neg Hx     Strabismus Neg Hx     Stroke Neg Hx     Thyroid disease Neg Hx        Social History     Socioeconomic History    Marital status:    Tobacco Use    Smoking status: Never    Smokeless tobacco: Never   Substance and Sexual Activity    Alcohol use: Never    Drug use:  "Never       Current Outpatient Medications   Medication Sig Dispense Refill    aspirin (ECOTRIN) 81 MG EC tablet Take 81 mg by mouth once daily.      atorvastatin (LIPITOR) 20 MG tablet Take 1 tablet (20 mg total) by mouth once daily. 90 tablet 0    b complex vitamins capsule Take 1 capsule by mouth once daily.      calcium carbonate (OS-SREEDHAR) 500 mg calcium (1,250 mg) chewable tablet Take 1 tablet by mouth once daily.      diclofenac sodium (VOLTAREN) 1 % Gel Apply 2 g topically 4 (four) times daily. PRN for pain 100 g 0    docusate sodium (COLACE) 100 MG capsule Take 100 mg by mouth once daily at 6am.      famotidine (PEPCID) 20 MG tablet Take 1 tablet (20 mg total) by mouth 2 (two) times daily. 180 tablet 0    losartan (COZAAR) 50 MG tablet Take 1 tablet (50 mg total) by mouth once daily. 90 tablet 0    methylcellulose oral powder Take 3.4 g by mouth once daily.      metroNIDAZOLE (METROGEL) 0.75 % gel Apply topically 2 (two) times daily. Apply to face PRN for rosacea related rash 45 g 1    multivitamin capsule Take 1 capsule by mouth once daily.      sertraline (ZOLOFT) 50 MG tablet Take 1 tablet (50 mg total) by mouth once daily. 90 tablet 0    vitamin D (VITAMIN D3) 1000 units Tab Take 1,000 Units by mouth once daily.       No current facility-administered medications for this visit.       Review of patient's allergies indicates:   Allergen Reactions    Codeine Rash       Objective:   BP (!) 143/78 (BP Location: Left arm, Patient Position: Sitting, BP Method: Large (Automatic))   Pulse 74   Temp 97.8 °F (36.6 °C) (Oral)   Resp 17   Ht 5' 2.01" (1.575 m)   Wt 88 kg (194 lb)   SpO2 98%   BMI 35.47 kg/m²      Physical Exam   HENT:   Head: Normocephalic and atraumatic.   Mouth/Throat: Mucous membranes are moist. Oropharynx is clear.   Eyes: Pupils are equal, round, and reactive to light.   Cardiovascular: Normal rate, regular rhythm and normal pulses.   Systolic murmur, II/VI  Pulmonary:      Effort: " Pulmonary effort is normal.     Abdominal: Soft. Bowel sounds are normal. She exhibits no distension and no mass.   Musculoskeletal:         General: Normal range of motion.      Cervical back: Normal range of motion and neck supple. No rigidity.      Right lower leg: No edema.      Left lower leg: No edema.      Comments: Tenderness over 1st cuneiform region and proximal 1st metatarsal. No overlying skin changes noted. Full passive and active ROM at ankle. No instability noted and no pain reproduced w/ movement..  No knee pain to palpation and full ROM. No leg bruising or overlying skin changes. No tenderness of palpation to leg. Full ROM at  hip. No tenderness to palpation, C, T, L spine. Gait normal.    Neurological: She is alert.   Skin: Skin is warm. Capillary refill takes less than 2 seconds.   Psychiatric: Mood normal.      Assessment:   78 y.o. with        1. Foot pain, right    2. Positive FIT (fecal immunochemical test)    3. Hypertension, unspecified type    4. Hyperlipidemia, unspecified hyperlipidemia type    5. Gastroesophageal reflux disease, unspecified whether esophagitis present    6. Osteopenia, unspecified location    7. Depression with anxiety    8. Neck pain, chronic    9. Rosacea           Plan:     Orders Placed This Encounter    X-Ray Foot Complete Right    Lipid Panel    Comprehensive Metabolic Panel    famotidine (PEPCID) 20 MG tablet    metroNIDAZOLE (METROGEL) 0.75 % gel   -Will order X-Ray of foot. Continue conservative management w/ Ice/Heat, Activity Modifications, Supportive Shoes, Tylenol 1000 mg TID.   -Needs C-Scope 2/2 + Fit. Pt denies any GI  issues currently. Cards clearance requested 2/2 hx of murmur and palpitations, ECHO w/ grade I diastolic dysfunction but no sig issues and 48 hour Holter WNL. Referral sent for Cards. RTC/ED precautions given for weight loss, abdominal pain, bowel changes, melena , BRBPR.   -Will continue to monitor neck pain w/ occipital HA. Conservative  management at this time, no further imaging or tx per pt choice. RTC/ED precautions given.   -Osteopenia, will need Prolia dose # 2 at next visit in 6/2023. Pt aware. Continue Vitamin D and Calcium supplementation.   -Continue Losartan 50 mg qd. BP at goal for age. CMP ordered to monitor renal funciton.   -Continue Atorvastatin 20 mg and ASA 81 mg.  Pt w/o any myalgias.  Lipid panel ordered for monitoring.   -Continue Pepcid 20 mg BID and lifestyle/diet modifications.   -Continue PRN Metorgel for rosacea, refill given today per request.     The patient's diagnosis and medications were discussed.      Karly Aragon MD   [facility] Family Medicine PGY-III  03/31/2023

## 2023-04-21 ENCOUNTER — OFFICE VISIT (OUTPATIENT)
Dept: CARDIOLOGY | Facility: CLINIC | Age: 78
End: 2023-04-21
Payer: MEDICARE

## 2023-04-21 VITALS
DIASTOLIC BLOOD PRESSURE: 86 MMHG | RESPIRATION RATE: 18 BRPM | OXYGEN SATURATION: 99 % | BODY MASS INDEX: 37.57 KG/M2 | SYSTOLIC BLOOD PRESSURE: 160 MMHG | HEART RATE: 71 BPM | HEIGHT: 60 IN | TEMPERATURE: 98 F | WEIGHT: 191.38 LBS

## 2023-04-21 DIAGNOSIS — Z01.818 PRE-OP EVALUATION: ICD-10-CM

## 2023-04-21 DIAGNOSIS — K21.9 GASTROESOPHAGEAL REFLUX DISEASE, UNSPECIFIED WHETHER ESOPHAGITIS PRESENT: ICD-10-CM

## 2023-04-21 DIAGNOSIS — F41.9 ANXIETY: Primary | ICD-10-CM

## 2023-04-21 DIAGNOSIS — I10 PRIMARY HYPERTENSION: ICD-10-CM

## 2023-04-21 DIAGNOSIS — E78.2 MIXED HYPERLIPIDEMIA: ICD-10-CM

## 2023-04-21 DIAGNOSIS — R07.9 CHEST PAIN, UNSPECIFIED TYPE: ICD-10-CM

## 2023-04-21 PROCEDURE — 99214 OFFICE O/P EST MOD 30 MIN: CPT | Mod: PBBFAC | Performed by: INTERNAL MEDICINE

## 2023-04-21 RX ORDER — DEXTROMETHORPHAN HYDROBROMIDE, GUAIFENESIN 5; 100 MG/5ML; MG/5ML
650 LIQUID ORAL EVERY 8 HOURS
COMMUNITY

## 2023-04-21 RX ORDER — CETIRIZINE HYDROCHLORIDE 10 MG/1
10 TABLET, CHEWABLE ORAL DAILY
COMMUNITY

## 2023-04-21 NOTE — PROGRESS NOTES
04/21/2023 9:57 AM    Subjective:     CHIEF COMPLAINT:   Chief Complaint   Patient presents with    New referral      Here for cardiac risk assessment. Had palpitations last year, states her DR heard a murmur. C/O mid sternal chest pain, nd fatigue since 2020.                            HPI:    Ms. Nu Denny is a 78 y.o. female.    Today the patient comes for a new patient evaluation.    CP:  The patient has chest discomfort occasionally after eating.      SOB:  The patient denies shortness of breath     EDEMA:  The patient has edema.  Has ankle edema in right leg.     ORTHOPNEA:  The patient denies orthopnea.  No PND.      SYNCOPE:  The patient denies near syncope.  No syncope.   Gets lightheaded on occasion when turn over in bed.     PALPITATIONS:  The patient has no palpitations.    LEVEL OF EXERTION:  The patient does house work.  The patient does not have symptoms with this level of exertion.  The patient's level of exertion is fair.  METS:  The patient does the following activities:  vacume (3.5 METS)     DATA FOR RCRI:  The patient:   Denies upcoming interperitoneal or intrathoracic, or suprainguinal vascular surgery  + CAD.  (Had mild MI, Abnormal stress test, Angina, Use of NTG, MI on EKG)   Denies CHF  Has TIA or CVA.  Is not being treated with insulin.   Creatinine is not > 2 mg/dL   Lab Results   Component Value Date    CREATININE 0.69 03/31/2023     The patient's RCRI is 2.    Past Medical History    Patient Active Problem List   Diagnosis    Hypertension    Hyperlipidemia    Anxiety    Gastroesophageal reflux disease    Osteopenia of neck of femur    Central retinal vein occlusion of right eye    Cataract of left eye    Urinary incontinence    Female bladder prolapse    Herpes zoster oticus    Pre-op evaluation       Surgical History    Past Surgical History:   Procedure Laterality Date    CHOLECYSTECTOMY      COLONOSCOPY  2017    HYSTERECTOMY      KNEE SURGERY Right     x2; states MD wanted to  do replacement but pt declined    SHOULDER SURGERY  1995    TONSILLECTOMY      VITRECTOMY         Social History     Socioeconomic History    Marital status:    Tobacco Use    Smoking status: Never    Smokeless tobacco: Never   Substance and Sexual Activity    Alcohol use: Never    Drug use: Never       Family History   Problem Relation Age of Onset    Hypertension Mother     No Known Problems Father     No Known Problems Sister     No Known Problems Brother     No Known Problems Maternal Aunt     No Known Problems Maternal Uncle     No Known Problems Paternal Aunt     No Known Problems Paternal Uncle     Diabetes Maternal Grandmother     No Known Problems Maternal Grandfather     No Known Problems Paternal Grandmother     No Known Problems Paternal Grandfather     Amblyopia Neg Hx     Blindness Neg Hx     Cancer Neg Hx     Cataracts Neg Hx     Glaucoma Neg Hx     Macular degeneration Neg Hx     Retinal detachment Neg Hx     Strabismus Neg Hx     Stroke Neg Hx     Thyroid disease Neg Hx      Review of patient's allergies indicates:   Allergen Reactions    Codeine Rash       Current Medications    Current Outpatient Medications   Medication Instructions    acetaminophen (TYLENOL) 650 mg, Oral, Every 8 hours    APPLE CIDER VINEGAR ORAL Oral    aspirin (ECOTRIN) 81 mg, Oral, Daily    atorvastatin (LIPITOR) 20 mg, Oral, Daily    b complex vitamins capsule 1 capsule, Oral, Daily    calcium carbonate (OS-SREEDHAR) 500 mg calcium (1,250 mg) chewable tablet 1 tablet, Oral, Daily    cetirizine 10 mg, Oral, Daily    diclofenac sodium (VOLTAREN) 2 g, Topical (Top), 4 times daily, PRN for pain    docusate sodium (COLACE) 100 mg, Oral, Daily    famotidine (PEPCID) 20 mg, Oral, 2 times daily    losartan (COZAAR) 50 mg, Oral, Daily    methylcellulose oral powder 3.4 g, Oral, Daily    metroNIDAZOLE (METROGEL) 0.75 % gel Topical (Top), 2 times daily, Apply to face PRN for rosacea related rash    multivitamin capsule 1 capsule,  "Oral, Daily    sertraline (ZOLOFT) 50 mg, Oral, Daily    vitamin D (VITAMIN D3) 1,000 Units, Oral, Daily       Cardiac medications:  aspirin, lipitor    ROS:   Please see HPI    Objective:     PE:  Blood pressure (!) 174/97, pulse 71, temperature 97.7 °F (36.5 °C), resp. rate 18, height 5' 0.24" (1.53 m), weight 86.8 kg (191 lb 6.4 oz), SpO2 99 %.     BP Readings from Last 3 Encounters:   04/21/23 (!) 174/97   03/31/23 (!) 143/78   02/07/23 138/88        Pulse Readings from Last 3 Encounters:   04/21/23 71   03/31/23 74   02/07/23 67        Temp Readings from Last 3 Encounters:   04/21/23 97.7 °F (36.5 °C)   03/31/23 97.8 °F (36.6 °C) (Oral)   02/07/23 97.5 °F (36.4 °C) (Oral)       Wt Readings from Last 3 Encounters:   04/21/23 86.8 kg (191 lb 6.4 oz)   03/31/23 88 kg (194 lb)   02/07/23 87.1 kg (192 lb)       BMI 37.09 kg/m^2    GENERAL:  Ambulates  CONSTITUTIONAL:  No acute distress.  Not ill appearing.  NECK:  No cervical adenopathy.  No carotid bruit.  CARDIOVASCULAR:  Normal rate.  Regular rhythm.  No murmur.  PULMONARY:  No respiratory distress.  No wheezing.  No crackles.  ABDOMINAL:  No distention.  No tenderness.  MUSCULOSKELETAL:  No deformity.  No edema  SKIN:  No bruising.  No rash.  NEUROLOGICAL:  Oriented x 3.  No weakness.                                                                                                                                                                                                                                                                                                                                                                                                                                                                               CARDIAC TESTING:  Echocardiogram  Results for orders placed during the hospital encounter of 01/24/23    Echo    Interpretation Summary  · The left ventricle is normal in size with concentric remodeling and normal systolic " function.  · The estimated ejection fraction is 65%.  · Grade I left ventricular diastolic dysfunction.  · Normal right ventricular size with normal right ventricular systolic function.  · There is no pulmonary hypertension.  · The estimated PA systolic pressure is 25 mmHg.  · Normal central venous pressure (3 mmHg).      Stress Test  No results found for this or any previous visit.      Coronary Angiogram  No results found for this or any previous visit.     Last BMP  Lab Results   Component Value Date     03/31/2023    K 3.8 03/31/2023    CO2 26 03/31/2023    BUN 18.9 03/31/2023    CREATININE 0.69 03/31/2023    CALCIUM 9.5 03/31/2023    EGFRNORACEVR >60 03/31/2023       Lab Results   Component Value Date    CREATININE 0.69 03/31/2023    CREATININE 0.78 08/06/2022    CREATININE 0.69 06/13/2022     Last CBC     Lab Results   Component Value Date    WBC 7.0 10/27/2022    HGB 12.4 10/27/2022    HCT 39.6 10/27/2022    MCV 96.1 (H) 10/27/2022     10/27/2022     Last lipids    Lab Results   Component Value Date    CHOL 178 03/31/2023    CHOL 177 07/02/2021    CHOL 160 10/03/2020    HDL 59 03/31/2023    HDL 57 07/02/2021    HDL 63 (H) 10/03/2020    LDL 98.00 03/31/2023    .00 07/02/2021    LDL 84 10/03/2020    TRIG 105 03/31/2023    TRIG 94 07/02/2021    TRIG 67 10/03/2020    TOTALCHOLEST 3 03/31/2023    TOTALCHOLEST 3 07/02/2021    TOTALCHOLEST 2.5 10/03/2020       LFT   No components found for: LFT    Assessment:     1. Pre-op evaluation  - Ambulatory referral/consult to Cardiology    2. Anxiety    3. Primary hypertension    4. Mixed hyperlipidemia    5. Gastroesophageal reflux disease, unspecified whether esophagitis present      Pre-op evaluation:  the patient's RCRI is 2.    10 Year Cardiovascular Risk:  The 10-year ASCVD risk score (Harshal ROSALES, et al., 2019) is: 50.7%    Values used to calculate the score:      Age: 78 years      Sex: Female      Is Non- : No      Diabetic:  No      Tobacco smoker: No      Systolic Blood Pressure: 174 mmHg      Is BP treated: Yes      HDL Cholesterol: 59 mg/dL      Total Cholesterol: 178 mg/dL  BMI:  Body mass index is 37.09 kg/m².  Patient has morbid obesity (BMI 35-39.9)  Tobacco:  denied  Alcohol:  none  Substance abuse:  none   Last PCP visit:  Patient does not have a PCP or has not yet seen their PCP    Plan:     Medications:  refills are done by PCP    Work up:    Stress test:  Order lexiscan nuclear stress test    Diet:  Avoid processed foods and drinks, sugar, salt, fried/greasy foods, and fast foods    Recommend 10 servings of fruits and vegetables per day    Exercise:  activities as tolerated    Nurse visit for BP check    Follow up:  4 months    Kaveh Morales MD  Cardiology Attending     Future Appointments   Date Time Provider Department Center   4/21/2023 10:00 AM Kaveh Morales MD [facility] INES [place]   6/6/2023  9:45 AM PROVIDERS, KARYN [place]   6/16/2023 10:40 AM RESIDENT 22, [facility] FAMILY MEDICINE [facility] FM RES [place]   2/7/2024  8:00 AM DAISHA Hightower [facility] ENT [place]

## 2023-04-26 DIAGNOSIS — I10 PRIMARY HYPERTENSION: ICD-10-CM

## 2023-04-26 RX ORDER — LOSARTAN POTASSIUM 50 MG/1
50 TABLET ORAL DAILY
Qty: 90 TABLET | Refills: 0 | Status: SHIPPED | OUTPATIENT
Start: 2023-04-26 | End: 2023-07-24 | Stop reason: SDUPTHER

## 2023-04-26 RX ORDER — ATORVASTATIN CALCIUM 20 MG/1
20 TABLET, FILM COATED ORAL DAILY
Qty: 90 TABLET | Refills: 0 | Status: SHIPPED | OUTPATIENT
Start: 2023-04-26 | End: 2023-07-24 | Stop reason: SDUPTHER

## 2023-05-09 ENCOUNTER — OFFICE VISIT (OUTPATIENT)
Dept: OPHTHALMOLOGY | Facility: CLINIC | Age: 78
End: 2023-05-09
Payer: MEDICARE

## 2023-05-09 VITALS — BODY MASS INDEX: 37.5 KG/M2 | HEIGHT: 60 IN | WEIGHT: 191 LBS

## 2023-05-09 DIAGNOSIS — H18.891 CORNEAL IRRITATION OF RIGHT EYE: Primary | ICD-10-CM

## 2023-05-09 PROCEDURE — 99213 OFFICE O/P EST LOW 20 MIN: CPT | Mod: PBBFAC,PO | Performed by: STUDENT IN AN ORGANIZED HEALTH CARE EDUCATION/TRAINING PROGRAM

## 2023-05-09 RX ORDER — ERYTHROMYCIN 5 MG/G
OINTMENT OPHTHALMIC 2 TIMES DAILY
Qty: 3.5 G | Refills: 0 | Status: SHIPPED | OUTPATIENT
Start: 2023-05-09 | End: 2023-07-24

## 2023-05-09 NOTE — PROGRESS NOTES
Assessment /Plan     For exam results, see Encounter Report.    Corneal irritation of right eye        OCT RNFL 7/1/22  OD: 9/10, 105um, all white/green  OS: 8/10, 86um, all green     OCT Mac 7/1/22  OD: 539um, large cystic IRF  OS: 322um, stable ERM, no IRF/SRF    OCT MAC 11/15/22  OD:  421 generalized edema focused nasally; grossly stable from prior but improved form 4/2022 and prior  OS:  326, stable ERM, no IRF/SRF   2/2/23 488 stable edema //  tr erm, stable     IVFA 11/30/22: overexposed image ou, prp scars and expected disc staining. No leakage ou     ==================        Right eye irritation   - woke up Saturday felt like it was scratched  - vision stable, starting to feel a bit better  - PEEs on exam, no abrasion/dendrites, no conj injury, no injection, flipped lids with forniceal sweep no fb found  - start Ats Qid, erythromycin moisés morning and night for next 3-5 days  - rtc prn but if in 1 week not feeling better, worsening pain/redness/vision pt instructed to come back for appt     Below not addressed today:       1) CRVO OD with CME  - Unclear timing although documented 10/2019  - IVFA unable to be done due to poor venous access  - Carotid showed <50% stenosis OU.  INJECTION LOG  - Avastin (10/2019)- no response, ARYA 2 (7/14/2020) - poor response  - Ozurdex (12/4/2019) - worked great previously  - Eylea 3/3 (8/19/2020, 9/17/2020, 10/16/20  - Plan most of 2020 was to proceed with ozurdex injection if no improvement in mac edema. After discussion with Dr. Greer, does not think ozurdex would provide significant benefit over triessence. Also, there is concern that vein occlusion has worsened given lack of improvement and significant worsening of edema and vision over past 5 months at end of 2020.  - Patient elected to stop injections in 2020 given she has not had significant vision changes with injections  - Exam 1/31/22 no NVI or NVA on SLE and gonio. New NVD & frond of NVE  inferotemporally.  - OCT mac 1/31/22 shows large amount of CME, ARYA OD given 1/31/22, treating for NVD and NVE to prevent NVG  - PRP OD 2/16/22, with fill in 03/16/22, 7/20/22  - 4/22: without NVI and NVA, NV looks to be fully regressed or close to it, PRP only 4 weeks out, will bring back in 1 month for eval for DFE/OCT mac- pt may need more PRP vs ARYA vs observation  - 7/1/22: OD NVD with diffuse macular DBH.   - 7/20/22 for PRP OD fill  - 11/15/22; no NVI or NVA; odd NVD vs collaterals noted OD.  Needs IVFA prior to continued injections or laser.  IOP and vision stable  - 11/30/22; no nvi/nva, no nvd or nve. Ivfa performed with overexposed image but no leakage ou. Will start spacing out visits as long as she is stable  - 2/2/23 doing well. Stable edema and vision. No NVI. Continue to space out visits.    2) Age-related nuclear cataract, left eye  - High threshold for surgery given functionally monocular, cleared by [facility] for surgery  - Last MRX on 1/31/22  - Monitor    3) ERM, OU  - H/o ERM OD s/p 25g PPV, ERM/ILM peel with FAx 5/23/17. Stable  - H/o ERM OS, NVS, OCT mac 7/1/22 stable  - Monitor    4) Pseudophakia of right eye  - Done 4/24/12 by Dr. Little, SN60WF 18.5D  - Monitor    5) Posterior vitreous detachment, left eye  - No holes/tears/breaks on DFE  - RD precautions    6) Dry eye syndrome of both lacrimal glands  - AT 4-6x/day as needed, lid hygiene  - No complaints today    RTC 4 months dfe, oct mac AS

## 2023-05-09 NOTE — PATIENT INSTRUCTIONS
Use erythromycin ointment in right eye morning and night for next 5 days  Use artificial tears in both eyes 4x daily     Return to clinic sooner if increasing redness, pain, or decreasing vision

## 2023-05-11 ENCOUNTER — CLINICAL SUPPORT (OUTPATIENT)
Dept: CARDIOLOGY | Facility: CLINIC | Age: 78
End: 2023-05-11
Payer: MEDICARE

## 2023-05-11 VITALS
RESPIRATION RATE: 17 BRPM | HEIGHT: 60 IN | DIASTOLIC BLOOD PRESSURE: 78 MMHG | BODY MASS INDEX: 38.02 KG/M2 | OXYGEN SATURATION: 97 % | HEART RATE: 69 BPM | SYSTOLIC BLOOD PRESSURE: 162 MMHG | WEIGHT: 193.63 LBS

## 2023-05-11 PROCEDURE — 99214 OFFICE O/P EST MOD 30 MIN: CPT | Mod: PBBFAC

## 2023-05-11 NOTE — PROGRESS NOTES
Pt presents today for BP check, she brought home cuff and BP log. Pt reports compliance with meds, no issues to report. BP elevated in clinic today, pt asymptomatic.    Right arm:  [employer] monitor - 186/67  67  Pt's home cuff- 174/86  68  Manual - 170/82    Left arm:   [employer] monitor- 175/75  67  Pt's home cuff- 164/90 68  Manual - 162/80    Case presented to Dr. Morales , no medication changes today as pt's home BP log reveals all BP WNL. See copy in . Pt given instructions to continue current regimen, she verbalizes understanding.

## 2023-05-19 ENCOUNTER — HOSPITAL ENCOUNTER (OUTPATIENT)
Dept: RADIOLOGY | Facility: HOSPITAL | Age: 78
Discharge: HOME | End: 2023-05-19
Attending: INTERNAL MEDICINE
Payer: MEDICARE

## 2023-05-19 ENCOUNTER — HOSPITAL ENCOUNTER (OUTPATIENT)
Dept: CARDIOLOGY | Facility: HOSPITAL | Age: 78
Discharge: HOME | End: 2023-05-19
Attending: INTERNAL MEDICINE
Payer: MEDICARE

## 2023-05-19 VITALS
BODY MASS INDEX: 38 KG/M2 | HEIGHT: 60 IN | RESPIRATION RATE: 20 BRPM | SYSTOLIC BLOOD PRESSURE: 146 MMHG | WEIGHT: 193.56 LBS | DIASTOLIC BLOOD PRESSURE: 81 MMHG | HEART RATE: 70 BPM

## 2023-05-19 DIAGNOSIS — E78.2 MIXED HYPERLIPIDEMIA: ICD-10-CM

## 2023-05-19 DIAGNOSIS — Z01.818 PRE-OP EVALUATION: ICD-10-CM

## 2023-05-19 DIAGNOSIS — R07.9 CHEST PAIN, UNSPECIFIED TYPE: ICD-10-CM

## 2023-05-19 LAB
CV STRESS BASE HR: 70 BPM
DIASTOLIC BLOOD PRESSURE: 81 MMHG
NUC REST EJECTION FRACTION: 70
NUC STRESS EJECTION FRACTION: 80 %
OHS CV CPX 85 PERCENT MAX PREDICTED HEART RATE MALE: 116
OHS CV CPX ESTIMATED METS: 1
OHS CV CPX MAX PREDICTED HEART RATE: 136
OHS CV CPX PATIENT IS FEMALE: 1
OHS CV CPX PATIENT IS MALE: 0
OHS CV CPX PEAK DIASTOLIC BLOOD PRESSURE: 84 MMHG
OHS CV CPX PEAK HEAR RATE: 104 BPM
OHS CV CPX PEAK RATE PRESSURE PRODUCT: NORMAL
OHS CV CPX PEAK SYSTOLIC BLOOD PRESSURE: 162 MMHG
OHS CV CPX PERCENT MAX PREDICTED HEART RATE ACHIEVED: 76
OHS CV CPX RATE PRESSURE PRODUCT PRESENTING: NORMAL
STRESS ECHO POST EXERCISE DUR MIN: 4 MINUTES
STRESS ECHO POST EXERCISE DUR SEC: 34 SECONDS
SYSTOLIC BLOOD PRESSURE: 146 MMHG

## 2023-05-19 PROCEDURE — 93017 CV STRESS TEST TRACING ONLY: CPT

## 2023-05-19 PROCEDURE — A9502 TC99M TETROFOSMIN: HCPCS

## 2023-05-19 PROCEDURE — 78452 HT MUSCLE IMAGE SPECT MULT: CPT

## 2023-05-19 PROCEDURE — 63600175 PHARM REV CODE 636 W HCPCS: Performed by: INTERNAL MEDICINE

## 2023-05-19 RX ORDER — REGADENOSON 0.08 MG/ML
0.4 INJECTION, SOLUTION INTRAVENOUS
Status: COMPLETED | OUTPATIENT
Start: 2023-05-19 | End: 2023-05-19

## 2023-05-19 RX ADMIN — REGADENOSON 0.4 MG: 0.08 INJECTION, SOLUTION INTRAVENOUS at 07:05

## 2023-05-30 ENCOUNTER — OFFICE VISIT (OUTPATIENT)
Dept: CARDIOLOGY | Facility: CLINIC | Age: 78
End: 2023-05-30
Payer: MEDICARE

## 2023-05-30 VITALS
HEART RATE: 67 BPM | HEIGHT: 60 IN | OXYGEN SATURATION: 97 % | RESPIRATION RATE: 20 BRPM | SYSTOLIC BLOOD PRESSURE: 150 MMHG | WEIGHT: 191.63 LBS | TEMPERATURE: 97 F | DIASTOLIC BLOOD PRESSURE: 88 MMHG | BODY MASS INDEX: 37.62 KG/M2

## 2023-05-30 DIAGNOSIS — R94.39 ABNORMAL STRESS TEST: Primary | ICD-10-CM

## 2023-05-30 PROCEDURE — 99215 OFFICE O/P EST HI 40 MIN: CPT | Mod: PBBFAC | Performed by: INTERNAL MEDICINE

## 2023-05-30 RX ORDER — SODIUM CHLORIDE 0.9 % (FLUSH) 0.9 %
10 SYRINGE (ML) INJECTION
Status: SHIPPED | OUTPATIENT
Start: 2023-05-30

## 2023-05-30 RX ORDER — SODIUM CHLORIDE 9 MG/ML
INJECTION, SOLUTION INTRAVENOUS ONCE
Status: CANCELLED | OUTPATIENT
Start: 2023-05-30 | End: 2023-05-30

## 2023-05-30 NOTE — PROGRESS NOTES
05/30/2023 9:57 AM    Subjective:     CHIEF COMPLAINT:   Chief Complaint   Patient presents with    f/u denies chest pain or sob                           HPI:    Ms. Nu Denny is a 78 y.o. female with PMH of HTN, GERD, osteopenia, chronic neck pain who presents to cardiology clinic for f/u s/p positive stress test.      CP:  The patient denies any episodes of chest pain     SOB:  The patient denies shortness of breath     EDEMA:  The patient has edema.  Has ankle edema in right leg.     ORTHOPNEA:  The patient denies orthopnea.  No PND.      SYNCOPE:  The patient denies near syncope.  No syncope.   Gets lightheaded on occasion when turn over in bed.     PALPITATIONS:  The patient has no palpitations.    LEVEL OF EXERTION:  The patient does house work.  The patient does not have symptoms with this level of exertion.  The patient's level of exertion is fair.  METS:  The patient does the following activities:  vacume (3.5 METS)     DATA FOR RCRI:  The patient:   Denies upcoming interperitoneal or intrathoracic, or suprainguinal vascular surgery  + abnormal stress test 5/19/2023 w/ moderate intensity, small to moderate sized, reversible perfusion abnormality that is consistent with ischemia in the distal to apical apical wall(s) in the typical distribution of the LAD territory  Denies CHF  Has TIA or CVA.  Is not being treated with insulin.   Creatinine is not > 2 mg/dL   Lab Results   Component Value Date    CREATININE 0.69 03/31/2023     The patient's RCRI is 1.    Past Medical History    Patient Active Problem List   Diagnosis    Hypertension    Hyperlipidemia    Anxiety    Gastroesophageal reflux disease    Osteopenia of neck of femur    Central retinal vein occlusion of right eye    Cataract of left eye    Urinary incontinence    Female bladder prolapse    Herpes zoster oticus    Pre-op evaluation       Surgical History    Past Surgical History:   Procedure Laterality Date    CHOLECYSTECTOMY       COLONOSCOPY  2017    HYSTERECTOMY      KNEE SURGERY Right     x2; states MD wanted to do replacement but pt declined    SHOULDER SURGERY  1995    TONSILLECTOMY      VITRECTOMY         Social History     Socioeconomic History    Marital status:    Tobacco Use    Smoking status: Never    Smokeless tobacco: Never   Substance and Sexual Activity    Alcohol use: Never    Drug use: Never       Family History   Problem Relation Age of Onset    Hypertension Mother     No Known Problems Father     No Known Problems Sister     No Known Problems Brother     No Known Problems Maternal Aunt     No Known Problems Maternal Uncle     No Known Problems Paternal Aunt     No Known Problems Paternal Uncle     Diabetes Maternal Grandmother     No Known Problems Maternal Grandfather     No Known Problems Paternal Grandmother     No Known Problems Paternal Grandfather     Amblyopia Neg Hx     Blindness Neg Hx     Cancer Neg Hx     Cataracts Neg Hx     Glaucoma Neg Hx     Macular degeneration Neg Hx     Retinal detachment Neg Hx     Strabismus Neg Hx     Stroke Neg Hx     Thyroid disease Neg Hx      Review of patient's allergies indicates:   Allergen Reactions    Codeine Rash       Current Medications    Current Outpatient Medications   Medication Instructions    acetaminophen (TYLENOL) 650 mg, Oral, Every 8 hours    APPLE CIDER VINEGAR ORAL Oral    aspirin (ECOTRIN) 81 mg, Oral, Daily    atorvastatin (LIPITOR) 20 mg, Oral, Daily    b complex vitamins capsule 1 capsule, Oral, Daily    calcium carbonate (OS-SREEDHAR) 500 mg calcium (1,250 mg) chewable tablet 1 tablet, Oral, Daily    cetirizine 10 mg, Oral, Daily    diclofenac sodium (VOLTAREN) 2 g, Topical (Top), 4 times daily, PRN for pain    docusate sodium (COLACE) 100 mg, Oral, Daily    erythromycin (ROMYCIN) ophthalmic ointment Right Eye, 2 times daily, Use in morning and before bed    famotidine (PEPCID) 20 mg, Oral, 2 times daily    losartan (COZAAR) 50 mg, Oral, Daily     methylcellulose oral powder 3.4 g, Oral, Daily    metroNIDAZOLE (METROGEL) 0.75 % gel Topical (Top), 2 times daily, Apply to face PRN for rosacea related rash    multivitamin capsule 1 capsule, Oral, Daily    sertraline (ZOLOFT) 50 mg, Oral, Daily    vitamin D (VITAMIN D3) 1,000 Units, Oral, Daily       Cardiac medications:  aspirin, lipitor, Losartan     ROS:   Please see HPI    Objective:     PE:  Blood pressure (!) 180/80, pulse 67, temperature 97.3 °F (36.3 °C), temperature source Oral, resp. rate 20, height 5' (1.524 m), weight 86.9 kg (191 lb 9.6 oz), SpO2 97 %.     BP Readings from Last 3 Encounters:   05/30/23 (!) 180/80   05/19/23 (!) 146/81   05/11/23 (!) 162/78        Pulse Readings from Last 3 Encounters:   05/30/23 67   05/19/23 70   05/11/23 69        Temp Readings from Last 3 Encounters:   05/30/23 97.3 °F (36.3 °C) (Oral)   04/21/23 97.7 °F (36.5 °C)   03/31/23 97.8 °F (36.6 °C) (Oral)       Wt Readings from Last 3 Encounters:   05/30/23 86.9 kg (191 lb 9.6 oz)   05/19/23 87.8 kg (193 lb 9 oz)   05/11/23 87.8 kg (193 lb 9.6 oz)       BMI 37.42 kg/m^2    GENERAL:  Ambulates  CONSTITUTIONAL:  No acute distress.  Not ill appearing.  NECK:  No cervical adenopathy.  No carotid bruit.  CARDIOVASCULAR:  Normal rate.  Regular rhythm.  No murmur.  PULMONARY:  No respiratory distress.  No wheezing.  No crackles.  ABDOMINAL:  No distention.  No tenderness.  MUSCULOSKELETAL:  No deformity.  No edema  SKIN:  No bruising.  No rash.  NEUROLOGICAL:  Oriented x 3.  No weakness.                                                                                                                                                                                                                                                                                                                                                                                                                                                                                CARDIAC TESTING:  Echocardiogram  Results for orders placed during the hospital encounter of 01/24/23    Echo    Interpretation Summary  · The left ventricle is normal in size with concentric remodeling and normal systolic function.  · The estimated ejection fraction is 65%.  · Grade I left ventricular diastolic dysfunction.  · Normal right ventricular size with normal right ventricular systolic function.  · There is no pulmonary hypertension.  · The estimated PA systolic pressure is 25 mmHg.  · Normal central venous pressure (3 mmHg).      Stress Test   Abnormal myocardial perfusion scan.    There is a moderate intensity, small to moderate sized, reversible perfusion abnormality that is consistent with ischemia in the distal to apical apical wall(s) in the typical distribution of the LAD territory.    There are no other significant perfusion abnormalities.    The gated perfusion images showed an ejection fraction of 70% at rest. The gated perfusion images showed an ejection fraction of 80% post stress.    The patient reported no chest pain during the stress test.    There were no arrhythmias during stress.      Coronary Angiogram  No results found for this or any previous visit.     Last BMP  Lab Results   Component Value Date     03/31/2023    K 3.8 03/31/2023    CO2 26 03/31/2023    BUN 18.9 03/31/2023    CREATININE 0.69 03/31/2023    CALCIUM 9.5 03/31/2023    EGFRNORACEVR >60 03/31/2023       Lab Results   Component Value Date    CREATININE 0.69 03/31/2023    CREATININE 0.78 08/06/2022    CREATININE 0.69 06/13/2022     Last CBC     Lab Results   Component Value Date    WBC 7.0 10/27/2022    HGB 12.4 10/27/2022    HCT 39.6 10/27/2022    MCV 96.1 (H) 10/27/2022     10/27/2022     Last lipids    Lab Results   Component Value Date    CHOL 178 03/31/2023    CHOL 177 07/02/2021    CHOL 160 10/03/2020    HDL 59 03/31/2023    HDL 57 07/02/2021    HDL 63 (H) 10/03/2020    LDL 98.00 03/31/2023     .00 07/02/2021    LDL 84 10/03/2020    TRIG 105 03/31/2023    TRIG 94 07/02/2021    TRIG 67 10/03/2020    TOTALCHOLEST 3 03/31/2023    TOTALCHOLEST 3 07/02/2021    TOTALCHOLEST 2.5 10/03/2020       LFT   No components found for: LFT    Assessment:     Abnormal stress test     Pre-op evaluation:  the patient's RCRI is 1.    10 Year Cardiovascular Risk:  The 10-year ASCVD risk score (Harshal ROSALES, et al., 2019) is: 53.1%    Values used to calculate the score:      Age: 78 years      Sex: Female      Is Non- : No      Diabetic: No      Tobacco smoker: No      Systolic Blood Pressure: 180 mmHg      Is BP treated: Yes      HDL Cholesterol: 59 mg/dL      Total Cholesterol: 178 mg/dL  BMI:  Body mass index is 37.42 kg/m².  Patient has morbid obesity (BMI 35-39.9)  Tobacco:  denied  Alcohol:  none  Substance abuse:  none   Last PCP visit:  Patient does not have a PCP or has not yet seen their PCP    Plan:     Medications:  refills are done by PCP    Work up:   Patient to be scheduled for coronary angiogram for further workup in setting of significantly elevated ASCVD risk >50% and positive lexiscan w/ abnormality noted in region of LAD  RCRI score 1. Class 2 risk w/ 6.0% 30-day risk of death, MI, or cardiac arrest.     Diet:  Avoid processed foods and drinks, sugar, salt, fried/greasy foods, and fast foods    Recommend 10 servings of fruits and vegetables per day    Exercise:  activities as tolerated    Follow up:  4 months    Ekaterina Moreno MD, PGY-1     Future Appointments   Date Time Provider Department Center   6/6/2023  9:45 AM PROVIDERS, KARYN [place]   6/16/2023 10:40 AM RESIDENT 22, [facility] FAMILY MEDICINE [facility] FM RES [place]   2/7/2024  8:00 AM DAISHA Hightower [facility] ENT [place]

## 2023-05-30 NOTE — PATIENT INSTRUCTIONS
Pre op - June 22 at 9am - check in on 6th floor then go to 7th    Angiogram on Monday June 26th- Time to be determined

## 2023-05-30 NOTE — PROGRESS NOTES
Cardiology Attending    I evaluated Nu Denny and discussed the patient's symptoms, findings, and management plan with the resident.  Please see the Cardiology note for details.

## 2023-05-30 NOTE — H&P (VIEW-ONLY)
05/30/2023 9:57 AM    Subjective:     CHIEF COMPLAINT:   Chief Complaint   Patient presents with    f/u denies chest pain or sob                           HPI:    Ms. Nu Denny is a 78 y.o. female with PMH of HTN, GERD, osteopenia, chronic neck pain who presents to cardiology clinic for f/u s/p positive stress test.      CP:  The patient denies any episodes of chest pain     SOB:  The patient denies shortness of breath     EDEMA:  The patient has edema.  Has ankle edema in right leg.     ORTHOPNEA:  The patient denies orthopnea.  No PND.      SYNCOPE:  The patient denies near syncope.  No syncope.   Gets lightheaded on occasion when turn over in bed.     PALPITATIONS:  The patient has no palpitations.    LEVEL OF EXERTION:  The patient does house work.  The patient does not have symptoms with this level of exertion.  The patient's level of exertion is fair.  METS:  The patient does the following activities:  vacume (3.5 METS)     DATA FOR RCRI:  The patient:   Denies upcoming interperitoneal or intrathoracic, or suprainguinal vascular surgery  + abnormal stress test 5/19/2023 w/ moderate intensity, small to moderate sized, reversible perfusion abnormality that is consistent with ischemia in the distal to apical apical wall(s) in the typical distribution of the LAD territory  Denies CHF  Has TIA or CVA.  Is not being treated with insulin.   Creatinine is not > 2 mg/dL   Lab Results   Component Value Date    CREATININE 0.69 03/31/2023     The patient's RCRI is 1.    Past Medical History    Patient Active Problem List   Diagnosis    Hypertension    Hyperlipidemia    Anxiety    Gastroesophageal reflux disease    Osteopenia of neck of femur    Central retinal vein occlusion of right eye    Cataract of left eye    Urinary incontinence    Female bladder prolapse    Herpes zoster oticus    Pre-op evaluation       Surgical History    Past Surgical History:   Procedure Laterality Date    CHOLECYSTECTOMY       COLONOSCOPY  2017    HYSTERECTOMY      KNEE SURGERY Right     x2; states MD wanted to do replacement but pt declined    SHOULDER SURGERY  1995    TONSILLECTOMY      VITRECTOMY         Social History     Socioeconomic History    Marital status:    Tobacco Use    Smoking status: Never    Smokeless tobacco: Never   Substance and Sexual Activity    Alcohol use: Never    Drug use: Never       Family History   Problem Relation Age of Onset    Hypertension Mother     No Known Problems Father     No Known Problems Sister     No Known Problems Brother     No Known Problems Maternal Aunt     No Known Problems Maternal Uncle     No Known Problems Paternal Aunt     No Known Problems Paternal Uncle     Diabetes Maternal Grandmother     No Known Problems Maternal Grandfather     No Known Problems Paternal Grandmother     No Known Problems Paternal Grandfather     Amblyopia Neg Hx     Blindness Neg Hx     Cancer Neg Hx     Cataracts Neg Hx     Glaucoma Neg Hx     Macular degeneration Neg Hx     Retinal detachment Neg Hx     Strabismus Neg Hx     Stroke Neg Hx     Thyroid disease Neg Hx      Review of patient's allergies indicates:   Allergen Reactions    Codeine Rash       Current Medications    Current Outpatient Medications   Medication Instructions    acetaminophen (TYLENOL) 650 mg, Oral, Every 8 hours    APPLE CIDER VINEGAR ORAL Oral    aspirin (ECOTRIN) 81 mg, Oral, Daily    atorvastatin (LIPITOR) 20 mg, Oral, Daily    b complex vitamins capsule 1 capsule, Oral, Daily    calcium carbonate (OS-SREEDHAR) 500 mg calcium (1,250 mg) chewable tablet 1 tablet, Oral, Daily    cetirizine 10 mg, Oral, Daily    diclofenac sodium (VOLTAREN) 2 g, Topical (Top), 4 times daily, PRN for pain    docusate sodium (COLACE) 100 mg, Oral, Daily    erythromycin (ROMYCIN) ophthalmic ointment Right Eye, 2 times daily, Use in morning and before bed    famotidine (PEPCID) 20 mg, Oral, 2 times daily    losartan (COZAAR) 50 mg, Oral, Daily     methylcellulose oral powder 3.4 g, Oral, Daily    metroNIDAZOLE (METROGEL) 0.75 % gel Topical (Top), 2 times daily, Apply to face PRN for rosacea related rash    multivitamin capsule 1 capsule, Oral, Daily    sertraline (ZOLOFT) 50 mg, Oral, Daily    vitamin D (VITAMIN D3) 1,000 Units, Oral, Daily       Cardiac medications:  aspirin, lipitor, Losartan     ROS:   Please see HPI    Objective:     PE:  Blood pressure (!) 180/80, pulse 67, temperature 97.3 °F (36.3 °C), temperature source Oral, resp. rate 20, height 5' (1.524 m), weight 86.9 kg (191 lb 9.6 oz), SpO2 97 %.     BP Readings from Last 3 Encounters:   05/30/23 (!) 180/80   05/19/23 (!) 146/81   05/11/23 (!) 162/78        Pulse Readings from Last 3 Encounters:   05/30/23 67   05/19/23 70   05/11/23 69        Temp Readings from Last 3 Encounters:   05/30/23 97.3 °F (36.3 °C) (Oral)   04/21/23 97.7 °F (36.5 °C)   03/31/23 97.8 °F (36.6 °C) (Oral)       Wt Readings from Last 3 Encounters:   05/30/23 86.9 kg (191 lb 9.6 oz)   05/19/23 87.8 kg (193 lb 9 oz)   05/11/23 87.8 kg (193 lb 9.6 oz)       BMI 37.42 kg/m^2    GENERAL:  Ambulates  CONSTITUTIONAL:  No acute distress.  Not ill appearing.  NECK:  No cervical adenopathy.  No carotid bruit.  CARDIOVASCULAR:  Normal rate.  Regular rhythm.  No murmur.  PULMONARY:  No respiratory distress.  No wheezing.  No crackles.  ABDOMINAL:  No distention.  No tenderness.  MUSCULOSKELETAL:  No deformity.  No edema  SKIN:  No bruising.  No rash.  NEUROLOGICAL:  Oriented x 3.  No weakness.                                                                                                                                                                                                                                                                                                                                                                                                                                                                                CARDIAC TESTING:  Echocardiogram  Results for orders placed during the hospital encounter of 01/24/23    Echo    Interpretation Summary  · The left ventricle is normal in size with concentric remodeling and normal systolic function.  · The estimated ejection fraction is 65%.  · Grade I left ventricular diastolic dysfunction.  · Normal right ventricular size with normal right ventricular systolic function.  · There is no pulmonary hypertension.  · The estimated PA systolic pressure is 25 mmHg.  · Normal central venous pressure (3 mmHg).      Stress Test   Abnormal myocardial perfusion scan.    There is a moderate intensity, small to moderate sized, reversible perfusion abnormality that is consistent with ischemia in the distal to apical apical wall(s) in the typical distribution of the LAD territory.    There are no other significant perfusion abnormalities.    The gated perfusion images showed an ejection fraction of 70% at rest. The gated perfusion images showed an ejection fraction of 80% post stress.    The patient reported no chest pain during the stress test.    There were no arrhythmias during stress.      Coronary Angiogram  No results found for this or any previous visit.     Last BMP  Lab Results   Component Value Date     03/31/2023    K 3.8 03/31/2023    CO2 26 03/31/2023    BUN 18.9 03/31/2023    CREATININE 0.69 03/31/2023    CALCIUM 9.5 03/31/2023    EGFRNORACEVR >60 03/31/2023       Lab Results   Component Value Date    CREATININE 0.69 03/31/2023    CREATININE 0.78 08/06/2022    CREATININE 0.69 06/13/2022     Last CBC     Lab Results   Component Value Date    WBC 7.0 10/27/2022    HGB 12.4 10/27/2022    HCT 39.6 10/27/2022    MCV 96.1 (H) 10/27/2022     10/27/2022     Last lipids    Lab Results   Component Value Date    CHOL 178 03/31/2023    CHOL 177 07/02/2021    CHOL 160 10/03/2020    HDL 59 03/31/2023    HDL 57 07/02/2021    HDL 63 (H) 10/03/2020    LDL 98.00 03/31/2023     .00 07/02/2021    LDL 84 10/03/2020    TRIG 105 03/31/2023    TRIG 94 07/02/2021    TRIG 67 10/03/2020    TOTALCHOLEST 3 03/31/2023    TOTALCHOLEST 3 07/02/2021    TOTALCHOLEST 2.5 10/03/2020       LFT   No components found for: LFT    Assessment:     Abnormal stress test     Pre-op evaluation:  the patient's RCRI is 1.    10 Year Cardiovascular Risk:  The 10-year ASCVD risk score (Harshal ROSALES, et al., 2019) is: 53.1%    Values used to calculate the score:      Age: 78 years      Sex: Female      Is Non- : No      Diabetic: No      Tobacco smoker: No      Systolic Blood Pressure: 180 mmHg      Is BP treated: Yes      HDL Cholesterol: 59 mg/dL      Total Cholesterol: 178 mg/dL  BMI:  Body mass index is 37.42 kg/m².  Patient has morbid obesity (BMI 35-39.9)  Tobacco:  denied  Alcohol:  none  Substance abuse:  none   Last PCP visit:  Patient does not have a PCP or has not yet seen their PCP    Plan:     Medications:  refills are done by PCP    Work up:   Patient to be scheduled for coronary angiogram for further workup in setting of significantly elevated ASCVD risk >50% and positive lexiscan w/ abnormality noted in region of LAD  RCRI score 1. Class 2 risk w/ 6.0% 30-day risk of death, MI, or cardiac arrest.     Diet:  Avoid processed foods and drinks, sugar, salt, fried/greasy foods, and fast foods    Recommend 10 servings of fruits and vegetables per day    Exercise:  activities as tolerated    Follow up:  4 months    Ekaterina Moreno MD, PGY-1     Future Appointments   Date Time Provider Department Center   6/6/2023  9:45 AM PROVIDERS, KARYN [place]   6/16/2023 10:40 AM RESIDENT 22, [facility] FAMILY MEDICINE [facility] FM RES [place]   2/7/2024  8:00 AM DAISHA Hightower [facility] ENT [place]

## 2023-06-22 ENCOUNTER — APPOINTMENT (OUTPATIENT)
Dept: PREADMISSION TESTING | Facility: HOSPITAL | Age: 78
End: 2023-06-22
Attending: INTERNAL MEDICINE
Payer: MEDICARE

## 2023-06-22 ENCOUNTER — CLINICAL SUPPORT (OUTPATIENT)
Dept: CARDIOLOGY | Facility: CLINIC | Age: 78
End: 2023-06-22
Payer: MEDICARE

## 2023-06-22 VITALS
DIASTOLIC BLOOD PRESSURE: 90 MMHG | SYSTOLIC BLOOD PRESSURE: 169 MMHG | BODY MASS INDEX: 37.77 KG/M2 | HEIGHT: 60 IN | OXYGEN SATURATION: 96 % | HEART RATE: 71 BPM | RESPIRATION RATE: 17 BRPM | WEIGHT: 192.38 LBS

## 2023-06-22 DIAGNOSIS — R94.39 ABNORMAL STRESS TEST: ICD-10-CM

## 2023-06-22 DIAGNOSIS — R00.2 PALPITATIONS: Primary | ICD-10-CM

## 2023-06-22 DIAGNOSIS — R07.9 CHEST PAIN, UNSPECIFIED TYPE: ICD-10-CM

## 2023-06-22 DIAGNOSIS — E78.2 MIXED HYPERLIPIDEMIA: ICD-10-CM

## 2023-06-22 LAB
ANION GAP SERPL CALC-SCNC: 7 MEQ/L
APTT PPP: 35 SECONDS
BASOPHILS # BLD AUTO: 0.04 X10(3)/MCL
BASOPHILS NFR BLD AUTO: 0.6 %
BUN SERPL-MCNC: 18.8 MG/DL (ref 9.8–20.1)
CALCIUM SERPL-MCNC: 9.5 MG/DL (ref 8.4–10.2)
CHLORIDE SERPL-SCNC: 110 MMOL/L (ref 98–107)
CO2 SERPL-SCNC: 26 MMOL/L (ref 23–31)
CREAT SERPL-MCNC: 0.73 MG/DL (ref 0.55–1.02)
CREAT/UREA NIT SERPL: 26
EOSINOPHIL # BLD AUTO: 0.18 X10(3)/MCL (ref 0–0.9)
EOSINOPHIL NFR BLD AUTO: 2.7 %
ERYTHROCYTE [DISTWIDTH] IN BLOOD BY AUTOMATED COUNT: 12.7 % (ref 11.5–17)
GFR SERPLBLD CREATININE-BSD FMLA CKD-EPI: >60 MLS/MIN/1.73/M2
GLUCOSE SERPL-MCNC: 91 MG/DL (ref 82–115)
HCT VFR BLD AUTO: 41.3 % (ref 37–47)
HGB BLD-MCNC: 13 G/DL (ref 12–16)
IMM GRANULOCYTES # BLD AUTO: 0.01 X10(3)/MCL (ref 0–0.04)
IMM GRANULOCYTES NFR BLD AUTO: 0.1 %
LYMPHOCYTES # BLD AUTO: 2.49 X10(3)/MCL (ref 0.6–4.6)
LYMPHOCYTES NFR BLD AUTO: 37 %
MCH RBC QN AUTO: 29.5 PG (ref 27–31)
MCHC RBC AUTO-ENTMCNC: 31.5 G/DL (ref 33–36)
MCV RBC AUTO: 93.9 FL (ref 80–94)
MONOCYTES # BLD AUTO: 0.48 X10(3)/MCL (ref 0.1–1.3)
MONOCYTES NFR BLD AUTO: 7.1 %
NEUTROPHILS # BLD AUTO: 3.53 X10(3)/MCL (ref 2.1–9.2)
NEUTROPHILS NFR BLD AUTO: 52.5 %
NRBC BLD AUTO-RTO: 0 %
PLATELET # BLD AUTO: 248 X10(3)/MCL (ref 130–400)
PMV BLD AUTO: 9.9 FL (ref 7.4–10.4)
POTASSIUM SERPL-SCNC: 4.5 MMOL/L (ref 3.5–5.1)
RBC # BLD AUTO: 4.4 X10(6)/MCL (ref 4.2–5.4)
SODIUM SERPL-SCNC: 143 MMOL/L (ref 136–145)
WBC # SPEC AUTO: 6.73 X10(3)/MCL (ref 4.5–11.5)

## 2023-06-22 PROCEDURE — 85610 PROTHROMBIN TIME: CPT

## 2023-06-22 PROCEDURE — 99213 OFFICE O/P EST LOW 20 MIN: CPT | Mod: PBBFAC

## 2023-06-22 PROCEDURE — 85730 THROMBOPLASTIN TIME PARTIAL: CPT

## 2023-06-22 PROCEDURE — 36415 COLL VENOUS BLD VENIPUNCTURE: CPT

## 2023-06-22 PROCEDURE — 85025 COMPLETE CBC W/AUTO DIFF WBC: CPT

## 2023-06-22 PROCEDURE — 93005 ELECTROCARDIOGRAM TRACING: CPT

## 2023-06-22 PROCEDURE — 80048 BASIC METABOLIC PNL TOTAL CA: CPT

## 2023-06-28 ENCOUNTER — HOSPITAL ENCOUNTER (OUTPATIENT)
Facility: HOSPITAL | Age: 78
Discharge: HOME | End: 2023-06-28
Attending: INTERNAL MEDICINE | Admitting: INTERNAL MEDICINE
Payer: MEDICARE

## 2023-06-28 VITALS
OXYGEN SATURATION: 95 % | BODY MASS INDEX: 36.79 KG/M2 | HEART RATE: 66 BPM | RESPIRATION RATE: 16 BRPM | SYSTOLIC BLOOD PRESSURE: 147 MMHG | HEIGHT: 60 IN | WEIGHT: 187.38 LBS | DIASTOLIC BLOOD PRESSURE: 78 MMHG | TEMPERATURE: 98 F

## 2023-06-28 DIAGNOSIS — R94.39 ABNORMAL STRESS TEST: Primary | ICD-10-CM

## 2023-06-28 PROCEDURE — 63600175 PHARM REV CODE 636 W HCPCS: Performed by: INTERNAL MEDICINE

## 2023-06-28 PROCEDURE — 99152 MOD SED SAME PHYS/QHP 5/>YRS: CPT | Performed by: INTERNAL MEDICINE

## 2023-06-28 PROCEDURE — 25000003 PHARM REV CODE 250: Performed by: INTERNAL MEDICINE

## 2023-06-28 PROCEDURE — 93458 L HRT ARTERY/VENTRICLE ANGIO: CPT | Performed by: INTERNAL MEDICINE

## 2023-06-28 PROCEDURE — C1887 CATHETER, GUIDING: HCPCS | Performed by: INTERNAL MEDICINE

## 2023-06-28 PROCEDURE — 99153 MOD SED SAME PHYS/QHP EA: CPT | Performed by: INTERNAL MEDICINE

## 2023-06-28 PROCEDURE — 25500020 PHARM REV CODE 255: Performed by: INTERNAL MEDICINE

## 2023-06-28 PROCEDURE — C1769 GUIDE WIRE: HCPCS | Performed by: INTERNAL MEDICINE

## 2023-06-28 PROCEDURE — C1894 INTRO/SHEATH, NON-LASER: HCPCS | Performed by: INTERNAL MEDICINE

## 2023-06-28 RX ORDER — MIDAZOLAM HYDROCHLORIDE 5 MG/ML
INJECTION INTRAMUSCULAR; INTRAVENOUS
Status: DISCONTINUED | OUTPATIENT
Start: 2023-06-28 | End: 2023-06-28 | Stop reason: HOSPADM

## 2023-06-28 RX ORDER — SODIUM CHLORIDE 9 MG/ML
INJECTION, SOLUTION INTRAVENOUS ONCE
Status: COMPLETED | OUTPATIENT
Start: 2023-06-28 | End: 2023-06-28

## 2023-06-28 RX ORDER — DIPHENHYDRAMINE HCL 25 MG
25 CAPSULE ORAL ONCE
Status: COMPLETED | OUTPATIENT
Start: 2023-06-28 | End: 2023-06-28

## 2023-06-28 RX ORDER — FENTANYL CITRATE 50 UG/ML
INJECTION, SOLUTION INTRAMUSCULAR; INTRAVENOUS
Status: DISCONTINUED | OUTPATIENT
Start: 2023-06-28 | End: 2023-06-28 | Stop reason: HOSPADM

## 2023-06-28 RX ORDER — ONDANSETRON 4 MG/1
8 TABLET, ORALLY DISINTEGRATING ORAL EVERY 8 HOURS PRN
Status: DISCONTINUED | OUTPATIENT
Start: 2023-06-28 | End: 2023-06-28 | Stop reason: HOSPADM

## 2023-06-28 RX ORDER — LIDOCAINE HYDROCHLORIDE 10 MG/ML
INJECTION INFILTRATION; PERINEURAL
Status: DISCONTINUED | OUTPATIENT
Start: 2023-06-28 | End: 2023-06-28 | Stop reason: HOSPADM

## 2023-06-28 RX ORDER — NITROGLYCERIN 20 MG/100ML
INJECTION INTRAVENOUS
Status: DISCONTINUED | OUTPATIENT
Start: 2023-06-28 | End: 2023-06-28 | Stop reason: HOSPADM

## 2023-06-28 RX ORDER — HEPARIN SODIUM 5000 [USP'U]/ML
INJECTION, SOLUTION INTRAVENOUS; SUBCUTANEOUS
Status: DISCONTINUED | OUTPATIENT
Start: 2023-06-28 | End: 2023-06-28 | Stop reason: HOSPADM

## 2023-06-28 RX ORDER — ACETAMINOPHEN 325 MG/1
650 TABLET ORAL EVERY 4 HOURS PRN
Status: DISCONTINUED | OUTPATIENT
Start: 2023-06-28 | End: 2023-06-28 | Stop reason: HOSPADM

## 2023-06-28 RX ADMIN — DIPHENHYDRAMINE HYDROCHLORIDE 25 MG: 25 CAPSULE ORAL at 06:06

## 2023-06-28 RX ADMIN — SODIUM CHLORIDE: 9 INJECTION, SOLUTION INTRAVENOUS at 05:06

## 2023-06-28 NOTE — BRIEF OP NOTE
Angiogram, Coronary, with Left Heart Cath Brief Op Note  Patient Name: Nu Denny  MRN: [id number]  : 1945  Date of Procedure: 2023  Location of Procedure: [facility] CATH LAB    Procedure: Angiogram, Coronary, with Left Heart Cath    Pre-op Dx:   Abnormal stress test     Post-op Dx:   Non-obstructive CAD     Staff: Surgeon(s):  MD Spencer Hill MD     Access:   Right  radial     Findings:   -LM:  no stenosis      -LAD:   30% mid LAD stenosis       -LCX:   no stenosis      -RCA:   no stenosis        -LVEDP: 18 mmHg           Implants: * No implants in log *     Complications:  No immediate complications            Disposition:  7E with plans to discharge           Condition:  stable     Impression:  Successful coronary angiogram     Plan:  Medical management     Supervised by:  Kaveh Morales MD  Interventional Cardiology       Spencer Rae MD, MSCI  [facility] Cardiology Fellow  2023   8:08 AM  [facility]

## 2023-06-28 NOTE — DISCHARGE INSTRUCTIONS
Keep right hand/wrist still and elevated X 24 hours. You may remove the armboard and start using  Your right hand for simple activities in 24 hours. No heavy lifting or strenuous activities X 5 days,   no activities that may cause an infection at the puncture site X 5 days.  Continue current medications.   Keep dressing clean and dry X 48 hours. You may remove the dressing in 48 hours, Friday, wash puncture   Site gently with mild soap and keep open to air.

## 2023-06-28 NOTE — Clinical Note
58 ml of contrast were injected throughout the case. 10 mL of contrast was the total wasted during the case. 68 mL was the total amount used during the case.

## 2023-06-28 NOTE — Clinical Note
The catheter was repositioned into the ostium   left main. An angiography was performed of the left coronary arteries. The angiography was performed via power injection.

## 2023-06-28 NOTE — Clinical Note
The radial band was applied to the right radial artery. 11 cc's of air were inserted into the closure device. Fingers cool to touch. Cap refill < 3 sec. Move fingers w/ out any pain or discomfort.

## 2023-07-14 ENCOUNTER — OFFICE VISIT (OUTPATIENT)
Dept: OPHTHALMOLOGY | Facility: CLINIC | Age: 78
End: 2023-07-14
Payer: MEDICARE

## 2023-07-14 VITALS — BODY MASS INDEX: 36.91 KG/M2 | WEIGHT: 188 LBS | HEIGHT: 60 IN

## 2023-07-14 DIAGNOSIS — H04.123 DRY EYE SYNDROME OF BOTH EYES: ICD-10-CM

## 2023-07-14 DIAGNOSIS — H34.8110 CENTRAL RETINAL VEIN OCCLUSION WITH MACULAR EDEMA OF RIGHT EYE: Primary | ICD-10-CM

## 2023-07-14 PROCEDURE — 99213 OFFICE O/P EST LOW 20 MIN: CPT | Mod: PBBFAC,PO | Performed by: STUDENT IN AN ORGANIZED HEALTH CARE EDUCATION/TRAINING PROGRAM

## 2023-07-14 PROCEDURE — 92250 FUNDUS PHOTOGRAPHY W/I&R: CPT | Mod: PBBFAC,PO | Performed by: OPHTHALMOLOGY

## 2023-07-14 PROCEDURE — 92134 CPTRZ OPH DX IMG PST SGM RTA: CPT | Mod: PBBFAC,PO | Performed by: OPHTHALMOLOGY

## 2023-07-14 NOTE — PROGRESS NOTES
HPI     Annual Exam     Additional comments: RTC 4 months dfe, oct mac AS           Comments    Pt states using ATs BID. States it's helping.           Last edited by Lexus Bailon RN on 7/14/2023 11:37 AM.            Assessment /Plan     7/14/23  OD: 435, cystic IRF stable from prior  OS: 329, flat with good FC, ERM    For exam results, see Encounter Report.    Central retinal vein occlusion with macular edema of right eye  -     Color Fundus Photography - OU - Both Eyes    Dry eye syndrome of both eyes              OCT RNFL 7/1/22  OD: 9/10, 105um, all white/green  OS: 8/10, 86um, all green     OCT Mac 7/1/22  OD: 539um, large cystic IRF  OS: 322um, stable ERM, no IRF/SRF     OCT MAC 11/15/22  OD:  421 generalized edema focused nasally; grossly stable from prior but improved form 4/2022 and prior  OS:  326, stable ERM, no IRF/SRF              2/2/23 488 stable edema //  tr erm, stable     IVFA 11/30/22: overexposed image ou, prp scars and expected disc staining. No leakage ou     ==================    1) CRVO OD with CME  - Unclear timing although documented 10/2019  - IVFA unable to be done due to poor venous access  - Carotid showed <50% stenosis OU.  INJECTION LOG  - Avastin (10/2019)- no response, ARYA 2 (7/14/2020) - poor response  - Ozurdex (12/4/2019) - worked great previously  - Eylea 3/3 (8/19/2020, 9/17/2020, 10/16/20  - Plan most of 2020 was to proceed with ozurdex injection if no improvement in mac edema. After discussion with Dr. Greer, does not think ozurdex would provide significant benefit over triessence. Also, there is concern that vein occlusion has worsened given lack of improvement and significant worsening of edema and vision over past 5 months at end of 2020.  - Patient elected to stop injections in 2020 given she has not had significant vision changes with injections  - Exam 1/31/22 no NVI or NVA on SLE and gonio. New NVD & frond of NVE inferotemporally.  - OCT mac 1/31/22 shows  large amount of CME, ARYA OD given 1/31/22, treating for NVD and NVE to prevent NVG  - PRP OD 2/16/22, with fill in 03/16/22, 7/20/22  - 4/22: without NVI and NVA, NV looks to be fully regressed or close to it, PRP only 4 weeks out, will bring back in 1 month for eval for DFE/OCT mac- pt may need more PRP vs ARYA vs observation  - 7/1/22: OD NVD with diffuse macular DBH.   - 7/20/22 for PRP OD fill  - 11/15/22; no NVI or NVA; odd NVD vs collaterals noted OD.  Needs IVFA prior to continued injections or laser.  IOP and vision stable  - 11/30/22; no nvi/nva, no nvd or nve. Ivfa performed with overexposed image but no leakage ou. Will start spacing out visits as long as she is stable  - 2/2/23 doing well. Stable edema and vision. No NVI. Continue to space out visits.  - 7/14/23: stable edema and vision. No NVI or NVE but collaterals vs. NVD OD. Patient with great  so favor collateral vessels over NV. Fundus photos taken today. Repeat DFE and OCT Mac in 2 months and NVD check OD.     2) Age-related nuclear cataract, left eye  - High threshold for surgery given functionally monocular, cleared by Retina for surgery  - Last MRX on 1/31/22  - Monitor    3) ERM, OU  - H/o ERM OD s/p 25g PPV, ERM/ILM peel with FAx 5/23/17. Stable  - H/o ERM OS, NVS, OCT mac 7/1/22 stable  - Monitor    4) Pseudophakia of right eye  - Done 4/24/12 by Dr. Little, SN60WF 18.5D  - Monitor    5) Posterior vitreous detachment, left eye  - No holes/tears/breaks on DFE  - RD precautions    6) Dry eye syndrome of both lacrimal glands  - AT 4-6x/day as needed, lid hygiene  - No complaints today    RTC 2 months dfe, oct mac, NVD check OD

## 2023-07-24 ENCOUNTER — OFFICE VISIT (OUTPATIENT)
Dept: FAMILY MEDICINE | Facility: CLINIC | Age: 78
End: 2023-07-24
Payer: MEDICARE

## 2023-07-24 VITALS
HEIGHT: 60 IN | DIASTOLIC BLOOD PRESSURE: 79 MMHG | RESPIRATION RATE: 18 BRPM | SYSTOLIC BLOOD PRESSURE: 130 MMHG | TEMPERATURE: 98 F | OXYGEN SATURATION: 97 % | WEIGHT: 195.19 LBS | HEART RATE: 77 BPM | BODY MASS INDEX: 38.32 KG/M2

## 2023-07-24 DIAGNOSIS — M85.852 OSTEOPENIA OF NECKS OF BOTH FEMURS: ICD-10-CM

## 2023-07-24 DIAGNOSIS — K21.9 GASTROESOPHAGEAL REFLUX DISEASE, UNSPECIFIED WHETHER ESOPHAGITIS PRESENT: ICD-10-CM

## 2023-07-24 DIAGNOSIS — E78.2 MIXED HYPERLIPIDEMIA: ICD-10-CM

## 2023-07-24 DIAGNOSIS — M47.892 OTHER OSTEOARTHRITIS OF SPINE, CERVICAL REGION: Primary | ICD-10-CM

## 2023-07-24 DIAGNOSIS — I10 PRIMARY HYPERTENSION: ICD-10-CM

## 2023-07-24 DIAGNOSIS — L71.9 ROSACEA: ICD-10-CM

## 2023-07-24 DIAGNOSIS — M85.851 OSTEOPENIA OF NECKS OF BOTH FEMURS: ICD-10-CM

## 2023-07-24 PROCEDURE — 99215 OFFICE O/P EST HI 40 MIN: CPT | Mod: 25,PBBFAC

## 2023-07-24 PROCEDURE — 96372 THER/PROPH/DIAG INJ SC/IM: CPT | Mod: PBBFAC

## 2023-07-24 RX ORDER — METRONIDAZOLE 7.5 MG/G
GEL TOPICAL 2 TIMES DAILY
Qty: 45 G | Refills: 1 | Status: SHIPPED | OUTPATIENT
Start: 2023-07-24 | End: 2023-10-23 | Stop reason: SDUPTHER

## 2023-07-24 RX ORDER — ATORVASTATIN CALCIUM 20 MG/1
20 TABLET, FILM COATED ORAL DAILY
Qty: 90 TABLET | Refills: 0 | Status: SHIPPED | OUTPATIENT
Start: 2023-07-24 | End: 2023-10-26 | Stop reason: SDUPTHER

## 2023-07-24 RX ORDER — LOSARTAN POTASSIUM 50 MG/1
50 TABLET ORAL DAILY
Qty: 90 TABLET | Refills: 0 | Status: SHIPPED | OUTPATIENT
Start: 2023-07-24 | End: 2023-10-23 | Stop reason: SDUPTHER

## 2023-07-24 RX ORDER — FAMOTIDINE 20 MG/1
20 TABLET, FILM COATED ORAL 2 TIMES DAILY
Qty: 180 TABLET | Refills: 0 | Status: SHIPPED | OUTPATIENT
Start: 2023-07-24 | End: 2024-02-08 | Stop reason: SDUPTHER

## 2023-07-24 RX ADMIN — DENOSUMAB 60 MG: 60 INJECTION SUBCUTANEOUS at 09:07

## 2023-07-24 NOTE — PROGRESS NOTES
[facility] OFFICE VISIT NOTE  MRN: [id number]  Date: 07/24/2023    Chief Complaint: Routine follow up (Routine follow up. Needs meds refills (list on counter). Here today for Prolia shot. C/O head/neck pain.)      Subjective:    HPI  Nu Denny is a 78 y.o. female  presenting to [facility] for :    Neck pain- has been ongoing for years now and she had cervical xray in 09/2022 showing vertebral degenerative changes at C3-C4 and C6-C7. She says pain is worse with any movement and she has been taking Tylenol extra strength everyday for her arthritis pain which has not been helping he neck pain. She has gotten new pillows and has not tried heat therapy yet. The pain does not radiate down her arms or down her back and stays locally in neck.    Osteoporosis- last fall was about 4 months ago per patient. She had her first dose of Prolia in 06/2022 and second in 12/2023. She is due for her third injection today.She denies and negative side effects from her Prolia injection. Currently also supplementing with vitamin D and calcium that she buys OTC.    HTN- Well controlled as evidenced by BP in clinic today at 130/79; patient states her BP at home has been in the 140 systolic and she is unsure of the diastolic. She denies CP, SOB, syncope, acute vision changes, or dizziness.      She is requesting medication refills for her atorvastatin, losartan, pepcid,and metronidazole cream for her rosacea.    Health Maintenance   Topic Date Due    DEXA Scan  01/06/2024    TETANUS VACCINE  01/29/2026    Lipid Panel  03/31/2028    Hepatitis C Screening  Completed   Breast: Birads I Bialt (10/2022)   Cervix: s/p total hysterectomy, not 2/2 malignancy.   Lung: Never smoker.   Bone : Osteopenia w/ Elveated Frax. Will get Prolia #2 today, 7/24/23  Colon: + Fit testing. Sent for C-Scope to [facility] and got cardiac clearance after having positive stress test and heart cath 6/28/23. Patient to schedule and is aware.  Immunizations: UTD.        Review of Systems   Constitutional:  Negative for chills and fever.   HENT:  Negative for congestion.         No dysphagia.   Eyes:  Negative for photophobia and pain.   Respiratory:  Negative for cough, shortness of breath and wheezing.    Cardiovascular:  Negative for chest pain and leg swelling.   Gastrointestinal:  Negative for constipation, diarrhea, nausea and vomiting.   Genitourinary:  Negative for dysuria, frequency and hematuria.   Musculoskeletal:  Negative for falls.   Skin:  Negative for rash.   Neurological:  Negative for dizziness and weakness.    ROS also as seen in HPI above.    Current Medications:   Current Outpatient Medications   Medication Sig Dispense Refill    acetaminophen (TYLENOL) 650 MG TbSR Take 650 mg by mouth every 8 (eight) hours.      APPLE CIDER VINEGAR ORAL Take by mouth.      aspirin (ECOTRIN) 81 MG EC tablet Take 81 mg by mouth once daily.      atorvastatin (LIPITOR) 20 MG tablet Take 1 tablet (20 mg total) by mouth once daily. 90 tablet 0    b complex vitamins capsule Take 1 capsule by mouth once daily.      calcium carbonate (OS-SREEDHAR) 500 mg calcium (1,250 mg) chewable tablet Take 1 tablet by mouth once daily.      cetirizine 10 mg chewable tablet Take 10 mg by mouth once daily.      diclofenac sodium (VOLTAREN) 1 % Gel Apply 2 g topically 4 (four) times daily. PRN for pain 100 g 0    docusate sodium (COLACE) 100 MG capsule Take 100 mg by mouth once daily at 6am.      erythromycin (ROMYCIN) ophthalmic ointment Place into the right eye 2 (two) times a day. Use in morning and before bed (Patient not taking: Reported on 5/30/2023) 3.5 g 0    famotidine (PEPCID) 20 MG tablet Take 1 tablet (20 mg total) by mouth 2 (two) times daily. 180 tablet 0    losartan (COZAAR) 50 MG tablet Take 1 tablet (50 mg total) by mouth once daily. 90 tablet 0    methylcellulose oral powder Take 3.4 g by mouth once daily.      metroNIDAZOLE (METROGEL) 0.75 % gel Apply topically 2 (two) times daily.  Apply to face PRN for rosacea related rash 45 g 1    multivitamin capsule Take 1 capsule by mouth once daily.      sertraline (ZOLOFT) 50 MG tablet Take 1 tablet (50 mg total) by mouth once daily. 90 tablet 0    vitamin D (VITAMIN D3) 1000 units Tab Take 1,000 Units by mouth once daily.       Current Facility-Administered Medications   Medication Dose Route Frequency Provider Last Rate Last Admin    sodium chloride 0.9% flush 10 mL  10 mL Intravenous PRN Kaveh Morales MD           Objective:  Blood pressure 130/79, pulse 77, temperature 97.9 °F (36.6 °C), temperature source Oral, resp. rate 18, height 5' (1.524 m), weight 88.5 kg (195 lb 3.2 oz), SpO2 97 %.   Physical Exam  Constitutional:       General: She is not in acute distress.     Appearance: She is not ill-appearing.      Comments: Elderly appearing female cooperative with examination.   Cardiovascular:      Rate and Rhythm: Normal rate and regular rhythm.      Heart sounds: Normal heart sounds. No murmur heard.     Comments: 2+ radial pulses B/L  Pulmonary:      Effort: Pulmonary effort is normal. No respiratory distress.      Breath sounds: Normal breath sounds. No wheezing.   Abdominal:      General: Abdomen is flat. There is no distension.      Palpations: Abdomen is soft. There is no mass.      Tenderness: There is no abdominal tenderness. There is no guarding.   Musculoskeletal:      Cervical back: Normal range of motion and neck supple. No rigidity or tenderness.      Right lower leg: No edema.      Left lower leg: No edema.   Lymphadenopathy:      Cervical: No cervical adenopathy.   Skin:     General: Skin is warm.      Capillary Refill: Capillary refill takes less than 2 seconds.   Neurological:      Mental Status: She is alert.      Comments: Negative Spurling's.         Assessment/ Plan:    Osteoarthritis of Cervical Spine  - As evidenced via cervical x-ray from 9/27/22  - Told to rotate OTC extra strength tylenol and OTC ibuprofen 600 mg q6  hrs PRN for her neck pain  - Rest and heat therapy recommended     Osteopenia of necks of both femurs  - Stable without recent fall within last 4 months  - Continue taking calcium and Vit D OTC  - Received third dose of Prolia denosumab (PROLIA) injection 60 mg at today's visit.    Mixed hyperlipidemia  - Last Lipid panel done on 3/31/23  - Continue atorvastatin (LIPITOR) 20 MG tablet; Take 1 tablet (20 mg total) by mouth once daily.  Dispense: 90 tablet; Refill: 0. Refilled today.    Gastroesophageal reflux disease, unspecified whether esophagitis present  - Continue famotidine (PEPCID) 20 MG tablet; Take 1 tablet (20 mg total) by mouth 2 (two) times daily.  Dispense: 180 tablet; Refill: 0. Refilled today.    Primary hypertension  - Continue losartan (COZAAR) 50 MG tablet; Take 1 tablet (50 mg total) by mouth once daily.  Dispense: 90 tablet; Refill: 0. Refilled today.    Rosacea  - Continue metroNIDAZOLE (METROGEL) 0.75 % gel; Apply topically 2 (two) times daily. Apply to face PRN for rosacea related rash  Dispense: 45 g; Refill: 1. Refilled today             Return to clinic in 1 month for Follow up with PCP, or sooner if needed.         Tran Gates DO  [employer]  Resident, HO-2

## 2023-07-26 NOTE — PROGRESS NOTES
Faculty addendum: Patient discussed and examined on day of encounter with resident.  Care provided reasonable and necessary. I participated in the management of the patient and was immediately available throughout the encounter. Services were furnished in a primary care center located in the outpatient department of a [facility]. I agree with the resident's findings and plan as documented in the resident's note.     Physical exam: well appearing in no acute distress, non labored on room air    Lexus Ocampo DO

## 2023-07-28 ENCOUNTER — DOCUMENTATION ONLY (OUTPATIENT)
Dept: ADMINISTRATIVE | Facility: HOSPITAL | Age: 78
End: 2023-07-28
Payer: MEDICARE

## 2023-08-16 LAB
INR PPP: 1
PROTHROMBIN TIME: 13 SECONDS (ref 11.4–14)

## 2023-08-28 ENCOUNTER — OFFICE VISIT (OUTPATIENT)
Dept: FAMILY MEDICINE | Facility: CLINIC | Age: 78
End: 2023-08-28
Payer: MEDICARE

## 2023-08-28 VITALS
BODY MASS INDEX: 38.68 KG/M2 | DIASTOLIC BLOOD PRESSURE: 77 MMHG | HEIGHT: 60 IN | TEMPERATURE: 98 F | OXYGEN SATURATION: 98 % | SYSTOLIC BLOOD PRESSURE: 138 MMHG | WEIGHT: 197 LBS | HEART RATE: 71 BPM | RESPIRATION RATE: 17 BRPM

## 2023-08-28 DIAGNOSIS — M85.852 OSTEOPENIA OF NECKS OF BOTH FEMURS: ICD-10-CM

## 2023-08-28 DIAGNOSIS — M85.851 OSTEOPENIA OF NECKS OF BOTH FEMURS: ICD-10-CM

## 2023-08-28 DIAGNOSIS — M54.2 NECK PAIN, CHRONIC: ICD-10-CM

## 2023-08-28 DIAGNOSIS — I10 PRIMARY HYPERTENSION: ICD-10-CM

## 2023-08-28 DIAGNOSIS — Z12.31 BREAST CANCER SCREENING BY MAMMOGRAM: ICD-10-CM

## 2023-08-28 DIAGNOSIS — G89.29 NECK PAIN, CHRONIC: ICD-10-CM

## 2023-08-28 DIAGNOSIS — G47.00 INSOMNIA, UNSPECIFIED TYPE: Primary | ICD-10-CM

## 2023-08-28 PROCEDURE — 99215 OFFICE O/P EST HI 40 MIN: CPT | Mod: PBBFAC

## 2023-08-28 RX ORDER — SERTRALINE HYDROCHLORIDE 50 MG/1
50 TABLET, FILM COATED ORAL DAILY
Qty: 90 TABLET | Refills: 0 | Status: SHIPPED | OUTPATIENT
Start: 2023-08-28 | End: 2023-12-05 | Stop reason: SDUPTHER

## 2023-08-28 NOTE — PROGRESS NOTES
/[facility] OFFICE VISIT NOTE/  Nu Denny  [id number]  08/28/2023    Chief Complaint   Patient presents with    Medication Refill     Zoloft (90 day prescription)    Osteoarthritis    Hyperlipidemia    Hypertension    Gastroesophageal Reflux    Rosacea       Nu Denny is a 78 y.o. female  presenting to [facility] for:    Insomnia/Depression: Reports sleep has improved with use of Zoloft. She takes Zoloft at 9pm every night and reports sleeping 7-8 hours. Denies feeling down and SI.    Neck pain: Pain is chronic. Cervical x-ray done 9/22 showing degenerative changes at C3-C4 and C6-C7. Pain is a 5/10 today, worse when she flexes her neck. She takes OTC tylenol and ibuprofen and performs neck stretches/exercises with moderate relief. The pain does not radiate down her arms or down her back and stays locally in neck.    Osteopenia: last fall 5 days ago, tripped in hole in ground, landed on right knee, denies head trauma, or residual knee pain. She has received 3 injections of Prolia, first dose 6/2022, second dose 12/2022/ and third dose 7/24/2023. She denies negative side effects from Prolia injection. Currently Supplementing with vitamin D and calcium that she buys otc.    HTN: well controlled with BP at clinic today 138/77; patient normally checks blood pressure at home but has not been checking it this past week. She denies CP, SOB, acute vision changes, or dizziness.    Migraine: reports headaches once per week, unilateral, typically on the right side with aura. She takes a BC powder at the onset that alleviates pain.      She is requesting medication refill for her sertraline for insomnia/depression      Health Maintenance  Optho: next appt 9/18/2023, for central renal vein occlusion. Discussed possible radiation to preserve eye.  Cardiology: next appt 10/31/2023, cath done 6/28/23 revealed 30% stenosis mid LAD 10/31/2023  Colonscopy: received call asking if they had been cleared by cardio before making  appointment.   Mammogram: last done 10/27/2023 [id number]      Review of Systems   Constitutional:  Negative for appetite change and fever.   Respiratory:  Negative for shortness of breath.    Cardiovascular:  Positive for leg swelling (right leg swelling, due to spur in foot/ankle and knee osteoarthritis). Negative for chest pain.   Gastrointestinal:  Negative for constipation, diarrhea, nausea and vomiting.   Genitourinary:  Negative for dysuria.   Musculoskeletal:  Positive for neck pain.   Neurological:  Negative for dizziness and weakness.       Vitals:    08/28/23 0820   BP: 138/77   Pulse: 71   Resp: 17   Temp: 97.6 °F (36.4 °C)   TempSrc: Oral   SpO2: 98%   Weight: 89.4 kg (197 lb)   Height: 5' (1.524 m)      Physical Exam  Constitutional:       General: She is not in acute distress.     Appearance: She is not ill-appearing.   Neck:      Vascular: No carotid bruit.   Cardiovascular:      Rate and Rhythm: Normal rate and regular rhythm.      Pulses:           Dorsalis pedis pulses are 2+ on the right side and 2+ on the left side.      Heart sounds: Normal heart sounds, S1 normal and S2 normal.   Pulmonary:      Effort: Pulmonary effort is normal. No respiratory distress.      Breath sounds: Normal breath sounds.   Abdominal:      Palpations: Abdomen is soft.      Tenderness: There is no abdominal tenderness.   Musculoskeletal:      Cervical back: Pain with movement present.      Right lower leg: No edema.      Left lower leg: No edema.      Right ankle: Swelling present. No deformity. No tenderness. Normal range of motion.   Neurological:      Mental Status: She is alert.   Psychiatric:         Behavior: Behavior is cooperative.       Current Medications:   Current Outpatient Medications   Medication Sig Dispense Refill    acetaminophen (TYLENOL) 650 MG TbSR Take 650 mg by mouth every 8 (eight) hours.      APPLE CIDER VINEGAR ORAL Take by mouth.      aspirin (ECOTRIN) 81 MG EC tablet Take 81 mg by mouth once  daily.      atorvastatin (LIPITOR) 20 MG tablet Take 1 tablet (20 mg total) by mouth once daily. 90 tablet 0    b complex vitamins capsule Take 1 capsule by mouth once daily.      calcium carbonate (OS-SREEDHAR) 500 mg calcium (1,250 mg) chewable tablet Take 1 tablet by mouth once daily.      cetirizine 10 mg chewable tablet Take 10 mg by mouth once daily.      diclofenac sodium (VOLTAREN) 1 % Gel Apply 2 g topically 4 (four) times daily. PRN for pain 100 g 0    docusate sodium (COLACE) 100 MG capsule Take 100 mg by mouth once daily at 6am.      famotidine (PEPCID) 20 MG tablet Take 1 tablet (20 mg total) by mouth 2 (two) times daily. 180 tablet 0    losartan (COZAAR) 50 MG tablet Take 1 tablet (50 mg total) by mouth once daily. 90 tablet 0    methylcellulose oral powder Take 3.4 g by mouth once daily.      metroNIDAZOLE (METROGEL) 0.75 % gel Apply topically 2 (two) times daily. Apply to face PRN for rosacea related rash 45 g 1    multivitamin capsule Take 1 capsule by mouth once daily.      sertraline (ZOLOFT) 50 MG tablet Take 1 tablet (50 mg total) by mouth once daily. 90 tablet 0    vitamin D (VITAMIN D3) 1000 units Tab Take 1,000 Units by mouth once daily.       Current Facility-Administered Medications   Medication Dose Route Frequency Provider Last Rate Last Admin    sodium chloride 0.9% flush 10 mL  10 mL Intravenous PRN Kaveh Morales MD           Assessment:   1. Breast cancer screening by mammogram    2. Primary hypertension    3. Neck pain, chronic    4. Osteopenia of necks of both femurs    5. Insomnia, unspecified type        Plan:  - referral sent for mammogram scheduling after 10/27/2023.  - continue to monitor blood pressure at home, discussed lifestyle modifications to aid in weight loss goals, continue losartan   - continue to alternate otc tylenol and ibuprofen for pain an neck stretches  - return to clinic in December for Prolia shot, continue vitamin d and calcium supplementation, initiate short  intervals of weight bearing exercises  - continue improving sleep hygiene, I.e. not allowing dogs into the bed to play at night, refilled Zoloft 50 mg    Orders Placed This Encounter    Mammo Digital Screening Bilat w/ Kenneth    sertraline (ZOLOFT) 50 MG tablet       Return to clinic in 4 months for routine follow up, or sooner if needed.     Carey Gonzalez DO  [employer]  Resident, HO-1

## 2023-09-18 ENCOUNTER — OFFICE VISIT (OUTPATIENT)
Dept: OPHTHALMOLOGY | Facility: CLINIC | Age: 78
End: 2023-09-18
Payer: MEDICARE

## 2023-09-18 VITALS — HEIGHT: 60 IN | WEIGHT: 196.19 LBS | BODY MASS INDEX: 38.52 KG/M2

## 2023-09-18 DIAGNOSIS — H34.8110 CENTRAL RETINAL VEIN OCCLUSION WITH MACULAR EDEMA OF RIGHT EYE: Primary | ICD-10-CM

## 2023-09-18 PROCEDURE — 99213 OFFICE O/P EST LOW 20 MIN: CPT | Mod: PBBFAC,PN | Performed by: STUDENT IN AN ORGANIZED HEALTH CARE EDUCATION/TRAINING PROGRAM

## 2023-09-18 PROCEDURE — 92134 CPTRZ OPH DX IMG PST SGM RTA: CPT | Mod: PBBFAC,PN | Performed by: STUDENT IN AN ORGANIZED HEALTH CARE EDUCATION/TRAINING PROGRAM

## 2023-09-18 PROCEDURE — 92134 CPTRZ OPH DX IMG PST SGM RTA: CPT | Mod: PBBFAC,PN | Performed by: OPHTHALMOLOGY

## 2023-09-18 RX ORDER — PHENYLEPH/TROPICAMIDE IN WATER 2.5 %-1 %
1 DROPS OPHTHALMIC (EYE) ONCE
Status: COMPLETED | OUTPATIENT
Start: 2023-09-18 | End: 2023-09-18

## 2023-09-18 RX ORDER — IBUPROFEN 200 MG
400 TABLET ORAL EVERY 6 HOURS PRN
COMMUNITY

## 2023-09-18 RX ADMIN — Medication 1 DROP: at 10:09

## 2023-09-18 NOTE — PROGRESS NOTES
Faculty Attestation: Patient was seen in [facility] clinic. Patient was seen and examined by me at the time of the visit. I have personally reviewed History of the Present Illness , Review of Systems, PFSH , Physical Exam, Assessment and Plan documented by the resident. I participated in the management of the patient and was immediately available throughout the encounter. Importance of adherence to treatment recommendations discussed with patient at the time of the visit. Services were furnished in a primary care center in the outpatient department at a teaching hospital. I discussed the patient with the resident and agree with resident's findings and plan as documented in the resident note. Florence Watkins MD

## 2023-09-18 NOTE — PROGRESS NOTES
HPI      RTC 2 months dfe, oct mac, NVD check OD  Last edited by Kelli Hart RN on 9/18/2023 10:17 AM.            Assessment /Plan     7/14/23  OD: 435, cystic IRF stable from prior  OS: 329, flat with good FC, ERM    For exam results, see Encounter Report.    Central retinal vein occlusion with macular edema of right eye  -     tropicamide /PHENYLephrine opthalmic solution 1 drop           OCT RNFL 7/1/22  OD: 9/10, 105um, all white/green  OS: 8/10, 86um, all green     OCT Mac 9/18/23  OD: 466 um, large cystic IRF  OS: 333 um, stable ERM, no IRF/SRF     IVFA 11/30/22: overexposed image ou, prp scars and expected disc staining. No leakage ou     ==================    1) CRVO OD with CME  - Unclear timing although documented 10/2019  - IVFA unable to be done due to poor venous access  - Carotid showed <50% stenosis OU.  INJECTION LOG  - Avastin (10/2019)- no response, ARYA 2 (7/14/2020) - poor response  - Ozurdex (12/4/2019) - worked great previously  - Eylea 3/3 (8/19/2020, 9/17/2020, 10/16/20  - Plan most of 2020 was to proceed with ozurdex injection if no improvement in mac edema. After discussion with Dr. Greer, does not think ozurdex would provide significant benefit over triessence. Also, there is concern that vein occlusion has worsened given lack of improvement and significant worsening of edema and vision over past 5 months at end of 2020.  - Patient elected to stop injections in 2020 given she has not had significant vision changes with injections  - Exam 1/31/22 New NVD & frond of NVE inferotemporally.  - ARYA OD given 1/31/22, treating for NVD and NVE to prevent NVG  - PRP OD 2/16/22, with fill in 03/16/22, 7/20/22  - 11/15/22; no NVI or NVA; odd NVD vs collaterals noted OD.  Needs IVFA prior to continued injections or laser.  IOP and vision stable  - IVFA 11/30/22: overexposed image but no leakage ou. Will start spacing out visits as long as she is stable  - concern for NVD 7/14/23 vs collaterals   -  9/18/23 exam stable: will extend to 4 mo DFE and OCT mac sooner PRN    2) Age-related nuclear cataract, left eye  - High threshold for surgery given functionally monocular, cleared by Retina for surgery  - Last MRX on 1/31/22  - Monitor    3) ERM, OU  - H/o ERM OD s/p 25g PPV, ERM/ILM peel with FAx 5/23/17. Stable  - H/o ERM OS, NVS, OCT mac 7/1/22 stable  - Monitor    4) Pseudophakia of right eye  - Done 4/24/12 by Dr. Little, SN60WF 18.5D  - Monitor    5) Posterior vitreous detachment, left eye  - No holes/tears/breaks on DFE  - RD precautions    6) Dry eye syndrome of both lacrimal glands  - AT 4-6x/day as needed, lid hygiene  - No complaints today    RTC 4 months dfe, oct mac, NVD check OD

## 2023-09-21 NOTE — PROGRESS NOTES
Discussed with resident at time of encounter; immediately available. Resident's note reviewed in entirety.  Agree with assessment.  Plan of care appropriate.  Professional services provided in an outpatient primary care center affiliated with a teaching institution.

## 2023-10-23 DIAGNOSIS — L71.9 ROSACEA: ICD-10-CM

## 2023-10-23 DIAGNOSIS — I10 PRIMARY HYPERTENSION: ICD-10-CM

## 2023-10-23 RX ORDER — METRONIDAZOLE 7.5 MG/G
GEL TOPICAL 2 TIMES DAILY
Qty: 45 G | Refills: 1 | Status: SHIPPED | OUTPATIENT
Start: 2023-10-23 | End: 2023-12-05 | Stop reason: SDUPTHER

## 2023-10-23 RX ORDER — LOSARTAN POTASSIUM 50 MG/1
50 TABLET ORAL DAILY
Qty: 90 TABLET | Refills: 0 | Status: SHIPPED | OUTPATIENT
Start: 2023-10-23 | End: 2024-02-08 | Stop reason: SDUPTHER

## 2023-10-26 DIAGNOSIS — E78.2 MIXED HYPERLIPIDEMIA: ICD-10-CM

## 2023-10-26 RX ORDER — ATORVASTATIN CALCIUM 20 MG/1
20 TABLET, FILM COATED ORAL DAILY
Qty: 90 TABLET | Refills: 0 | Status: SHIPPED | OUTPATIENT
Start: 2023-10-26 | End: 2024-02-08 | Stop reason: SDUPTHER

## 2023-10-30 NOTE — PROGRESS NOTES
CHIEF COMPLAINT:   Chief Complaint   Patient presents with    Post [facility]      Denies any cardiac problems                                                  HPI:  Nu Denny 78 y.o. female with PMH of CAD, nonobstructive per [facility] 6.28.23, HTN, HLD, GERD, osteopenia, chronic neck pain who presents to cardiology clinic for routine follow-up post left heart catheterization.  Due to previous complaints of chest pain, the patient completed a nuclear stress test on 5.19.23 that was abnormal, revealing a moderate intensity, small to moderate-sized reversible perfusion defect in the LAD territory.  She underwent a subsequent left heart catheterization on 6.28.23 that revealed nonobstructive coronary artery disease, 30% mid LAD stenosis.  Latest Echocardiogram obtained on 1.24.23 revealed a preserved EF- 65%. (See reports below).    Patient presents to clinic today denying any cardiac symptoms of chest pain, shortness of breath, palpitations, orthopnea, PND, claudication, lightheadedness, dizziness or syncope.  She does endorse occasional right ankle edema that she attributes to a bone spur but notes improvement with elevation.  The patient states that she is able to perform her regular activities and housework without experiencing any ischemic symptoms.  She reports compliance with her current medications and is currently tolerating without issue.                                                                                                                                                                                                                                                                                                                                                                                                                                                                                  CARDIAC TESTING:    Echo 1.24.23    Interpretation Summary  · The left ventricle is normal in size with concentric  remodeling and normal systolic function.  · The estimated ejection fraction is 65%.  · Grade I left ventricular diastolic dysfunction.  · Normal right ventricular size with normal right ventricular systolic function.  · There is no pulmonary hypertension.  · The estimated PA systolic pressure is 25 mmHg.  · Normal central venous pressure (3 mmHg).        Nuclear Stress - Cardiology Interpreted 5.19.23    Interpretation Summary    Abnormal myocardial perfusion scan.    There is a moderate intensity, small to moderate sized, reversible perfusion abnormality that is consistent with ischemia in the distal to apical apical wall(s) in the typical distribution of the LAD territory.    There are no other significant perfusion abnormalities.    The gated perfusion images showed an ejection fraction of 70% at rest. The gated perfusion images showed an ejection fraction of 80% post stress.    The patient reported no chest pain during the stress test.    There were no arrhythmias during stress.    On the nuclear stress test the EF is normal.  If the patient has an echo, the EF on the echo is considered more accurate.    The patient has a low to moderate risk nuclear stress test.    If patient is symptomatic, consider management with two anti-anginals         Cardiac catheterization  6.28.23    Conclusion    The pre-procedure left ventricular end diastolic pressure was 18.    The estimated blood loss was <50 mL.    There was non-obstructive coronary artery disease..    FINDINGS  LVEDP:  18 mmHg  Left Main:  No stenosis  LAD:   30% mid LAD stenosis  Circumflex:  No stenosis  RCA:  No stenosis  The patient has  Non-obstructive CAD  Blood loss:  less than 50 cc.    RECOMMENDATIONS  Medical management  Risk factor modifications -- smoking cessation  Activity -- avoid straining with affected limb for one week  Exercise on regular basis.  Precautions post D/C -- come to ER for hematoma, unusual pain, erythema, or unusual drainage at  access site  Precautions post D/C -- if develop bleeding or hematoma at access site, hold pressure at access site, and come to ER        The procedure log was documented by Documenter: Janet Pryor RN and verified by Spencer Rae MD.    Date: 6/28/2023  Time: 8:15 AM       Patient Active Problem List   Diagnosis    Hypertension    Hyperlipidemia    Anxiety    Gastroesophageal reflux disease    Osteopenia of neck of femur    Central retinal vein occlusion of right eye    Cataract of left eye    Urinary incontinence    Female bladder prolapse    Herpes zoster oticus    Pre-op evaluation     Past Surgical History:   Procedure Laterality Date    ANGIOGRAM, CORONARY, WITH LEFT HEART CATHETERIZATION N/A 6/28/2023    Procedure: Angiogram, Coronary, with Left Heart Cath;  Surgeon: Kaveh Morales MD;  Location: [facility] CATH LAB;  Service: Cardiology;  Laterality: N/A;    CHOLECYSTECTOMY      COLONOSCOPY  2017    HYSTERECTOMY      KNEE SURGERY Right     x2; states MD wanted to do replacement but pt declined    SHOULDER SURGERY  1995    TONSILLECTOMY      VITRECTOMY       Social History     Socioeconomic History    Marital status:    Tobacco Use    Smoking status: Never     Passive exposure: Past    Smokeless tobacco: Never   Substance and Sexual Activity    Alcohol use: Never    Drug use: Never        Family History   Problem Relation Age of Onset    Hypertension Mother     No Known Problems Father     No Known Problems Sister     No Known Problems Brother     No Known Problems Maternal Aunt     No Known Problems Maternal Uncle     No Known Problems Paternal Aunt     No Known Problems Paternal Uncle     Diabetes Maternal Grandmother     No Known Problems Maternal Grandfather     No Known Problems Paternal Grandmother     No Known Problems Paternal Grandfather     Amblyopia Neg Hx     Blindness Neg Hx     Cancer Neg Hx     Cataracts Neg Hx     Glaucoma Neg Hx     Macular degeneration Neg Hx     Retinal  detachment Neg Hx     Strabismus Neg Hx     Stroke Neg Hx     Thyroid disease Neg Hx      Review of patient's allergies indicates:   Allergen Reactions    Codeine Rash         ROS:  Review of Systems   Constitutional: Negative.    HENT: Negative.     Eyes: Negative.    Respiratory: Negative.  Negative for shortness of breath.    Cardiovascular: Negative.    Gastrointestinal: Negative.    Genitourinary: Negative.    Musculoskeletal: Negative.    Skin: Negative.    Neurological: Negative.    Endo/Heme/Allergies: Negative.    Psychiatric/Behavioral: Negative.                                                                                                                                                                                  Negative except as stated in the history of present illness. See HPI for details.    PHYSICAL EXAM:  Visit Vitals  /74 (BP Location: Right arm, Patient Position: Sitting, BP Method: X-Large (Manual))   Pulse 66   Temp 97.6 °F (36.4 °C) (Oral)   Resp 20   Ht 5' (1.524 m)   Wt 87.4 kg (192 lb 10.9 oz)   SpO2 100%   BMI 37.63 kg/m²       Physical Exam  HENT:      Head: Normocephalic.      Nose: Nose normal.   Eyes:      Pupils: Pupils are equal, round, and reactive to light.   Cardiovascular:      Rate and Rhythm: Normal rate and regular rhythm.   Pulmonary:      Effort: Pulmonary effort is normal.   Abdominal:      General: Abdomen is flat. Bowel sounds are normal.      Palpations: Abdomen is soft.   Musculoskeletal:         General: Normal range of motion.      Cervical back: Normal range of motion.   Skin:     General: Skin is warm.   Neurological:      General: No focal deficit present.      Mental Status: She is alert.   Psychiatric:         Mood and Affect: Mood normal.         Current Outpatient Medications   Medication Instructions    acetaminophen (TYLENOL) 650 mg, Oral, Every 8 hours    APPLE CIDER VINEGAR ORAL Oral    aspirin (ECOTRIN) 81 mg, Oral, Daily    atorvastatin  (LIPITOR) 20 mg, Oral, Daily    b complex vitamins capsule 1 capsule, Oral, Daily    calcium carbonate (OS-SREEDHAR) 500 mg calcium (1,250 mg) chewable tablet 1 tablet, Oral, Daily    cetirizine 10 mg, Oral, Daily    diclofenac sodium (VOLTAREN) 2 g, Topical (Top), 4 times daily, PRN for pain    docusate sodium (COLACE) 100 mg, Oral, Daily    famotidine (PEPCID) 20 mg, Oral, 2 times daily    ibuprofen (ADVIL,MOTRIN) 400 mg, Oral, Every 6 hours PRN, 3 x week alternate with Tylenol    losartan (COZAAR) 50 mg, Oral, Daily    methylcellulose oral powder 3.4 g, Oral, Daily    metroNIDAZOLE (METROGEL) 0.75 % gel Topical (Top), 2 times daily, Apply to face PRN for rosacea related rash    multivitamin capsule 1 capsule, Oral, Daily    sertraline (ZOLOFT) 50 mg, Oral, Daily    vitamin D (VITAMIN D3) 1,000 Units, Oral, Daily        All medications, laboratory studies, cardiac diagnostic imaging reviewed.     Lab Results   Component Value Date    LDL 98.00 03/31/2023    .00 07/02/2021    TRIG 105 03/31/2023    TRIG 94 07/02/2021    CREATININE 0.73 06/22/2023    MG 2.10 10/15/2021    K 4.5 06/22/2023        ASSESSMENT/PLAN:  CAD, nonobstructive   - s/p [facility]- nonobstructive CAD, 30% mid LAD stenosis ( 6.28.23)  - LVEF -65% per ECHO 1.24.23  - Continue ASA, Atorvastatin, losartan   - Advised on heart healthy, low-cholesterol diet and activity as tolerated    HTN  - BP at goal   - Continue current medications losartan 50 mg  - Advised on low salt diet    HLD  - Continue Atorvastatin   - Advised on low-cholesterol, low-fat diet and exercise as tolerated    Follow up in cardiology clinic in 6 months or sooner if needed  Follow up with PCP as directed

## 2023-10-31 ENCOUNTER — HOSPITAL ENCOUNTER (OUTPATIENT)
Dept: RADIOLOGY | Facility: HOSPITAL | Age: 78
Discharge: HOME | End: 2023-10-31
Payer: MEDICARE

## 2023-10-31 ENCOUNTER — OFFICE VISIT (OUTPATIENT)
Dept: CARDIOLOGY | Facility: CLINIC | Age: 78
End: 2023-10-31
Payer: MEDICARE

## 2023-10-31 ENCOUNTER — CLINICAL SUPPORT (OUTPATIENT)
Dept: FAMILY MEDICINE | Facility: CLINIC | Age: 78
End: 2023-10-31
Payer: MEDICARE

## 2023-10-31 VITALS
HEART RATE: 66 BPM | TEMPERATURE: 98 F | DIASTOLIC BLOOD PRESSURE: 74 MMHG | BODY MASS INDEX: 37.83 KG/M2 | HEIGHT: 60 IN | SYSTOLIC BLOOD PRESSURE: 138 MMHG | WEIGHT: 192.69 LBS | RESPIRATION RATE: 20 BRPM | OXYGEN SATURATION: 100 %

## 2023-10-31 VITALS — TEMPERATURE: 98 F

## 2023-10-31 DIAGNOSIS — Z23 FLU VACCINE NEED: Primary | ICD-10-CM

## 2023-10-31 DIAGNOSIS — E78.2 MIXED HYPERLIPIDEMIA: ICD-10-CM

## 2023-10-31 DIAGNOSIS — I25.10 CORONARY ARTERY DISEASE INVOLVING NATIVE CORONARY ARTERY OF NATIVE HEART WITHOUT ANGINA PECTORIS: Primary | ICD-10-CM

## 2023-10-31 DIAGNOSIS — Z12.31 BREAST CANCER SCREENING BY MAMMOGRAM: ICD-10-CM

## 2023-10-31 DIAGNOSIS — I10 PRIMARY HYPERTENSION: ICD-10-CM

## 2023-10-31 PROCEDURE — 99213 OFFICE O/P EST LOW 20 MIN: CPT | Mod: PBBFAC,25

## 2023-10-31 PROCEDURE — 99215 OFFICE O/P EST HI 40 MIN: CPT | Mod: PBBFAC,27 | Performed by: NURSE PRACTITIONER

## 2023-10-31 PROCEDURE — 77067 SCR MAMMO BI INCL CAD: CPT | Mod: 26,,, | Performed by: RADIOLOGY

## 2023-10-31 PROCEDURE — 77067 MAMMO DIGITAL SCREENING BILAT WITH TOMO: ICD-10-PCS | Mod: 26,,, | Performed by: RADIOLOGY

## 2023-10-31 PROCEDURE — G0008 ADMIN INFLUENZA VIRUS VAC: HCPCS | Mod: PBBFAC

## 2023-10-31 PROCEDURE — 99214 PR OFFICE/OUTPT VISIT, EST, LEVL IV, 30-39 MIN: ICD-10-PCS | Mod: S$PBB,,, | Performed by: NURSE PRACTITIONER

## 2023-10-31 PROCEDURE — 77063 MAMMO DIGITAL SCREENING BILAT WITH TOMO: ICD-10-PCS | Mod: 26,,, | Performed by: RADIOLOGY

## 2023-10-31 PROCEDURE — 77063 BREAST TOMOSYNTHESIS BI: CPT | Mod: 26,,, | Performed by: RADIOLOGY

## 2023-10-31 PROCEDURE — 77067 SCR MAMMO BI INCL CAD: CPT | Mod: TC

## 2023-10-31 PROCEDURE — 99214 OFFICE O/P EST MOD 30 MIN: CPT | Mod: S$PBB,,, | Performed by: NURSE PRACTITIONER

## 2023-12-05 DIAGNOSIS — L71.9 ROSACEA: ICD-10-CM

## 2023-12-05 RX ORDER — METRONIDAZOLE 7.5 MG/G
GEL TOPICAL 2 TIMES DAILY
Qty: 45 G | Refills: 1 | Status: SHIPPED | OUTPATIENT
Start: 2023-12-05 | End: 2024-03-11 | Stop reason: SDUPTHER

## 2023-12-05 RX ORDER — SERTRALINE HYDROCHLORIDE 50 MG/1
50 TABLET, FILM COATED ORAL DAILY
Qty: 90 TABLET | Refills: 0 | Status: SHIPPED | OUTPATIENT
Start: 2023-12-05 | End: 2024-03-11 | Stop reason: SDUPTHER

## 2023-12-18 ENCOUNTER — OFFICE VISIT (OUTPATIENT)
Dept: FAMILY MEDICINE | Facility: CLINIC | Age: 78
End: 2023-12-18
Payer: MEDICARE

## 2023-12-18 VITALS
TEMPERATURE: 98 F | BODY MASS INDEX: 45.68 KG/M2 | RESPIRATION RATE: 18 BRPM | HEIGHT: 55 IN | HEART RATE: 68 BPM | SYSTOLIC BLOOD PRESSURE: 143 MMHG | DIASTOLIC BLOOD PRESSURE: 74 MMHG | WEIGHT: 197.38 LBS | OXYGEN SATURATION: 96 %

## 2023-12-18 DIAGNOSIS — R13.10 DYSPHAGIA, UNSPECIFIED TYPE: Primary | ICD-10-CM

## 2023-12-18 DIAGNOSIS — J30.2 SEASONAL ALLERGIC RHINITIS, UNSPECIFIED TRIGGER: ICD-10-CM

## 2023-12-18 DIAGNOSIS — R42 DIZZINESS: ICD-10-CM

## 2023-12-18 PROCEDURE — 99215 OFFICE O/P EST HI 40 MIN: CPT | Mod: PBBFAC

## 2023-12-18 RX ORDER — LORATADINE 10 MG/1
10 TABLET ORAL DAILY
Qty: 30 TABLET | Refills: 1 | Status: SHIPPED | OUTPATIENT
Start: 2023-12-18 | End: 2024-03-11 | Stop reason: SDUPTHER

## 2023-12-18 NOTE — PROGRESS NOTES
"[facility] [place]  Nu Denny  [id number]  12/18/2023    Chief Complaint   Patient presents with    Dizziness     Experiencing dizziness since before thanksgiving             Nu Denny is a 78 y.o. female with PMH of HTN, HLD, CRVO right eye, and osteopenia presenting to [facility] for dizziness and feeling like "something is stuck in throat."    Acute concerns:  Dizziness positional, intermittent x 3 weeks. Denies N/V, worsening vision, weakness. Prior incident in 2020 treated with an unknown medication.    Sensation of Something in throat x 4 months, not painful, does not impact ability to eat or drink. Leaves sour taste in mouth overnight. Hx of small esophageal diverticulum seen on esophogram 9/2015.    Chronic concerns:    Allergic rhinitis: runny nose, sneezing, congestion, clear sputum producing cough x 3 weeks with weather change. Taking Zyrtec 10mg daily. No recent sick contacts  Review of Systems  As per hpi    Vitals:    12/18/23 0907   BP: (!) 143/74   Pulse: 68   Resp: 18   Temp: 97.6 °F (36.4 °C)   TempSrc: Oral   SpO2: 96%   Weight: 89.5 kg (197 lb 6.4 oz)   Height: (!) 5" (0.127 m)      Physical Exam  General: pleasant well appearing  HENT: moist mucous membranes, uvula midline, tonsils surgically removed, no cobblestoning, no erythema  Neck: tenderness to palpation of left lateral laryngeal region, thyroid non tender, no palpable nodules. No cervical lymphadenopathy  Cardio: regular rate and rhythm, no murmurs, no carotid bruits  Pulm: lung clear to auscultation bilaterally  Neuro: CN III-XI intact, coordinated gait, facial symmetry intact    Current Medications:   Current Outpatient Medications   Medication Sig Dispense Refill    acetaminophen (TYLENOL) 650 MG TbSR Take 650 mg by mouth every 8 (eight) hours.      APPLE CIDER VINEGAR ORAL Take by mouth.      aspirin (ECOTRIN) 81 MG EC tablet Take 81 mg by mouth once daily.      atorvastatin (LIPITOR) 20 MG tablet Take 1 tablet (20 mg " total) by mouth once daily. 90 tablet 0    b complex vitamins capsule Take 1 capsule by mouth once daily.      calcium carbonate (OS-SREEDHAR) 500 mg calcium (1,250 mg) chewable tablet Take 1 tablet by mouth once daily.      cetirizine 10 mg chewable tablet Take 10 mg by mouth once daily.      diclofenac sodium (VOLTAREN) 1 % Gel Apply 2 g topically 4 (four) times daily. PRN for pain (Patient not taking: Reported on 9/18/2023) 100 g 0    docusate sodium (COLACE) 100 MG capsule Take 100 mg by mouth once daily at 6am.      famotidine (PEPCID) 20 MG tablet Take 1 tablet (20 mg total) by mouth 2 (two) times daily. 180 tablet 0    ibuprofen (ADVIL,MOTRIN) 200 MG tablet Take 400 mg by mouth every 6 (six) hours as needed for Pain. 3 x week alternate with Tylenol      loratadine (CLARITIN) 10 mg tablet Take 1 tablet (10 mg total) by mouth once daily. 30 tablet 1    losartan (COZAAR) 50 MG tablet Take 1 tablet (50 mg total) by mouth once daily. 90 tablet 0    methylcellulose oral powder Take 3.4 g by mouth once daily.      metroNIDAZOLE (METROGEL) 0.75 % gel Apply topically 2 (two) times daily. Apply to face PRN for rosacea related rash 45 g 1    multivitamin capsule Take 1 capsule by mouth once daily.      sertraline (ZOLOFT) 50 MG tablet Take 1 tablet (50 mg total) by mouth once daily. 90 tablet 0    vitamin D (VITAMIN D3) 1000 units Tab Take 1,000 Units by mouth once daily.       Current Facility-Administered Medications   Medication Dose Route Frequency Provider Last Rate Last Admin    sodium chloride 0.9% flush 10 mL  10 mL Intravenous PRN Kaveh Morales MD           Assessment:   Dysphagia, unspecified type  CT Neck Chest Without Contrast ordered  Enlargement of diverticula vs thyroid vs CVA  Hx of diverticula of esophagus seen 2015  Previous TSH wnl, no other symptoms of thyroid dysfunction  No other neurological symptoms of CVA    Dizziness  Orthostatic hypotension vs Vestibular dysfunction  Orthostatic vital signs  performed and no complain of dizziness with forward gaze at any position  Followed by ENT will defer for additional vestibular etiology rule out    Allergic rhinitis  Possibly contributing to dizziness  Stop zyrtec, Start loratadine. Patient decline intranasal spray due to intolerance      Orders Placed This Encounter    CT Neck Chest Without Contrast (XPD)    Orthostatic vital signs    loratadine (CLARITIN) 10 mg tablet       Return to clinic in 2 months, or sooner if needed.     Carey Gonzalez DO  [employer]  Resident, HO-1

## 2023-12-19 NOTE — PROGRESS NOTES
"Discussed with resident at time of encounter 12-18-23.  Pt. evaluated.  Sensation in throat area.  Dizziness; positional; previous episode; no nausea / vomiting.  No motor weakness.    PE  Gen: NAD, ambulatory without gait abn.  HEENT: no facial asymmetry / nystagmus; no carotid bruit / thyromegaly; no appreciable neck masses.  TM's clear bilaterally, no effusions.  Neuro: no gross motor / cerebellar deficits.    Resident's note reviewed 12-19-23.  Patient's height incorrectly recorded (5").  Agree with assessment; plan of care appropriate.  Recommend CT of neck.  Close follow-up.  Precautions.  Professional services provided in an outpatient primary care center affiliated with a teaching institution.    "

## 2024-01-19 ENCOUNTER — OFFICE VISIT (OUTPATIENT)
Dept: OPHTHALMOLOGY | Facility: CLINIC | Age: 79
End: 2024-01-19
Payer: MEDICARE

## 2024-01-19 VITALS — BODY MASS INDEX: 5540.25 KG/M2 | WEIGHT: 197 LBS

## 2024-01-19 DIAGNOSIS — H04.123 DRY EYE SYNDROME OF BOTH EYES: ICD-10-CM

## 2024-01-19 DIAGNOSIS — H25.12 AGE-RELATED NUCLEAR CATARACT OF LEFT EYE: ICD-10-CM

## 2024-01-19 DIAGNOSIS — Z98.41 CATARACT EXTRACTION STATUS OF EYE, RIGHT: ICD-10-CM

## 2024-01-19 DIAGNOSIS — H34.8110 CENTRAL RETINAL VEIN OCCLUSION WITH MACULAR EDEMA OF RIGHT EYE: Primary | ICD-10-CM

## 2024-01-19 PROCEDURE — 92134 CPTRZ OPH DX IMG PST SGM RTA: CPT | Mod: PBBFAC,PN | Performed by: STUDENT IN AN ORGANIZED HEALTH CARE EDUCATION/TRAINING PROGRAM

## 2024-01-19 PROCEDURE — 92250 FUNDUS PHOTOGRAPHY W/I&R: CPT | Mod: PBBFAC,59,PN | Performed by: OPHTHALMOLOGY

## 2024-01-19 PROCEDURE — 99213 OFFICE O/P EST LOW 20 MIN: CPT | Mod: PBBFAC,PN | Performed by: STUDENT IN AN ORGANIZED HEALTH CARE EDUCATION/TRAINING PROGRAM

## 2024-01-19 PROCEDURE — 92134 CPTRZ OPH DX IMG PST SGM RTA: CPT | Mod: PBBFAC,PN | Performed by: OPHTHALMOLOGY

## 2024-01-19 PROCEDURE — 92250 FUNDUS PHOTOGRAPHY W/I&R: CPT | Mod: PBBFAC,59,PN | Performed by: STUDENT IN AN ORGANIZED HEALTH CARE EDUCATION/TRAINING PROGRAM

## 2024-01-19 NOTE — PROGRESS NOTES
HPI    RTC 4 months dfe, oct mac, NVD check OD  Patient would like an updated MRX today if possible   Patient states that she feels like her vision is worse at times   Last edited by Candie Smith MA on 1/19/2024  9:59 AM.            Assessment /Plan     For exam results, see Encounter Report.    Central retinal vein occlusion with macular edema of right eye    Dry eye syndrome of both eyes    Cataract extraction status of eye, right    Age-related nuclear cataract of left eye           OCT Mac 1/19/24  OD: 400 um, large cystic IRF  OS: 339 um, stable ERM, no IRF/SRF     IVFA 11/30/22: overexposed image ou, prp scars and expected disc staining. No leakage ou     ==================    1) CRVO OD with CME  - Unclear timing although documented 10/2019  - IVFA unable to be done due to poor venous access  - Carotid showed <50% stenosis OU.  - Lasers: PRP OD 2/16/22, with fill in 03/16/22, 7/20/22  INJECTION LOG  - Avastin (10/2019)- no response, ARYA 2 (7/14/2020) - poor response. 1/31/22 for NVD  - Ozurdex (12/4/2019) - worked great previously  - Eylea 3/3 (8/19/2020, 9/17/2020, 10/16/20  - Plan most of 2020 was to proceed with ozurdex injection if no improvement in mac edema. After discussion with Dr. Greer, does not think ozurdex would provide significant benefit over triessence. Also, there is concern that vein occlusion has worsened given lack of improvement and significant worsening of edema and vision over past 5 months at end of 2020.  - Patient elected to stop injections in 2020 given she has not had significant vision changes with injections  - Exam 1/31/22 New NVD & frond of NVE inferotemporally.   - 11/15/22; no NVI or NVA; odd NVD vs collaterals noted OD.  Needs IVFA prior to continued injections or laser.  IOP and vision stable  - IVFA 11/30/22: overexposed image but no leakage ou. Will start spacing out visits as long as she is stable  - 1/19/24: Still with stable OCT macula. Stable vision. Stable dilated  examination. Continue q4 month examinations.     2) Age-related nuclear cataract, left eye  - High threshold for surgery given functionally monocular, cleared by Retina for surgery  - Last MRX to 20/30 1/19/24 (polycarb lenses)  - Monitor    3) ERM, OU  - H/o ERM OD s/p 25g PPV, ERM/ILM peel with FAx 5/23/17. Stable  - H/o ERM OS, NVS, OCT mac stable  - Monitor    4) Pseudophakia of right eye  - Done 4/24/12 by Dr. Little, SN60WF 18.5D  - Monitor    5) Posterior vitreous detachment, left eye  - No holes/tears/breaks on DFE  - RD precautions    6) Dry eye syndrome of both lacrimal glands  - AT 4-6x/day as needed, lid hygiene  - No complaints today    RTC 4 months DFE, OCT, NVD check OD

## 2024-01-23 ENCOUNTER — HOSPITAL ENCOUNTER (OUTPATIENT)
Dept: RADIOLOGY | Facility: HOSPITAL | Age: 79
Discharge: HOME | End: 2024-01-23
Payer: MEDICARE

## 2024-01-23 DIAGNOSIS — R13.10 DYSPHAGIA, UNSPECIFIED TYPE: ICD-10-CM

## 2024-01-23 PROCEDURE — 71250 CT THORAX DX C-: CPT | Mod: TC

## 2024-01-23 PROCEDURE — 70490 CT SOFT TISSUE NECK W/O DYE: CPT | Mod: TC

## 2024-02-04 ENCOUNTER — HOSPITAL ENCOUNTER (EMERGENCY)
Facility: HOSPITAL | Age: 79
Discharge: HOME | End: 2024-02-04
Attending: STUDENT IN AN ORGANIZED HEALTH CARE EDUCATION/TRAINING PROGRAM
Payer: MEDICARE

## 2024-02-04 VITALS
TEMPERATURE: 98 F | HEART RATE: 84 BPM | DIASTOLIC BLOOD PRESSURE: 95 MMHG | RESPIRATION RATE: 18 BRPM | SYSTOLIC BLOOD PRESSURE: 176 MMHG

## 2024-02-04 DIAGNOSIS — S00.03XD HEMATOMA OF FRONTAL SCALP, SUBSEQUENT ENCOUNTER: Primary | ICD-10-CM

## 2024-02-04 PROCEDURE — 99281 EMR DPT VST MAYX REQ PHY/QHP: CPT

## 2024-02-04 NOTE — ED PROVIDER NOTES
"Encounter Date: 2/4/2024       History     Chief Complaint   Patient presents with    Facial Swelling     Pt had trip and fall on Friday and was seen at [facility]. States head ct and x-rays were done. Awoke with morning with right periorbital bruising that "scared me". No increased pain. 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 No new pain anywhere. Did not take bp meds yet today.     Patient presents to the emergency department due to right-sided facial swelling.  Two days ago patient had a fall and head injury, he was seen at outside facility, had a CT scan performed of her head it was negative for any acute intracranial process.  Since going home she was had a mild dull headache.  This morning she woke up and noticed bruising around her right eye.  She states initially in the bruising was higher up.  She has had no nausea or vomiting, no changes in her vision.    The history is provided by the patient.     Review of patient's allergies indicates:   Allergen Reactions    Codeine Rash     Past Medical History:   Diagnosis Date    Anxiety 06/13/2022    Cataract of left eye 06/13/2022    Central retinal vein occlusion of right eye 06/13/2022    Female bladder prolapse 06/13/2022    Gastroesophageal reflux disease 06/13/2022    Hyperlipidemia 06/13/2022    Hypertension 06/13/2022    Mild CAD     Osteopenia of neck of femur 06/13/2022    Urinary incontinence 06/13/2022    Zenker diverticulum      Past Surgical History:   Procedure Laterality Date    ANGIOGRAM, CORONARY, WITH LEFT HEART CATHETERIZATION N/A 6/28/2023    Procedure: Angiogram, Coronary, with Left Heart Cath;  Surgeon: Kaveh Morales MD;  Location: [facility] CATH LAB;  Service: Cardiology;  Laterality: N/A;    CHOLECYSTECTOMY      COLONOSCOPY  2017    HYSTERECTOMY      KNEE SURGERY Right     x2; states MD wanted to do replacement but pt declined    SHOULDER SURGERY  1995    TONSILLECTOMY      VITRECTOMY       Family History   Problem Relation Age of Onset    Hypertension " Mother     No Known Problems Father     No Known Problems Sister     No Known Problems Brother     No Known Problems Maternal Aunt     No Known Problems Maternal Uncle     No Known Problems Paternal Aunt     No Known Problems Paternal Uncle     Diabetes Maternal Grandmother     No Known Problems Maternal Grandfather     No Known Problems Paternal Grandmother     No Known Problems Paternal Grandfather     Amblyopia Neg Hx     Blindness Neg Hx     Cancer Neg Hx     Cataracts Neg Hx     Glaucoma Neg Hx     Macular degeneration Neg Hx     Retinal detachment Neg Hx     Strabismus Neg Hx     Stroke Neg Hx     Thyroid disease Neg Hx      Social History     Tobacco Use    Smoking status: Never     Passive exposure: Past    Smokeless tobacco: Never   Substance Use Topics    Alcohol use: Never    Drug use: Never     Review of Systems   Constitutional:  Negative for chills and fever.   HENT:  Negative for congestion and sore throat.    Respiratory:  Negative for cough and shortness of breath.    Cardiovascular:  Negative for chest pain and palpitations.   Gastrointestinal:  Negative for abdominal pain and nausea.   Genitourinary:  Negative for dysuria and hematuria.   Musculoskeletal:  Negative for arthralgias and myalgias.   Neurological:  Negative for dizziness and weakness.       Physical Exam     Initial Vitals [02/04/24 1241]   BP Pulse Resp Temp SpO2   (!) 184/101 78 18 97.5 °F (36.4 °C) --      MAP       --         Physical Exam    Nursing note and vitals reviewed.  Constitutional: She appears well-developed and well-nourished.   HENT:   Head: Normocephalic.   Ecchymosis around right eye and mild swelling   Eyes: Conjunctivae and EOM are normal. Pupils are equal, round, and reactive to light.   Neck: Neck supple.   Normal range of motion.  Cardiovascular:  Normal rate and regular rhythm.           Pulmonary/Chest: Breath sounds normal. No respiratory distress.   Abdominal: Abdomen is soft. There is no abdominal  tenderness.   Musculoskeletal:         General: No edema. Normal range of motion.      Cervical back: Normal range of motion and neck supple.     Neurological: She is alert and oriented to person, place, and time.   Skin: Skin is warm and dry.         ED Course   Procedures  Labs Reviewed - No data to display       Imaging Results    None          Medications - No data to display  Medical Decision Making  Exam today is consistent with bruising that it was now starting to progress down her face, with no evidence of new injuries.  Extraocular movements are intact, the right eye itself appears unremarkable, and patient was no symptoms of worsening head injury.  Normal expected course of patients hematoma, will discharge.                                      Clinical Impression:  Final diagnoses:  [S00.03XD] Hematoma of frontal scalp, subsequent encounter (Primary)          ED Disposition Condition    Discharge Stable          ED Prescriptions    None       Follow-up Information       Follow up With Specialties Details Why Contact Info    [facility] Emergency Medicine Go to  If symptoms worsen [address] [facility] [id number] [phone]    Carey Gonzalez,  Family Medicine Go to  As needed [address] [facility] [address]  [phone]               Aiden Muse MD  03/05/24 [id number]

## 2024-02-08 ENCOUNTER — OFFICE VISIT (OUTPATIENT)
Dept: FAMILY MEDICINE | Facility: CLINIC | Age: 79
End: 2024-02-08
Payer: MEDICARE

## 2024-02-08 VITALS
BODY MASS INDEX: 38.68 KG/M2 | OXYGEN SATURATION: 98 % | RESPIRATION RATE: 20 BRPM | HEIGHT: 60 IN | DIASTOLIC BLOOD PRESSURE: 80 MMHG | TEMPERATURE: 97 F | HEART RATE: 79 BPM | SYSTOLIC BLOOD PRESSURE: 152 MMHG | WEIGHT: 197 LBS

## 2024-02-08 DIAGNOSIS — I10 PRIMARY HYPERTENSION: ICD-10-CM

## 2024-02-08 DIAGNOSIS — E78.2 MIXED HYPERLIPIDEMIA: ICD-10-CM
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
## 2024-02-08 DIAGNOSIS — K21.9 GASTROESOPHAGEAL REFLUX DISEASE, UNSPECIFIED WHETHER ESOPHAGITIS PRESENT: ICD-10-CM

## 2024-02-08 DIAGNOSIS — M85.80 OSTEOPENIA, UNSPECIFIED LOCATION: ICD-10-CM

## 2024-02-08 PROCEDURE — 96372 THER/PROPH/DIAG INJ SC/IM: CPT | Mod: PBBFAC

## 2024-02-08 PROCEDURE — 99215 OFFICE O/P EST HI 40 MIN: CPT | Mod: PBBFAC

## 2024-02-08 RX ORDER — ATORVASTATIN CALCIUM 20 MG/1
20 TABLET, FILM COATED ORAL DAILY
Qty: 90 TABLET | Refills: 0 | Status: SHIPPED | OUTPATIENT
Start: 2024-02-08 | End: 2024-03-11 | Stop reason: SDUPTHER

## 2024-02-08 RX ORDER — FAMOTIDINE 20 MG/1
20 TABLET, FILM COATED ORAL 2 TIMES DAILY
Qty: 180 TABLET | Refills: 0 | Status: SHIPPED | OUTPATIENT
Start: 2024-02-08 | End: 2024-03-11 | Stop reason: SDUPTHER

## 2024-02-08 RX ORDER — ATORVASTATIN CALCIUM 20 MG/1
20 TABLET, FILM COATED ORAL DAILY
Qty: 90 TABLET | Refills: 0 | Status: CANCELLED | OUTPATIENT
Start: 2024-02-08 | End: 2024-05-08

## 2024-02-08 RX ORDER — LOSARTAN POTASSIUM 50 MG/1
50 TABLET ORAL DAILY
Qty: 90 TABLET | Refills: 0 | Status: CANCELLED | OUTPATIENT
Start: 2024-02-08 | End: 2024-05-08

## 2024-02-08 RX ORDER — LOSARTAN POTASSIUM 50 MG/1
50 TABLET ORAL DAILY
Qty: 90 TABLET | Refills: 0 | Status: SHIPPED | OUTPATIENT
Start: 2024-02-08 | End: 2024-03-11 | Stop reason: SDUPTHER

## 2024-02-08 RX ORDER — FAMOTIDINE 20 MG/1
20 TABLET, FILM COATED ORAL 2 TIMES DAILY
Qty: 180 TABLET | Refills: 0 | Status: CANCELLED | OUTPATIENT
Start: 2024-02-08 | End: 2024-05-08

## 2024-02-08 RX ADMIN — DENOSUMAB 60 MG: 60 INJECTION SUBCUTANEOUS at 11:02

## 2024-02-09 NOTE — PROGRESS NOTES
[facility] [place]  Nu Denny  [id number]  02/08/2024    Chief Complaint   Patient presents with    Fall     Fell on Friday has bruising on right arm and bruising to the frontal scalp     Medication Refill     Atorvastatin   Losartan   Famotidine         Nu Denny is a 78 y.o. female  presenting to [facility] for ED discharge follow up. Patient fell on 2/2/24 while walking in home with open toe slippers and slipper catching on the edge of a vent in her mobile home. Her head hit the nearest window sill, she fell onto her right knee. No LOC called daughter and son transported her to [facility]. Imaging was negative for brain hemorrhage and fractures. Bruising worsened on 2/4/24 and patient was transported to [facility] ED with concern for extent of bruising, swelling, and scarring.    Interval history: Reports persistent dull headache since fall. Reports some what similar to her typical migraines but only resolved with use of BC powder. Denies vision changes, brain fog, non-intractable headache, change in cognitive function or change in neurological status.      Review of Systems  As per hpi    Vitals:    02/08/24 0946   BP: (!) 152/80   Pulse: 79   Resp: 20   Temp: 97.3 °F (36.3 °C)   TempSrc: Oral   SpO2: 98%   Weight: 89.4 kg (197 lb)   Height: 5' (1.524 m)      Physical Exam  General: alert, NAD  HENT pupils reactive to light  Cardio: regular rate and rhythm  Pulm: lungs clear to auscultation bilaterally  Skin: 3.5 cm x 4 cm superficial hematoma to right forehead near hairline with small scabbed over healing area over the center. No bleeding. Bruising to right under eye, and right ventral forearm.  Neuro: ambulating without issue, complete visual field defect in right eye- chronic    Current Medications:   Current Outpatient Medications   Medication Sig Dispense Refill    acetaminophen (TYLENOL) 650 MG TbSR Take 650 mg by mouth every 8 (eight) hours.      APPLE CIDER VINEGAR ORAL Take by  mouth.      aspirin (ECOTRIN) 81 MG EC tablet Take 81 mg by mouth once daily.      atorvastatin (LIPITOR) 20 MG tablet Take 1 tablet (20 mg total) by mouth once daily. 90 tablet 0    b complex vitamins capsule Take 1 capsule by mouth once daily.      calcium carbonate (OS-SREEDHAR) 500 mg calcium (1,250 mg) chewable tablet Take 1 tablet by mouth once daily.      cetirizine 10 mg chewable tablet Take 10 mg by mouth once daily.      diclofenac sodium (VOLTAREN) 1 % Gel Apply 2 g topically 4 (four) times daily. PRN for pain (Patient not taking: Reported on 9/18/2023) 100 g 0    docusate sodium (COLACE) 100 MG capsule Take 100 mg by mouth once daily at 6am.      famotidine (PEPCID) 20 MG tablet Take 1 tablet (20 mg total) by mouth 2 (two) times daily. 180 tablet 0    ibuprofen (ADVIL,MOTRIN) 200 MG tablet Take 400 mg by mouth every 6 (six) hours as needed for Pain. 3 x week alternate with Tylenol      loratadine (CLARITIN) 10 mg tablet Take 1 tablet (10 mg total) by mouth once daily. 30 tablet 1    losartan (COZAAR) 50 MG tablet Take 1 tablet (50 mg total) by mouth once daily. 90 tablet 0    methylcellulose oral powder Take 3.4 g by mouth once daily.      metroNIDAZOLE (METROGEL) 0.75 % gel Apply topically 2 (two) times daily. Apply to face PRN for rosacea related rash 45 g 1    multivitamin capsule Take 1 capsule by mouth once daily.      sertraline (ZOLOFT) 50 MG tablet Take 1 tablet (50 mg total) by mouth once daily. 90 tablet 0    vitamin D (VITAMIN D3) 1000 units Tab Take 1,000 Units by mouth once daily.       Current Facility-Administered Medications   Medication Dose Route Frequency Provider Last Rate Last Admin    sodium chloride 0.9% flush 10 mL  10 mL Intravenous PRN Kaveh Morales MD           Assessment:   ED follow up for fall  Mixed hyperlipidemia  Gastroesophageal reflux disease, unspecified whether esophagitis present  Primary hypertension  Osteopenia, unspecified location  Fall precautions discussed in  depth; reviewed ED AVS for [facility] visit. Outside imaging negative for hemorrhage and fractures. ED note/finding from [facility] ED visit  Tylenol and ibuprofen for pain control, ice to bruise on right forehead to reduce swelling  Concussion precautions and rtc instructions given   Refilled below medications  Prolia injection give today due in 6 months     Orders Placed This Encounter    atorvastatin (LIPITOR) 20 MG tablet    famotidine (PEPCID) 20 MG tablet    losartan (COZAAR) 50 MG tablet    denosumab (PROLIA) injection 60 mg       Follow up in about 1 month (around 3/11/2024).      Carey Gonzalez DO  [employer]  Resident, HO-1

## 2024-02-20 NOTE — PROGRESS NOTES
Faculty addendum: Patient discussed with resident. Chart was reviewed including vitals, labs, etc. Care provided reasonable and necessary. I participated in the management of the patient and was immediately available throughout the encounter. Services were furnished in a primary care center located in the outpatient department of a [facility] hospital. I agree with the resident's findings and plan as documented in the resident's note.

## 2024-03-06 ENCOUNTER — CLINICAL SUPPORT (OUTPATIENT)
Dept: AUDIOLOGY | Facility: HOSPITAL | Age: 79
End: 2024-03-06
Payer: MEDICARE

## 2024-03-06 ENCOUNTER — OFFICE VISIT (OUTPATIENT)
Dept: OTOLARYNGOLOGY | Facility: CLINIC | Age: 79
End: 2024-03-06
Payer: MEDICAID

## 2024-03-06 VITALS — SYSTOLIC BLOOD PRESSURE: 136 MMHG | DIASTOLIC BLOOD PRESSURE: 80 MMHG | TEMPERATURE: 98 F | HEART RATE: 75 BPM

## 2024-03-06 DIAGNOSIS — H90.5 SENSORINEURAL HEARING LOSS (SNHL), UNSPECIFIED LATERALITY: ICD-10-CM

## 2024-03-06 DIAGNOSIS — M26.629 ARTHRALGIA OF TEMPOROMANDIBULAR JOINT, UNSPECIFIED LATERALITY: ICD-10-CM

## 2024-03-06 DIAGNOSIS — H90.5 SENSORINEURAL HEARING LOSS (SNHL), UNSPECIFIED LATERALITY: Primary | ICD-10-CM

## 2024-03-06 DIAGNOSIS — H90.3 SENSORINEURAL HEARING LOSS, BILATERAL: Primary | ICD-10-CM

## 2024-03-06 DIAGNOSIS — M79.18 MYOFASCIAL PAIN: ICD-10-CM

## 2024-03-06 PROCEDURE — 1125F AMNT PAIN NOTED PAIN PRSNT: CPT | Mod: CPTII,,, | Performed by: NURSE PRACTITIONER

## 2024-03-06 PROCEDURE — 99213 OFFICE O/P EST LOW 20 MIN: CPT | Mod: S$PBB,,, | Performed by: NURSE PRACTITIONER

## 2024-03-06 PROCEDURE — 1159F MED LIST DOCD IN RCRD: CPT | Mod: CPTII,,, | Performed by: NURSE PRACTITIONER

## 2024-03-06 PROCEDURE — 3288F FALL RISK ASSESSMENT DOCD: CPT | Mod: CPTII,,, | Performed by: NURSE PRACTITIONER

## 2024-03-06 PROCEDURE — 3079F DIAST BP 80-89 MM HG: CPT | Mod: CPTII,,, | Performed by: NURSE PRACTITIONER

## 2024-03-06 PROCEDURE — 99214 OFFICE O/P EST MOD 30 MIN: CPT | Mod: PBBFAC,25 | Performed by: NURSE PRACTITIONER

## 2024-03-06 PROCEDURE — 1100F PTFALLS ASSESS-DOCD GE2>/YR: CPT | Mod: CPTII,,, | Performed by: NURSE PRACTITIONER

## 2024-03-06 PROCEDURE — 3075F SYST BP GE 130 - 139MM HG: CPT | Mod: CPTII,,, | Performed by: NURSE PRACTITIONER

## 2024-03-06 PROCEDURE — 92557 COMPREHENSIVE HEARING TEST: CPT | Performed by: AUDIOLOGIST

## 2024-03-06 PROCEDURE — 92567 TYMPANOMETRY: CPT | Performed by: AUDIOLOGIST

## 2024-03-06 NOTE — PROGRESS NOTES
Hearing Evaluation        Patient History: Mrs. Denny has a long-standing hearing loss reporting no changes in hearing since previous evaluation. Tinnitus and middle ear issues are denied. Vertiginous episodes are reportedly getting better. All additional history is unremarkable.        Test Results:                    Pure Tone Testing:      Right ear:       Mild to moderate sensorineural hearing loss from 250-8kHz. Speech reception threshold is in agreement with puretone findings.  Discrimination score of 96% is considered excellent.        Left ear:          Mild to moderate sensorineural hearing loss from 250-8kHz. Speech reception threshold is in agreement with puretone findings.  Discrimination score of 100% is considered excellent.                                                                            Tympanometry:                                           Right ear:       Type 'A' tymp, normal middle ear pressure/function     Left ear:          Type 'A' tymp, normal middle ear pressure/function          Interpretations:      Behavioral test findings indicate no significant changes in hearing since previous hearing evaluation. Speech reception thresholds obtained at 45dB, AU, and are in agreement with puretone findings. Speech discrimination scores of 96%, AD and 100%, AS, are considered excellent.  Immittance measures indicate normal middle ear pressure/function, bilaterally.            Recommendations:   Continue with ENT follow up  Annual hearing evaluations       DISCHARGE

## 2024-03-06 NOTE — PROGRESS NOTES
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 She says that the dizziness rather suddenly after she had a trip to the emergency room in August for fever and was found to have UTI and bacteremia. She describes the dizziness as a feeling of lightheadedness where she feels she may pass out; she does not experience other symptoms such as sweating or heart palpitations during these episodes. She has not actually passed out however. These symptoms occur for a few seconds and are always provoked by positional changes such as leaning over to tie her shoes, rolling over in bed, or sitting up from bed. She uses a pincer to reach for things on the ground and takes her time to sit up to avoid her dizziness symptoms    12/21/20: No c/o. Reports she is no longer having any dizzy episodes. Feels they were r/t her eye injections which she d/c. Vestibular battery WNL. Audiogram stable.    11/16/2022: C/o otalgia R>L x 1.5mo. She denies any change in hearing, tinnitus, dizziness, or change in otologic hx. She has recently been treated for neck pain and disc issues and is still having symptoms. Denies nasal congestion or rhinitis.     2/7/23: Doing well. States she is no longer having otalgia since neck pain has improved.     3/6/24: Denies otologic c/o. Had a recent fall and hit her head. She was evaluated in the ED and is improving.    ROS:   10-point review of systems negative except per HPI      Review of patient's allergies indicates:   Allergen Reactions    Codeine Rash       Past Medical History:   Diagnosis Date    Anxiety 06/13/2022    Cataract of left eye 06/13/2022    Central retinal vein occlusion of right eye 06/13/2022    Female bladder prolapse 06/13/2022    Gastroesophageal reflux  disease 06/13/2022    Hyperlipidemia 06/13/2022    Hypertension 06/13/2022    Mild CAD     Osteopenia of neck of femur 06/13/2022    Urinary incontinence 06/13/2022    Zenker diverticulum        Past Surgical History:   Procedure Laterality Date    CHOLECYSTECTOMY      COLONOSCOPY  2017    HYSTERECTOMY      KNEE SURGERY Right     x2; states MD wanted to do replacement but pt declined    SHOULDER SURGERY  1995    TONSILLECTOMY      VITRECTOMY         Social History     Socioeconomic History    Marital status:    Tobacco Use    Smoking status: Never    Smokeless tobacco: Never   Substance and Sexual Activity    Alcohol use: Never    Drug use: Never       Family History   Problem Relation Age of Onset    Hypertension Mother     No Known Problems Father     No Known Problems Sister     No Known Problems Brother     No Known Problems Maternal Aunt     No Known Problems Maternal Uncle     No Known Problems Paternal Aunt     No Known Problems Paternal Uncle     Diabetes Maternal Grandmother     No Known Problems Maternal Grandfather     No Known Problems Paternal Grandmother     No Known Problems Paternal Grandfather     Amblyopia Neg Hx     Blindness Neg Hx     Cancer Neg Hx     Cataracts Neg Hx     Glaucoma Neg Hx     Macular degeneration Neg Hx     Retinal detachment Neg Hx     Strabismus Neg Hx     Stroke Neg Hx     Thyroid disease Neg Hx        Outpatient Encounter Medications as of 11/16/2022   Medication Sig Dispense Refill    aspirin (ECOTRIN) 81 MG EC tablet Take 81 mg by mouth once daily.      atorvastatin (LIPITOR) 20 MG tablet Take 1 tablet (20 mg total) by mouth once daily. 90 tablet 0    b complex vitamins capsule Take 1 capsule by mouth once daily.      calcium carbonate (OS-SREEDHAR) 500 mg calcium (1,250 mg) chewable tablet Take 1 tablet by mouth once daily.      docusate sodium (COLACE) 100 MG capsule Take 100 mg by mouth once daily at 6am.      famotidine (PEPCID) 20 MG tablet Take 1 tablet (20 mg  "total) by mouth 2 (two) times daily. 180 tablet 0    losartan (COZAAR) 50 MG tablet Take 1 tablet (50 mg total) by mouth once daily. 90 tablet 0    methylcellulose oral powder Take 3.4 g by mouth once daily.      metroNIDAZOLE (METROGEL) 0.75 % gel Apply topically 2 (two) times daily. Apply to face PRN for rosacea related rash 45 g 1    multivitamin capsule Take 1 capsule by mouth once daily.      sertraline (ZOLOFT) 25 MG tablet Take 2 tablets (50 mg total) by mouth once daily. 60 tablet 5    sertraline (ZOLOFT) 50 MG tablet Take 50 mg by mouth once daily.      vitamin D (VITAMIN D3) 1000 units Tab Take 1,000 Units by mouth once daily.       No facility-administered encounter medications on file as of 11/16/2022.       Physical Exam:  Vitals:    11/16/22 1126   BP: (!) 144/86   BP Location: Right arm   Patient Position: Sitting   BP Method: Large (Manual)   Temp: 98.2 °F (36.8 °C)   TempSrc: Oral   Weight: 88.5 kg (195 lb)   Height: 5' 2.01" (1.575 m)       General: NAD, voice normal  Neuro: AAO, CN II - XII grossly intact  Head/ Face: NCAT, symmetric, sensations intact bilaterally  Eyes: EOMI, PERRL  Ears: externally normal with grossly normal hearing  AD: EAC patent, TM intact, no middle ear effusion, no retractions  AS: EAC patent, TM intact, no middle ear effusion, no retractions  Nose: bilateral nares patent, midline septum, no rhinorrhea, no external deformity, no turbinate hypertrophy  OC/OP: MMM, no intraoral lesions, no trismus, dentition is poor (edentulous upper), no uvular deviation, bilaterally symmetric soft palate elevation, palatoglossus and palatopharyngeal fold wnl; tonsils are symmetric/absent. Marked pterygoid TTP R>L  Indirect laryngoscopy: deferred due to patient intolerance  Neck: tight musculature with reported tenderness to lateral/posterior palpation & movement, supple, no LAD, no thyromegaly  Respiratory: nonlabored, no wheezing, bilateral chest rise  Cardiovascular: " RRR  Gastrointestinal: S NT ND  Skin: warm, no lesions  Musculoskeletal: 5/5 strength  Psych: Appropriate affect/mood     Pertinent Data:  ? LABS:  ? AUDIO:               ? PATH:      Imaging:   I personally reviewed the following images:        Assessment/Plan:  77 y.o. female with otalgia 2/2 myofascial pain, resolved. Hx of b/l SNHL which is stable- reviewed.   - Annual audio  - RTC 1 year following audio. Ok for telemed if preferred    Nicole Upton NP

## 2024-03-11 ENCOUNTER — OFFICE VISIT (OUTPATIENT)
Dept: FAMILY MEDICINE | Facility: CLINIC | Age: 79
End: 2024-03-11
Payer: MEDICARE

## 2024-03-11 VITALS
BODY MASS INDEX: 37.93 KG/M2 | HEIGHT: 60 IN | TEMPERATURE: 98 F | OXYGEN SATURATION: 99 % | RESPIRATION RATE: 18 BRPM | WEIGHT: 193.19 LBS | HEART RATE: 85 BPM | DIASTOLIC BLOOD PRESSURE: 79 MMHG | SYSTOLIC BLOOD PRESSURE: 134 MMHG

## 2024-03-11 DIAGNOSIS — L71.9 ROSACEA: ICD-10-CM
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
## 2024-03-11 DIAGNOSIS — K21.9 GASTROESOPHAGEAL REFLUX DISEASE, UNSPECIFIED WHETHER ESOPHAGITIS PRESENT: ICD-10-CM

## 2024-03-11 DIAGNOSIS — I10 PRIMARY HYPERTENSION: ICD-10-CM

## 2024-03-11 DIAGNOSIS — M85.859 OSTEOPENIA OF NECK OF FEMUR, UNSPECIFIED LATERALITY: ICD-10-CM

## 2024-03-11 DIAGNOSIS — E78.2 MIXED HYPERLIPIDEMIA: ICD-10-CM

## 2024-03-11 PROCEDURE — 99215 OFFICE O/P EST HI 40 MIN: CPT | Mod: PBBFAC

## 2024-03-11 RX ORDER — LORATADINE 10 MG/1
10 TABLET ORAL DAILY
Qty: 30 TABLET | Refills: 2 | Status: SHIPPED | OUTPATIENT
Start: 2024-03-11

## 2024-03-11 RX ORDER — SERTRALINE HYDROCHLORIDE 50 MG/1
50 TABLET, FILM COATED ORAL DAILY
Qty: 90 TABLET | Refills: 0 | Status: SHIPPED | OUTPATIENT
Start: 2024-03-11 | End: 2024-06-09

## 2024-03-11 RX ORDER — LOSARTAN POTASSIUM 50 MG/1
50 TABLET ORAL DAILY
Qty: 90 TABLET | Refills: 0 | Status: SHIPPED | OUTPATIENT
Start: 2024-03-11 | End: 2024-04-30 | Stop reason: SDUPTHER

## 2024-03-11 RX ORDER — FAMOTIDINE 20 MG/1
20 TABLET, FILM COATED ORAL 2 TIMES DAILY
Qty: 180 TABLET | Refills: 0 | Status: SHIPPED | OUTPATIENT
Start: 2024-03-11

## 2024-03-11 RX ORDER — METRONIDAZOLE 7.5 MG/G
GEL TOPICAL 2 TIMES DAILY
Qty: 45 G | Refills: 1 | Status: SHIPPED | OUTPATIENT
Start: 2024-03-11 | End: 2025-03-11

## 2024-03-11 RX ORDER — ATORVASTATIN CALCIUM 20 MG/1
20 TABLET, FILM COATED ORAL DAILY
Qty: 90 TABLET | Refills: 0 | Status: SHIPPED | OUTPATIENT
Start: 2024-03-11 | End: 2024-04-30 | Stop reason: SDUPTHER

## 2024-03-11 NOTE — PROGRESS NOTES
[facility] OFFICE VISIT NOTE  Nu Denny  [id number]  03/11/2024    Chief Complaint   Patient presents with    Follow-up     1 month follow up    Medication Refill     Loratadine 10mg, metronidazole (topical) 0.75%, sertraline 50mg       Nu Denny is a 78 y.o. female  presenting to [facility] for follow up after fall and medication refill.    HPI  Acute concerns:    Fall: fall on 2/2/24 with bruising to right hairline, right arm and right eye. Denies headache, memory loss, nausea or vomiting. Family has removed all fall hazards from home.      Health Maintenance  Optho: next appt 5/23/2024, for central renal vein occlusion.   Cardiology: next appt 4/3/2024, cath done 6/28/23 revealed 30% stenosis mid LAD   Mammogram: last done 11/1/2024 [id number]  ASCVD: The 10-year ASCVD risk score (Harshal ROSALES, et al., 2019) is: 34.1%    Values used to calculate the score:      Age: 78 years      Sex: Female      Is Non- : No      Diabetic: No      Tobacco smoker: No      Systolic Blood Pressure: 134 mmHg      Is BP treated: Yes      HDL Cholesterol: 59 mg/dL      Total Cholesterol: 178 mg/dL      Review of Systems  As per hpi  Vitals:    03/11/24 1021   BP: 134/79   Pulse: 85   Resp: 18   Temp: 97.9 °F (36.6 °C)   TempSrc: Oral   SpO2: 99%   Weight: 87.6 kg (193 lb 3.2 oz)   Height: 5' (1.524 m)        Physical Exam  Constitutional:       Appearance: Normal appearance.   HENT:      Head: Normocephalic and atraumatic.      Comments: Small healing bruise to right hairline, no longer bruised, non tender, not bleeding  Eyes:      Extraocular Movements: Extraocular movements intact.      Conjunctiva/sclera: Conjunctivae normal.   Cardiovascular:      Rate and Rhythm: Normal rate and regular rhythm.      Heart sounds: Normal heart sounds.   Pulmonary:      Effort: Pulmonary effort is normal.      Breath sounds: Normal breath sounds.   Musculoskeletal:         General: No swelling, tenderness or signs of injury.    Skin:     General: Skin is dry.   Neurological:      Mental Status: She is alert. Mental status is at baseline.      Comments: No vision in right eye, but full motion in all directions   Psychiatric:         Mood and Affect: Mood normal.        Current Medications:   Current Outpatient Medications   Medication Sig Dispense Refill    acetaminophen (TYLENOL) 650 MG TbSR Take 650 mg by mouth every 8 (eight) hours.      APPLE CIDER VINEGAR ORAL Take by mouth.      aspirin (ECOTRIN) 81 MG EC tablet Take 81 mg by mouth once daily.      atorvastatin (LIPITOR) 20 MG tablet Take 1 tablet (20 mg total) by mouth once daily. 90 tablet 0    b complex vitamins capsule Take 1 capsule by mouth once daily.      calcium carbonate (OS-SREEDHAR) 500 mg calcium (1,250 mg) chewable tablet Take 1 tablet by mouth once daily.      cetirizine 10 mg chewable tablet Take 10 mg by mouth once daily.      diclofenac sodium (VOLTAREN) 1 % Gel Apply 2 g topically 4 (four) times daily. PRN for pain (Patient not taking: Reported on 9/18/2023) 100 g 0    docusate sodium (COLACE) 100 MG capsule Take 100 mg by mouth once daily at 6am.      famotidine (PEPCID) 20 MG tablet Take 1 tablet (20 mg total) by mouth 2 (two) times daily. 180 tablet 0    ibuprofen (ADVIL,MOTRIN) 200 MG tablet Take 400 mg by mouth every 6 (six) hours as needed for Pain. 3 x week alternate with Tylenol      loratadine (CLARITIN) 10 mg tablet Take 1 tablet (10 mg total) by mouth once daily. 30 tablet 2    losartan (COZAAR) 50 MG tablet Take 1 tablet (50 mg total) by mouth once daily. 90 tablet 0    methylcellulose oral powder Take 3.4 g by mouth once daily.      metroNIDAZOLE (METROGEL) 0.75 % gel Apply topically 2 (two) times daily. Apply to face PRN for rosacea related rash 45 g 1    multivitamin capsule Take 1 capsule by mouth once daily.      sertraline (ZOLOFT) 50 MG tablet Take 1 tablet (50 mg total) by mouth once daily. 90 tablet 0    vitamin D (VITAMIN D3) 1000 units Tab Take  1,000 Units by mouth once daily.       Current Facility-Administered Medications   Medication Dose Route Frequency Provider Last Rate Last Admin    sodium chloride 0.9% flush 10 mL  10 mL Intravenous PRN Kaveh Morales MD           Assessment:   1. Fall, subsequent encounter  Doing well, bruises have healed with only small nodule to right hairline  Precautions made at home to prevent recurrent falls, I.e the vent has been secured to the floor and she has gotten rid of shoes that may cause her to trip    2. Osteopenia of neck of femur, unspecified laterality  Last prolia injection on 2/8/24  Dexa scan ordered    3. Mixed hyperlipidemia  Refill sent  Lipid panel at next visit    4. Rosacea  No noticeable rash today  Patient recently indulged in spicy foods  Handout given on condition and avoidance of trigger  metroNIDAZOLE (METROGEL) 0.75 % gel; Apply topically 2 (two) times daily. Apply to face PRN for rosacea related rash  Dispense: 45 g; Refill: 1    5. Primary hypertension  Refill sent  At goal in clinic today    6. Gastroesophageal reflux disease, unspecified whether esophagitis present  Symptoms well controlled  Refill sent       Orders Placed This Encounter    DXA Bone Density Appendicular Skeleton    atorvastatin (LIPITOR) 20 MG tablet    sertraline (ZOLOFT) 50 MG tablet    metroNIDAZOLE (METROGEL) 0.75 % gel    loratadine (CLARITIN) 10 mg tablet    losartan (COZAAR) 50 MG tablet    famotidine (PEPCID) 20 MG tablet       Follow up in about 5 months (around 8/8/2024) for prolia injection.     Carey Gonzalez DO  [employer]  Resident, HO-1

## 2024-04-30 ENCOUNTER — OFFICE VISIT (OUTPATIENT)
Dept: CARDIOLOGY | Facility: CLINIC | Age: 79
End: 2024-04-30
Payer: MEDICARE

## 2024-04-30 VITALS
WEIGHT: 192.81 LBS | HEIGHT: 60 IN | TEMPERATURE: 98 F | BODY MASS INDEX: 37.85 KG/M2 | HEART RATE: 70 BPM | OXYGEN SATURATION: 99 % | SYSTOLIC BLOOD PRESSURE: 152 MMHG | DIASTOLIC BLOOD PRESSURE: 78 MMHG | RESPIRATION RATE: 20 BRPM

## 2024-04-30 DIAGNOSIS — I10 PRIMARY HYPERTENSION: Primary | ICD-10-CM

## 2024-04-30 DIAGNOSIS — E78.2 MIXED HYPERLIPIDEMIA: ICD-10-CM

## 2024-04-30 PROCEDURE — 99214 OFFICE O/P EST MOD 30 MIN: CPT | Mod: S$PBB,,, | Performed by: NURSE PRACTITIONER

## 2024-04-30 PROCEDURE — 99215 OFFICE O/P EST HI 40 MIN: CPT | Mod: PBBFAC | Performed by: NURSE PRACTITIONER

## 2024-04-30 RX ORDER — ATORVASTATIN CALCIUM 20 MG/1
20 TABLET, FILM COATED ORAL DAILY
Qty: 90 TABLET | Refills: 3 | Status: SHIPPED | OUTPATIENT
Start: 2024-04-30 | End: 2025-04-30

## 2024-04-30 RX ORDER — LOSARTAN POTASSIUM 50 MG/1
50 TABLET ORAL DAILY
Qty: 90 TABLET | Refills: 3 | Status: SHIPPED | OUTPATIENT
Start: 2024-04-30 | End: 2025-04-30

## 2024-04-30 NOTE — PATIENT INSTRUCTIONS
Nurse visit in 2 weeks for BP check (call)   Follow up in cardiology clinic in 6 months or sooner if needed  Follow up with PCP as directed

## 2024-04-30 NOTE — PROGRESS NOTES
CHIEF COMPLAINT:   Chief Complaint   Patient presents with    Follow-up     Denies any cardiac problems                                                  HPI:  Nu Denny 79 y.o. female with PMH of Nonobstructive CAD per [facility] 6.28.23, HTN, HLD, GERD, osteopenia, chronic neck pain who presents to cardiology clinic for routine follow-up and ongoing care.  Due to previous complaints of chest pain, the patient completed a Nuclear Stress Test on 5.19.23 that was abnormal, revealing a moderate intensity, small to moderate-sized reversible perfusion defect in the LAD territory.  She underwent a subsequent Left Heart Catheterization on 6.28.23 that revealed nonobstructive coronary artery disease (30% mid LAD stenosis).  Latest Echocardiogram obtained on 1.24.23 revealed a preserved EF- 65%, Grade I Diastolic Dysfunction. (See reports below).     Patient presents to clinic today denying any chest pain, shortness of breath, palpitations, orthopnea, PND, claudication, lightheadedness, dizziness or syncope.  She does endorse stable occasional right ankle edema secondary to a bone spur but notes overall improvement with elevation.  The patient also reports a mechanical fall that occurred in February 2024, but denies any other falls.  She states that she is able to perform her regular housework and activities without experiencing any ischemic symptoms.  She endorses compliance with her current medications and is currently tolerating without issue.                                                                                                                                                                                                                                                                                                                                                                                                                                                                            CARDIAC TESTING:    Echo  1.24.23    Interpretation Summary  · The left ventricle is normal in size with concentric remodeling and normal systolic function.  · The estimated ejection fraction is 65%.  · Grade I left ventricular diastolic dysfunction.  · Normal right ventricular size with normal right ventricular systolic function.  · There is no pulmonary hypertension.  · The estimated PA systolic pressure is 25 mmHg.  · Normal central venous pressure (3 mmHg).        Nuclear Stress - Cardiology Interpreted 5.19.23    Interpretation Summary    Abnormal myocardial perfusion scan.    There is a moderate intensity, small to moderate sized, reversible perfusion abnormality that is consistent with ischemia in the distal to apical apical wall(s) in the typical distribution of the LAD territory.    There are no other significant perfusion abnormalities.    The gated perfusion images showed an ejection fraction of 70% at rest. The gated perfusion images showed an ejection fraction of 80% post stress.    The patient reported no chest pain during the stress test.    There were no arrhythmias during stress.    On the nuclear stress test the EF is normal.  If the patient has an echo, the EF on the echo is considered more accurate.    The patient has a low to moderate risk nuclear stress test.    If patient is symptomatic, consider management with two anti-anginals      Cardiac catheterization  6.28.23    Conclusion    The pre-procedure left ventricular end diastolic pressure was 18.    The estimated blood loss was <50 mL.    There was non-obstructive coronary artery disease..    FINDINGS  LVEDP:  18 mmHg  Left Main:  No stenosis  LAD:   30% mid LAD stenosis  Circumflex:  No stenosis  RCA:  No stenosis  The patient has  Non-obstructive CAD  Blood loss:  less than 50 cc.    RECOMMENDATIONS  Medical management  Risk factor modifications -- smoking cessation  Activity -- avoid straining with affected limb for one week  Exercise on regular  basis.  Precautions post D/C -- come to ER for hematoma, unusual pain, erythema, or unusual drainage at access site  Precautions post D/C -- if develop bleeding or hematoma at access site, hold pressure at access site, and come to ER        The procedure log was documented by Documenter: Janet Pryor RN and verified by Spencer Rae MD.    Date: 6/28/2023  Time: 8:15 AM       Patient Active Problem List   Diagnosis    Hypertension    Hyperlipidemia    Anxiety    Gastroesophageal reflux disease    Osteopenia of neck of femur    Central retinal vein occlusion of right eye    Cataract of left eye    Urinary incontinence    Female bladder prolapse    Herpes zoster oticus    Pre-op evaluation     Past Surgical History:   Procedure Laterality Date    ANGIOGRAM, CORONARY, WITH LEFT HEART CATHETERIZATION N/A 6/28/2023    Procedure: Angiogram, Coronary, with Left Heart Cath;  Surgeon: Kaveh Morales MD;  [facility] C[facility] LAB;  Service: Cardiology;  Laterality: N/A;    CHOLECYSTECTOMY      COLONOSCOPY  2017    HYSTERECTOMY      KNEE SURGERY Right     x2; states MD wanted to do replacement but pt declined    SHOULDER SURGERY  1995    TONSILLECTOMY      VITRECTOMY       Social History     Socioeconomic History    Marital status:    Tobacco Use    Smoking status: Never     Passive exposure: Past    Smokeless tobacco: Never   Substance and Sexual Activity    Alcohol use: Never    Drug use: Never        Family History   Problem Relation Name Age of Onset    Hypertension Mother      No Known Problems Father      No Known Problems Sister      No Known Problems Brother      No Known Problems Maternal Aunt      No Known Problems Maternal Uncle      No Known Problems Paternal Aunt      No Known Problems Paternal Uncle      Diabetes Maternal Grandmother      No Known Problems Maternal Grandfather      No Known Problems Paternal Grandmother      No Known Problems Paternal Grandfather      Amblyopia Neg Hx       Blindness Neg Hx      Cancer Neg Hx      Cataracts Neg Hx      Glaucoma Neg Hx      Macular degeneration Neg Hx      Retinal detachment Neg Hx      Strabismus Neg Hx      Stroke Neg Hx      Thyroid disease Neg Hx       Review of patient's allergies indicates:   Allergen Reactions    Codeine Rash         ROS:  Review of Systems   Constitutional: Negative.    HENT: Negative.     Eyes: Negative.    Respiratory: Negative.  Negative for shortness of breath.    Cardiovascular: Negative.    Gastrointestinal: Negative.    Genitourinary: Negative.    Musculoskeletal: Negative.    Skin: Negative.    Neurological: Negative.    Endo/Heme/Allergies: Negative.    Psychiatric/Behavioral: Negative.                                                                                                                                                                                  Negative except as stated in the history of present illness. See HPI for details.    PHYSICAL EXAM:  Visit Vitals  BP (!) 152/78 (BP Location: Left arm, Patient Position: Sitting, BP Method: Large (Manual))   Pulse 70   Temp 97.7 °F (36.5 °C) (Oral)   Resp 20   Ht 5' (1.524 m)   Wt 87.5 kg (192 lb 12.8 oz)   SpO2 99%   BMI 37.65 kg/m²       Physical Exam  HENT:      Head: Normocephalic.      Nose: Nose normal.   Eyes:      Pupils: Pupils are equal, round, and reactive to light.   Cardiovascular:      Rate and Rhythm: Normal rate and regular rhythm.   Pulmonary:      Effort: Pulmonary effort is normal.   Abdominal:      General: Abdomen is flat. Bowel sounds are normal.      Palpations: Abdomen is soft.   Musculoskeletal:         General: Normal range of motion.      Cervical back: Normal range of motion.   Skin:     General: Skin is warm.   Neurological:      General: No focal deficit present.      Mental Status: She is alert.   Psychiatric:         Mood and Affect: Mood normal.         Current Outpatient Medications   Medication Instructions    acetaminophen  (TYLENOL) 650 mg, Oral, Every 8 hours    APPLE CIDER VINEGAR ORAL Oral    aspirin (ECOTRIN) 81 mg, Oral, Daily    atorvastatin (LIPITOR) 20 mg, Oral, Daily    b complex vitamins capsule 1 capsule, Oral, Daily    calcium carbonate (OS-SREEDHAR) 500 mg calcium (1,250 mg) chewable tablet 1 tablet, Oral, Daily    cetirizine 10 mg, Oral, Daily    diclofenac sodium (VOLTAREN) 2 g, Topical (Top), 4 times daily, PRN for pain    docusate sodium (COLACE) 100 mg, Oral, Daily    famotidine (PEPCID) 20 mg, Oral, 2 times daily    ibuprofen (ADVIL,MOTRIN) 400 mg, Oral, Every 6 hours PRN, 3 x week alternate with Tylenol    loratadine (CLARITIN) 10 mg, Oral, Daily    losartan (COZAAR) 50 mg, Oral, Daily    methylcellulose oral powder 3.4 g, Daily    metroNIDAZOLE (METROGEL) 0.75 % gel Topical (Top), 2 times daily, Apply to face PRN for rosacea related rash    multivitamin capsule 1 capsule, Oral, Daily    sertraline (ZOLOFT) 50 mg, Oral, Daily    vitamin D (VITAMIN D3) 1,000 Units, Oral, Daily        All medications, laboratory studies, cardiac diagnostic imaging reviewed.     Lab Results   Component Value Date    LDL 98.00 03/31/2023    .00 07/02/2021    TRIG 105 03/31/2023    TRIG 94 07/02/2021    CREATININE 0.73 06/22/2023    MG 2.10 10/15/2021    K 4.5 06/22/2023        ASSESSMENT/PLAN:  Nonobstructive CAD  - [facility]- nonobstructive CAD, 30% mid LAD stenosis ( 6.28.23)  - EF -65% per ECHO 1.24.23  - Denies CP or SOB  - Continue ASA, Atorvastatin 20 mg, and losartan 50 mg  - Advised on heart healthy, low-cholesterol diet and activity as tolerated    HTN  - BP above goal, but normally well controlled  - Continue current medications losartan 50 mg for now  - Instructed patient to monitor and log blood pressure and call with readings in 2 weeks  - Advised on low salt diet and activity as tolerated     HLD  - Continue Atorvastatin 20 mg   - Advised on low-cholesterol, low-fat diet and exercise as tolerated    Nurse visit in 2 weeks for  BP check (call)   Follow up in cardiology clinic in 6 months or sooner if needed  Follow up with PCP as directed

## 2024-05-23 ENCOUNTER — OFFICE VISIT (OUTPATIENT)
Dept: OPHTHALMOLOGY | Facility: CLINIC | Age: 79
End: 2024-05-23
Payer: MEDICARE

## 2024-05-23 DIAGNOSIS — H04.123 DRY EYE SYNDROME OF BOTH EYES: ICD-10-CM

## 2024-05-23 DIAGNOSIS — H34.8110 CENTRAL RETINAL VEIN OCCLUSION WITH MACULAR EDEMA OF RIGHT EYE: Primary | ICD-10-CM

## 2024-05-23 DIAGNOSIS — H25.12 AGE-RELATED NUCLEAR CATARACT OF LEFT EYE: ICD-10-CM

## 2024-05-23 PROCEDURE — 92134 CPTRZ OPH DX IMG PST SGM RTA: CPT | Mod: PBBFAC,PN,GC

## 2024-05-23 PROCEDURE — 99213 OFFICE O/P EST LOW 20 MIN: CPT | Mod: PBBFAC,PN,25

## 2024-05-23 NOTE — PROGRESS NOTES
[facility]    RTC 4 months DFE, OCT, NVD check OD  Last edited by Candie Smith MA on 5/23/2024  1:42 PM.            Assessment /Plan     For exam results, see Encounter Report.    There are no diagnoses linked to this encounter.       OCT Mac 5/23/24  OD: 414 um, large cystic IRF diffusely  OS: 346 um, stable ERM, no IRF/SRF     IVFA 11/30/22: overexposed image ou, prp scars and expected disc staining. No leakage ou     ==================    1) CRVO OD with CME  - Unclear timing although documented 10/2019  - IVFA unable to be done due to poor venous access  - Carotid showed <50% stenosis OU.  - Lasers: PRP OD 2/16/22, with fill in 03/16/22, 7/20/22  INJECTION LOG  - Avastin (10/2019)- no response, RAYA 2 (7/14/2020) - poor response. 1/31/22 for NVD  - Ozurdex (12/4/2019) - worked great previously  - Eylea 3/3 (8/19/2020, 9/17/2020, 10/16/20  - Plan most of 2020 was to proceed with ozurdex injection if no improvement in mac edema. After discussion with Dr. Greer, does not think ozurdex would provide significant benefit over triessence. Also, there is concern that vein occlusion has worsened given lack of improvement and significant worsening of edema and vision over past 5 months at end of 2020.  - Patient elected to stop injections in 2020 given she has not had significant vision changes with injections  - Exam 1/31/22 New NVD & frond of NVE inferotemporally.   - 11/15/22; no NVI or NVA; odd NVD vs collaterals noted OD.  Needs IVFA prior to continued injections or laser.  IOP and vision stable  - IVFA 11/30/22: overexposed image but no leakage ou. Will start spacing out visits as long as she is stable  - 5/23/24 Still with stable OCT macula. Stable vision. Stable dilated examination. Continue q4 month examinations.     2) Age-related nuclear cataract, left eye  - High threshold for surgery given functionally monocular, cleared by [facility] for surgery  - Last MRX to 20/30 1/19/24 (polycarb lenses)  - Monitor    3) ERM, OU  -  H/o ERM OD s/p 25g PPV, ERM/ILM peel with FAx 5/23/17. Stable  - H/o ERM OS, NVS, OCT mac stable  - Monitor    4) Pseudophakia of right eye  - Done 4/24/12 by Dr. Little, SN60WF 18.5D  - Monitor    5) Posterior vitreous detachment, left eye  - No holes/tears/breaks on DFE  - RD precautions    6) Dry eye syndrome of both lacrimal glands  - AT 4-6x/day as needed, lid hygiene  - No complaints today    RTC 4 months DFE, OCT, NVD check OD

## 2024-07-05 ENCOUNTER — TELEPHONE (OUTPATIENT)
Dept: CARDIOLOGY | Facility: CLINIC | Age: 79
End: 2024-07-05
Payer: MEDICARE

## 2024-07-05 NOTE — TELEPHONE ENCOUNTER
Attempted to contact patient to reschedule 10/30/24 apt but no answer. Unable to leave a vm due to pt's vm not setup. Will try to contact patient again.

## 2024-07-31 RX ORDER — SERTRALINE HYDROCHLORIDE 50 MG/1
50 TABLET, FILM COATED ORAL DAILY
Qty: 90 TABLET | Refills: 0 | Status: SHIPPED | OUTPATIENT
Start: 2024-07-31 | End: 2024-10-29

## 2024-08-02 ENCOUNTER — OFFICE VISIT (OUTPATIENT)
Dept: FAMILY MEDICINE | Facility: CLINIC | Age: 79
End: 2024-08-02
Payer: MEDICARE

## 2024-08-02 VITALS
BODY MASS INDEX: 37.69 KG/M2 | SYSTOLIC BLOOD PRESSURE: 136 MMHG | OXYGEN SATURATION: 99 % | HEIGHT: 60 IN | DIASTOLIC BLOOD PRESSURE: 76 MMHG | HEART RATE: 79 BPM | TEMPERATURE: 98 F | RESPIRATION RATE: 18 BRPM | WEIGHT: 192 LBS
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
## 2024-08-02 DIAGNOSIS — R73.03 PREDIABETES: ICD-10-CM

## 2024-08-02 DIAGNOSIS — E78.2 MIXED HYPERLIPIDEMIA: ICD-10-CM

## 2024-08-02 DIAGNOSIS — E55.9 VITAMIN D DEFICIENCY: ICD-10-CM

## 2024-08-02 DIAGNOSIS — I10 PRIMARY HYPERTENSION: ICD-10-CM

## 2024-08-02 DIAGNOSIS — K21.9 GASTROESOPHAGEAL REFLUX DISEASE, UNSPECIFIED WHETHER ESOPHAGITIS PRESENT: ICD-10-CM

## 2024-08-02 DIAGNOSIS — M85.851 OSTEOPENIA OF NECKS OF BOTH FEMURS: ICD-10-CM

## 2024-08-02 DIAGNOSIS — M85.852 OSTEOPENIA OF NECKS OF BOTH FEMURS: ICD-10-CM

## 2024-08-02 LAB
25(OH)D3+25(OH)D2 SERPL-MCNC: 48 NG/ML (ref 30–80)
ALBUMIN SERPL-MCNC: 4 G/DL (ref 3.4–4.8)
ALBUMIN/GLOB SERPL: 1.3 RATIO (ref 1.1–2)
ALP SERPL-CCNC: 80 UNIT/L (ref 40–150)
ALT SERPL-CCNC: 23 UNIT/L (ref 0–55)
ANION GAP SERPL CALC-SCNC: 9 MEQ/L
AST SERPL-CCNC: 25 UNIT/L (ref 5–34)
BASOPHILS # BLD AUTO: 0.03 X10(3)/MCL
BASOPHILS NFR BLD AUTO: 0.5 %
BILIRUB SERPL-MCNC: 1.1 MG/DL
BUN SERPL-MCNC: 23.3 MG/DL (ref 9.8–20.1)
CALCIUM SERPL-MCNC: 10 MG/DL (ref 8.4–10.2)
CHLORIDE SERPL-SCNC: 111 MMOL/L (ref 98–107)
CHOLEST SERPL-MCNC: 164 MG/DL
CHOLEST/HDLC SERPL: 3 {RATIO} (ref 0–5)
CO2 SERPL-SCNC: 24 MMOL/L (ref 23–31)
CREAT SERPL-MCNC: 0.75 MG/DL (ref 0.55–1.02)
CREAT/UREA NIT SERPL: 31
EOSINOPHIL # BLD AUTO: 0.2 X10(3)/MCL (ref 0–0.9)
EOSINOPHIL NFR BLD AUTO: 3.1 %
ERYTHROCYTE [DISTWIDTH] IN BLOOD BY AUTOMATED COUNT: 13.3 % (ref 11.5–17)
EST. AVERAGE GLUCOSE BLD GHB EST-MCNC: 114 MG/DL
GFR SERPLBLD CREATININE-BSD FMLA CKD-EPI: >60 ML/MIN/1.73/M2
GLOBULIN SER-MCNC: 3.2 GM/DL (ref 2.4–3.5)
GLUCOSE SERPL-MCNC: 99 MG/DL (ref 82–115)
HBA1C MFR BLD: 5.6 %
HCT VFR BLD AUTO: 39.1 % (ref 37–47)
HDLC SERPL-MCNC: 54 MG/DL (ref 35–60)
HGB BLD-MCNC: 12.7 G/DL (ref 12–16)
IMM GRANULOCYTES # BLD AUTO: 0.02 X10(3)/MCL (ref 0–0.04)
IMM GRANULOCYTES NFR BLD AUTO: 0.3 %
LDLC SERPL CALC-MCNC: 85 MG/DL (ref 50–140)
LYMPHOCYTES # BLD AUTO: 2.37 X10(3)/MCL (ref 0.6–4.6)
LYMPHOCYTES NFR BLD AUTO: 36.7 %
MCH RBC QN AUTO: 30.4 PG (ref 27–31)
MCHC RBC AUTO-ENTMCNC: 32.5 G/DL (ref 33–36)
MCV RBC AUTO: 93.5 FL (ref 80–94)
MONOCYTES # BLD AUTO: 0.49 X10(3)/MCL (ref 0.1–1.3)
MONOCYTES NFR BLD AUTO: 7.6 %
NEUTROPHILS # BLD AUTO: 3.35 X10(3)/MCL (ref 2.1–9.2)
NEUTROPHILS NFR BLD AUTO: 51.8 %
NRBC BLD AUTO-RTO: 0 %
PLATELET # BLD AUTO: 253 X10(3)/MCL (ref 130–400)
PMV BLD AUTO: 10.3 FL (ref 7.4–10.4)
POTASSIUM SERPL-SCNC: 3.8 MMOL/L (ref 3.5–5.1)
PROT SERPL-MCNC: 7.2 GM/DL (ref 5.8–7.6)
RBC # BLD AUTO: 4.18 X10(6)/MCL (ref 4.2–5.4)
SODIUM SERPL-SCNC: 144 MMOL/L (ref 136–145)
TRIGL SERPL-MCNC: 125 MG/DL (ref 37–140)
VLDLC SERPL CALC-MCNC: 25 MG/DL
WBC # BLD AUTO: 6.46 X10(3)/MCL (ref 4.5–11.5)

## 2024-08-02 PROCEDURE — 36415 COLL VENOUS BLD VENIPUNCTURE: CPT

## 2024-08-02 PROCEDURE — 80061 LIPID PANEL: CPT

## 2024-08-02 PROCEDURE — 85025 COMPLETE CBC W/AUTO DIFF WBC: CPT

## 2024-08-02 PROCEDURE — 99215 OFFICE O/P EST HI 40 MIN: CPT | Mod: PBBFAC

## 2024-08-02 PROCEDURE — 80053 COMPREHEN METABOLIC PANEL: CPT

## 2024-08-02 PROCEDURE — 83036 HEMOGLOBIN GLYCOSYLATED A1C: CPT

## 2024-08-02 PROCEDURE — 82306 VITAMIN D 25 HYDROXY: CPT

## 2024-08-02 RX ORDER — FAMOTIDINE 20 MG/1
20 TABLET, FILM COATED ORAL 2 TIMES DAILY
Qty: 180 TABLET | Refills: 0 | Status: SHIPPED | OUTPATIENT
Start: 2024-08-02

## 2024-08-02 NOTE — PROGRESS NOTES
[facility] [place]IT [place]  Nu Denny  [id number]  08/02/2024    Chief Complaint   Patient presents with    Follow-up     Prolia injection    Medication Refill       Nu Denny is a 79 y.o. female  presenting to [facility] for prolia injection and medication refill.    History of present Illness  Recent fall: 2-3 weeks ago, screw sticking out of floorboard caught on pants and she tripped and fell. No head injury, landed on knees    Past medical history  Hypertension: patient taking medications as instructed, no medication side effects noted. did not bring log.  Osteopenia: last prolia injection 2/2024.   Vitamin d deficiency: taking vitamin d and calcium otc     Health Maintenance  Cervical Cancer: cervix present. Denies hx of abnormal pap smear in past. Declines further pap smears.   Breast Cancer: bi-rads 1 bilaterally  Colon Cancer: colonoscopy 2017 with polypectomy, cologuard 8/31/2021 negative,   Osteoporosis screening: osteopenia -1.3 mean femur density. Taking prolia began 6/2022. Repeat dexa scan due      Review of Systems   Constitutional:  Negative for fatigue and fever.   Respiratory:  Negative for shortness of breath.    Cardiovascular:  Negative for chest pain.       Vitals:    08/02/24 1025   BP: 136/76   Pulse: 79   Resp: 18   Temp: 97.7 °F (36.5 °C)   TempSrc: Oral   SpO2: 99%   Weight: 87.1 kg (192 lb)   Height: 5' (1.524 m)        Physical Exam  General: well appearing, NAD A& O x 4  Cardio: reg rate and rhythm  Pulm: lungs clear to auscultation bilaterally  MSK: bilateral knees nontender to palpation, no bony abnormality, full ROM, 5/5 muscle strength  Skin: bilateral knees and left elbow without bruising      Current Medications:   Current Outpatient Medications   Medication Sig Dispense Refill    acetaminophen (TYLENOL) 650 MG TbSR Take 650 mg by mouth every 8 (eight) hours.      APPLE CIDER VINEGAR ORAL Take by mouth.      aspirin (ECOTRIN) 81 MG EC tablet Take 81 mg by mouth once daily.       atorvastatin (LIPITOR) 20 MG tablet Take 1 tablet (20 mg total) by mouth once daily. 90 tablet 3    b complex vitamins capsule Take 1 capsule by mouth once daily.      calcium carbonate (OS-SREEDHAR) 500 mg calcium (1,250 mg) chewable tablet Take 1 tablet by mouth once daily.      docusate sodium (COLACE) 100 MG capsule Take 100 mg by mouth once daily at 6am.      famotidine (PEPCID) 20 MG tablet Take 1 tablet (20 mg total) by mouth 2 (two) times daily. 180 tablet 0    losartan (COZAAR) 50 MG tablet Take 1 tablet (50 mg total) by mouth once daily. 90 tablet 3    metroNIDAZOLE (METROGEL) 0.75 % gel Apply topically 2 (two) times daily. Apply to face PRN for rosacea related rash 45 g 1    multivitamin capsule Take 1 capsule by mouth once daily.      sertraline (ZOLOFT) 50 MG tablet Take 1 tablet (50 mg total) by mouth once daily. 90 tablet 0    vitamin D (VITAMIN D3) 1000 units Tab Take 1,000 Units by mouth once daily.       Current Facility-Administered Medications   Medication Dose Route Frequency Provider Last Rate Last Admin    denosumab (PROLIA) injection 60 mg  60 mg Subcutaneous 1 time in Clinic/HOD            Assessment:   Fall  2nd fall this year, due to hazards in the home  Did not sustain head injury and did not visit ED  Patient has family close by that check on her.     Primary hypertension  At goal today  Weight loss, low sodium diet encouraged  Continue current treatment plan    Mixed hyperlipidemia  LDL at goal of less than 90  Continue current treatment plan    Osteopenia of necks of both femurs  Prolia administered today, next dose in 6 months  Calcium levels stable  Continue current treatment plan    Vitamin D deficiency  Vitamin d level within normal limits  Continue supplementation    Prediabetes  A1c of 5.6, constant over the past 3-4 years. Given the patients age, likelihood of progression to diabetes is low.    Gastroesophageal reflux disease, unspecified whether esophagitis present  Refill sent  for pepcid  Continue current management       Orders Placed This Encounter    Lipid Panel    CBC Auto Differential    Comprehensive Metabolic Panel    Hemoglobin A1C    Vitamin D    CBC with Differential    famotidine (PEPCID) 20 MG tablet    denosumab (PROLIA) injection 60 mg       Follow up in about 3 months (around 11/2/2024).     Carey Gonzalez DO  [employer]  Resident, -II

## 2024-08-03 NOTE — PROGRESS NOTES
Discussed with resident at time of encounter 08-02-24.  Pt. seen.  Recent fall at home; impact to bilateral knees; no head trauma.    PE  Gen: NAD, ambulatory without assistive devices.  Bilateral knees: benign.    Resident's note reviewed 08-03-24.  Agree with assessment; plan of care appropriate.  ? status of previously requested colonoscopy; GI requested clearance (03-21-23).  Professional services provided in an outpatient primary care center affiliated with a teaching institution.     Mali Hart is a 5year old female who was brought in for this visit.   History was provided by Mother    HPI:   Patient presents with:  Sore Throat: x3day, white spots in back of throat, negative covid 9/20     C/o sore throat x 3 days - white spots not appearing acutely ill or in discomfort. EENT:     Eyes: Conjunctivae and lids are w/o erythema or  inflammation. Appearing unremarkable. No eye discharge. Eyes moist.    Ears:    Left:  External ear and pinna are unremarkable.  External canal unremarkab yr and older)  · Acetaminophen and ibuprofen can be helpful for pain  · Pain will usually be worse upon awakening and when going to sleep  · If Rosemary Hemp is not feeling better by day 5 or worsens significantly = recheck  · You will be notified of test result

## 2024-08-21 NOTE — PROGRESS NOTES
[facility] Progress Note    ID:  Nu Denny   MRN:  [id number]     9/27/2023    Chief Complaint:      History of Present Illness:  Nu Denny is a 77 y.o. female who presents to [facility] FM clinic for       Acute Issues:    Neck pain- 1 month history of throbbing 4-8/10 posterior neck and back of the skull pain worse with a lateral rotation him when riding in the car.  She takes Tylenol arthritis 650 mg x 2 tabs, 3 times per day.  She denies any trauma, falls, numbness or tingling in shoulders or arms, and headaches.  Anxiety- poorly controlled, has been anxious about her continuing ophthalmology procedures and missed last week's appointment.  She has been out of her medications for roughly 10 days the reports symptoms were poorly controlled on previous dosing of Zoloft 25 mg.  Interested in increasing dose to 50 mg.  Side effects include drowsiness so she takes before bedtime.    Chronic issues:   CRVO right eye with blindness, left eye cataract  Bladder prolapse/urinary incontinence  Anxiety- see above  Osteopenia  HTN  HLD  GERD    Past Medical History:   Diagnosis Date    Anxiety 06/13/2022    Cataract of left eye 06/13/2022    Central retinal vein occlusion of right eye 06/13/2022    Female bladder prolapse 06/13/2022    Gastroesophageal reflux disease 06/13/2022    Hyperlipidemia 06/13/2022    Hypertension 06/13/2022    Mild CAD     Osteopenia of neck of femur 06/13/2022    Urinary incontinence 06/13/2022    Zenker diverticulum        Past Surgical History:   Procedure Laterality Date    CHOLECYSTECTOMY      COLONOSCOPY  2017    HYSTERECTOMY      KNEE SURGERY Right     x2; states MD wanted to do replacement but pt declined    SHOULDER SURGERY  1995    TONSILLECTOMY      VITRECTOMY           Current Outpatient Medications:     aspirin (ECOTRIN) 81 MG EC tablet, Take 81 mg by mouth once daily., Disp: , Rfl:     atorvastatin (LIPITOR) 20 MG tablet, Take 1 tablet (20 mg total) by mouth once daily.,  [FreeTextEntry1] : Documented by Gerald Valles acting as scribe for Dr. Berman on  08/21/2024.   All Medical record entries made by the Scribe were at my, Dr. Berman's, direction and personally dictated by me on  08/21/2024. I have reviewed the chart and agree that the record accurately reflects my personal performance of the history, physical exam, assessment and plan. I have also personally directed, reviewed, and agreed with the discharge instructions.  Disp: 90 tablet, Rfl: 0    b complex vitamins capsule, Take 1 capsule by mouth once daily., Disp: , Rfl:     calcium carbonate (OS-SREEDHAR) 500 mg calcium (1,250 mg) chewable tablet, Take 1 tablet by mouth once daily., Disp: , Rfl:     cyclobenzaprine (FLEXERIL) 5 MG tablet, Take 1 tablet (5 mg total) by mouth 3 (three) times daily as needed for Muscle spasms., Disp: 30 tablet, Rfl: 0    docusate sodium (COLACE) 100 MG capsule, Take 100 mg by mouth once daily at 6am., Disp: , Rfl:     famotidine (PEPCID) 20 MG tablet, Take 1 tablet (20 mg total) by mouth 2 (two) times daily., Disp: 180 tablet, Rfl: 0    losartan (COZAAR) 50 MG tablet, Take 1 tablet (50 mg total) by mouth once daily., Disp: 90 tablet, Rfl: 0    methylcellulose oral powder, Take 3.4 g by mouth once daily., Disp: , Rfl:     metroNIDAZOLE (METROGEL) 0.75 % gel, Apply topically 2 (two) times daily. Apply to face PRN for rosacea related rash, Disp: 45 g, Rfl: 1    multivitamin capsule, Take 1 capsule by mouth once daily., Disp: , Rfl:     sertraline (ZOLOFT) 25 MG tablet, Take 2 tablets (50 mg total) by mouth once daily., Disp: 60 tablet, Rfl: 5    vitamin D (VITAMIN D3) 1000 units Tab, Take 1,000 Units by mouth once daily., Disp: , Rfl:     Review of patient's allergies indicates:   Allergen Reactions    Codeine Rash         Review of Systems:  See HPI      Physical Exam:  BP (!) 146/84   Pulse 80   Temp 99 °F (37.2 °C)   Resp 18   Wt 88.9 kg (196 lb)   SpO2 97%   BMI 35.85 kg/m²     Gen- well appearing obese elderly white female sitting comfortable in exam chair, no acute distress  HEENT- glasses in place, no scleral icterus or conjunctival injection.  Minimal tenderness in the occiput.  Right eye blindness  CV- regular rate and rhythm, no significant murmurs.  No lower extremity edema or JVD  Resp- breathing is nonlabored, lungs clear  MSK- Neck showed no significant deformity or muscular atrophy, no cervical paraspinal muscle tenderness, range of  motion no significant restrictions with neck flexion, extension, bilateral flexion and rotation limited secondary to pain. Strength 5/5. Negative Spurling test.  Neuro- gross sensation intact  Skin- no significant wounds or rashes    Assessment/Plan:    Neck pain   - poorly controlled  -likely musculoskeletal, cannot rule out compression fracture in setting of patient's osteopenia  -cervical spine x-ray today to rule out fracture  -heating pad or warm compresses 10 minutes 3 times a day  - If symptoms do not improve, prescription for cyclobenzaprine 5 mg t.i.d. PRN  -continue Tylenol Arthritis 650 mg 2 tabs t.i.d.  - will consider physical therapy symptoms not improve and no pathologic fractures    Anxiety   -poorly controlled  -currently taking Zoloft 25 mg daily, has been out since 09/17  -will increase Zoloft 50 mg daily, which is starting therapeutic dose    Immunization due   -flu shot today      Faisal Shirley MD  [employer]  Resident HO2     Follow-up at previous scheduled visit for neck pain and anxiety

## 2024-09-05 ENCOUNTER — HOSPITAL ENCOUNTER (EMERGENCY)
Facility: HOSPITAL | Age: 79
Discharge: HOME | End: 2024-09-05
Attending: EMERGENCY MEDICINE
Payer: MEDICARE

## 2024-09-05 VITALS
HEIGHT: 60 IN | BODY MASS INDEX: 36.36 KG/M2 | HEART RATE: 70 BPM | WEIGHT: 185.19 LBS | RESPIRATION RATE: 18 BRPM | SYSTOLIC BLOOD PRESSURE: 151 MMHG | OXYGEN SATURATION: 98 % | TEMPERATURE: 98 F | DIASTOLIC BLOOD PRESSURE: 85 MMHG

## 2024-09-05 DIAGNOSIS — K57.92 DIVERTICULITIS: Primary | ICD-10-CM

## 2024-09-05 LAB
ALBUMIN SERPL-MCNC: 3.6 G/DL (ref 3.4–4.8)
ALBUMIN/GLOB SERPL: 1.1 RATIO (ref 1.1–2)
ALP SERPL-CCNC: 101 UNIT/L (ref 40–150)
ALT SERPL-CCNC: 20 UNIT/L (ref 0–55)
ANION GAP SERPL CALC-SCNC: 10 MEQ/L
AST SERPL-CCNC: 23 UNIT/L (ref 5–34)
BACTERIA #/AREA URNS AUTO: ABNORMAL /HPF
BASOPHILS # BLD AUTO: 0.04 X10(3)/MCL
BASOPHILS NFR BLD AUTO: 0.4 %
BILIRUB SERPL-MCNC: 1.2 MG/DL
BILIRUB UR QL STRIP.AUTO: NEGATIVE
BUN SERPL-MCNC: 13.2 MG/DL (ref 9.8–20.1)
CALCIUM SERPL-MCNC: 9.3 MG/DL (ref 8.4–10.2)
CHLORIDE SERPL-SCNC: 111 MMOL/L (ref 98–107)
CLARITY UR: ABNORMAL
CO2 SERPL-SCNC: 20 MMOL/L (ref 23–31)
COLOR UR AUTO: YELLOW
CREAT SERPL-MCNC: 0.78 MG/DL (ref 0.55–1.02)
CREAT/UREA NIT SERPL: 17
EOSINOPHIL # BLD AUTO: 0.11 X10(3)/MCL (ref 0–0.9)
EOSINOPHIL NFR BLD AUTO: 1 %
ERYTHROCYTE [DISTWIDTH] IN BLOOD BY AUTOMATED COUNT: 13.2 % (ref 11.5–17)
GFR SERPLBLD CREATININE-BSD FMLA CKD-EPI: >60 ML/MIN/1.73/M2
GLOBULIN SER-MCNC: 3.3 GM/DL (ref 2.4–3.5)
GLUCOSE SERPL-MCNC: 101 MG/DL (ref 82–115)
GLUCOSE UR QL STRIP: NORMAL
HCT VFR BLD AUTO: 38.2 % (ref 37–47)
HGB BLD-MCNC: 12.5 G/DL (ref 12–16)
HGB UR QL STRIP: NEGATIVE
HYALINE CASTS #/AREA URNS LPF: ABNORMAL /LPF
IMM GRANULOCYTES # BLD AUTO: 0.03 X10(3)/MCL (ref 0–0.04)
IMM GRANULOCYTES NFR BLD AUTO: 0.3 %
KETONES UR QL STRIP: NEGATIVE
LEUKOCYTE ESTERASE UR QL STRIP: 75
LYMPHOCYTES # BLD AUTO: 2.61 X10(3)/MCL (ref 0.6–4.6)
LYMPHOCYTES NFR BLD AUTO: 23.3 %
MAGNESIUM SERPL-MCNC: 2.3 MG/DL (ref 1.6–2.6)
MCH RBC QN AUTO: 30.6 PG (ref 27–31)
MCHC RBC AUTO-ENTMCNC: 32.7 G/DL (ref 33–36)
MCV RBC AUTO: 93.4 FL (ref 80–94)
MONOCYTES # BLD AUTO: 1.05 X10(3)/MCL (ref 0.1–1.3)
MONOCYTES NFR BLD AUTO: 9.4 %
MUCOUS THREADS URNS QL MICRO: ABNORMAL /LPF
NEUTROPHILS # BLD AUTO: 7.34 X10(3)/MCL (ref 2.1–9.2)
NEUTROPHILS NFR BLD AUTO: 65.6 %
NITRITE UR QL STRIP: NEGATIVE
NRBC BLD AUTO-RTO: 0 %
PH UR STRIP: 5.5 [PH]
PLATELET # BLD AUTO: 267 X10(3)/MCL (ref 130–400)
PMV BLD AUTO: 10 FL (ref 7.4–10.4)
POTASSIUM SERPL-SCNC: 4.2 MMOL/L (ref 3.5–5.1)
PROT SERPL-MCNC: 6.9 GM/DL (ref 5.8–7.6)
PROT UR QL STRIP: ABNORMAL
RBC # BLD AUTO: 4.09 X10(6)/MCL (ref 4.2–5.4)
RBC #/AREA URNS AUTO: ABNORMAL /HPF
SODIUM SERPL-SCNC: 141 MMOL/L (ref 136–145)
SP GR UR STRIP.AUTO: 1.02 (ref 1–1.03)
SQUAMOUS #/AREA URNS LPF: ABNORMAL /HPF
UROBILINOGEN UR STRIP-ACNC: NORMAL
WBC # BLD AUTO: 11.18 X10(3)/MCL (ref 4.5–11.5)
WBC #/AREA URNS AUTO: ABNORMAL /HPF

## 2024-09-05 PROCEDURE — 83735 ASSAY OF MAGNESIUM: CPT | Performed by: EMERGENCY MEDICINE

## 2024-09-05 PROCEDURE — 85025 COMPLETE CBC W/AUTO DIFF WBC: CPT | Performed by: EMERGENCY MEDICINE

## 2024-09-05 PROCEDURE — 25500020 PHARM REV CODE 255: Performed by: EMERGENCY MEDICINE

## 2024-09-05 PROCEDURE — 81001 URINALYSIS AUTO W/SCOPE: CPT | Performed by: EMERGENCY MEDICINE

## 2024-09-05 PROCEDURE — 96374 THER/PROPH/DIAG INJ IV PUSH: CPT

## 2024-09-05 PROCEDURE — 25000003 PHARM REV CODE 250: Performed by: EMERGENCY MEDICINE

## 2024-09-05 PROCEDURE — 96372 THER/PROPH/DIAG INJ SC/IM: CPT | Mod: 59 | Performed by: EMERGENCY MEDICINE

## 2024-09-05 PROCEDURE — 80053 COMPREHEN METABOLIC PANEL: CPT | Performed by: EMERGENCY MEDICINE

## 2024-09-05 PROCEDURE — 96361 HYDRATE IV INFUSION ADD-ON: CPT

## 2024-09-05 PROCEDURE — 99285 EMERGENCY DEPT VISIT HI MDM: CPT | Mod: 25

## 2024-09-05 PROCEDURE — 63600175 PHARM REV CODE 636 W HCPCS: Performed by: EMERGENCY MEDICINE

## 2024-09-05 RX ORDER — SODIUM CHLORIDE 9 MG/ML
1000 INJECTION, SOLUTION INTRAVENOUS
Status: COMPLETED | OUTPATIENT
Start: 2024-09-05 | End: 2024-09-05

## 2024-09-05 RX ORDER — DICYCLOMINE HYDROCHLORIDE 10 MG/ML
20 INJECTION INTRAMUSCULAR
Status: COMPLETED | OUTPATIENT
Start: 2024-09-05 | End: 2024-09-05

## 2024-09-05 RX ORDER — ONDANSETRON 4 MG/1
4 TABLET, ORALLY DISINTEGRATING ORAL EVERY 8 HOURS PRN
Qty: 14 TABLET | Refills: 0 | Status: SHIPPED | OUTPATIENT
Start: 2024-09-05

## 2024-09-05 RX ORDER — AMOXICILLIN AND CLAVULANATE POTASSIUM 875; 125 MG/1; MG/1
1 TABLET, FILM COATED ORAL 2 TIMES DAILY
Qty: 14 TABLET | Refills: 0 | Status: SHIPPED | OUTPATIENT
Start: 2024-09-05 | End: 2024-09-20

## 2024-09-05 RX ORDER — SODIUM CHLORIDE 9 MG/ML
1000 INJECTION, SOLUTION INTRAVENOUS
Status: DISCONTINUED | OUTPATIENT
Start: 2024-09-05 | End: 2024-09-05

## 2024-09-05 RX ORDER — DICYCLOMINE HYDROCHLORIDE 20 MG/1
20 TABLET ORAL 4 TIMES DAILY PRN
Qty: 120 TABLET | Refills: 0 | Status: SHIPPED | OUTPATIENT
Start: 2024-09-05

## 2024-09-05 RX ORDER — ONDANSETRON HYDROCHLORIDE 2 MG/ML
4 INJECTION, SOLUTION INTRAVENOUS
Status: COMPLETED | OUTPATIENT
Start: 2024-09-05 | End: 2024-09-05

## 2024-09-05 RX ADMIN — SODIUM CHLORIDE 1000 ML: 9 INJECTION, SOLUTION INTRAVENOUS at 08:09

## 2024-09-05 RX ADMIN — DICYCLOMINE HYDROCHLORIDE 20 MG: 20 INJECTION, SOLUTION INTRAMUSCULAR at 08:09

## 2024-09-05 RX ADMIN — ONDANSETRON 4 MG: 2 INJECTION INTRAMUSCULAR; INTRAVENOUS at 08:09

## 2024-09-05 RX ADMIN — IOHEXOL 100 ML: 350 INJECTION, SOLUTION INTRAVENOUS at 10:09

## 2024-09-05 NOTE — ED PROVIDER NOTES
ED PROVIDER NOTE  9/5/2024    CHIEF COMPLAINT:   Chief Complaint   Patient presents with    Diarrhea     X1 week and now having lower abd cramping with any PO intake. No vomiting.        HISTORY OF PRESENT ILLNESS:   Nu Denny is a 79 y.o. female who presents with chief complaint Abdominal pain.  Onset was a week ago whenever she began having nonbloody diarrhea that she states is actually getting better and easing up but now over the past couple of days she was having some lower abdominal discomfort that she describes as sharp cramping pain that has aggravated with eating.  She does get some mild nausea whenever she eats but denies any vomiting.  She states she was not eaten much in the past couple of days due to it causing her to have abdominal pain and is starting to feel weak.  Denies fever, vomiting, melena, hematochezia, known sick contacts.    The history is provided by the patient.         REVIEW OF SYSTEMS: as noted in the HPI.  NURSING NOTES REVIEWED      PAST MEDICAL/SURGICAL HISTORY:   Past Medical History:   Diagnosis Date    Anxiety 06/13/2022    Cataract of left eye 06/13/2022    Central retinal vein occlusion of right eye 06/13/2022    Female bladder prolapse 06/13/2022    Gastroesophageal reflux disease 06/13/2022    Hyperlipidemia 06/13/2022    Hypertension 06/13/2022    Mild CAD     Osteopenia of neck of femur 06/13/2022    Urinary incontinence 06/13/2022    Zenker diverticulum       Past Surgical History:   Procedure Laterality Date    ANGIOGRAM, CORONARY, WITH LEFT HEART CATHETERIZATION N/A 6/28/2023    Procedure: Angiogram, Coronary, with Left Heart Cath;  Surgeon: Kaveh Morales MD;  Location: [facility] CATH LAB;  Service: Cardiology;  Laterality: N/A;    CHOLECYSTECTOMY      COLONOSCOPY  2017    HYSTERECTOMY      KNEE SURGERY Right     x2; states MD wanted to do replacement but pt declined    SHOULDER SURGERY  1995    TONSILLECTOMY      VITRECTOMY         FAMILY HISTORY:   Family History    Problem Relation Name Age of Onset    Hypertension Mother      No Known Problems Father      No Known Problems Sister      No Known Problems Brother      No Known Problems Maternal Aunt      No Known Problems Maternal Uncle      No Known Problems Paternal Aunt      No Known Problems Paternal Uncle      Diabetes Maternal Grandmother      No Known Problems Maternal Grandfather      No Known Problems Paternal Grandmother      No Known Problems Paternal Grandfather      Amblyopia Neg Hx      Blindness Neg Hx      Cancer Neg Hx      Cataracts Neg Hx      Glaucoma Neg Hx      Macular degeneration Neg Hx      Retinal detachment Neg Hx      Strabismus Neg Hx      Stroke Neg Hx      Thyroid disease Neg Hx         SOCIAL HISTORY:   Social History     Tobacco Use    Smoking status: Never     Passive exposure: Past    Smokeless tobacco: Never   Substance Use Topics    Alcohol use: Never    Drug use: Never       ALLERGIES:   Review of patient's allergies indicates:   Allergen Reactions    Codeine Rash       PHYSICAL EXAM:  Initial Vitals [09/05/24 0715]   BP Pulse Resp Temp SpO2   114/73 83 18 98.1 °F (36.7 °C) 95 %      MAP       --         Physical Exam    Nursing note and vitals reviewed.  Constitutional: She appears well-developed and well-nourished.   HENT:   Head: Normocephalic and atraumatic.   Mouth/Throat: Uvula is midline and mucous membranes are normal.   Eyes: EOM are normal. Pupils are equal, round, and reactive to light.   Neck: Trachea normal. Neck supple.   Cardiovascular:  Normal rate, regular rhythm and normal pulses.           Pulmonary/Chest: Effort normal and breath sounds normal.   Abdominal: Abdomen is soft. Bowel sounds are normal. There is generalized abdominal tenderness (mild). There is no rebound and no guarding.   Musculoskeletal:         General: Normal range of motion.      Cervical back: Neck supple.     Neurological: She is alert and oriented to person, place, and time. GCS eye subscore is 4.  GCS verbal subscore is 5. GCS motor subscore is 6.   Skin: Skin is warm and dry.   Psychiatric: She has a normal mood and affect. Her speech is normal. Thought content normal.         RESULTS:  Labs Reviewed   COMPREHENSIVE METABOLIC PANEL - Abnormal       Result Value    Sodium 141      Potassium 4.2      Chloride 111 (*)     CO2 20 (*)     Glucose 101      Blood Urea Nitrogen 13.2      Creatinine 0.78      Calcium 9.3      Protein Total 6.9      Albumin 3.6      Globulin 3.3      Albumin/Globulin Ratio 1.1      Bilirubin Total 1.2            ALT 20      AST 23      eGFR >60      Anion Gap 10.0      BUN/Creatinine Ratio 17     URINALYSIS, REFLEX TO URINE CULTURE - Abnormal    Color, UA Yellow      Appearance, UA Cloudy (*)     Specific Gravity, UA 1.022      pH, UA 5.5      Protein, UA 1+ (*)     Glucose, UA Normal      Ketones, UA Negative      Blood, UA Negative      Bilirubin, UA Negative      Urobilinogen, UA Normal      Nitrites, UA Negative      Leukocyte Esterase, UA 75 (*)     RBC, UA 0-5      WBC, UA 0-5      Bacteria, UA Occ (*)     Squamous Epithelial Cells, UA Few (*)     Mucous, UA Few (*)     Hyaline Casts, UA 3-5 (*)     Narrative:     POWDER (TALK)- FEW   CBC WITH DIFFERENTIAL - Abnormal    WBC 11.18      RBC 4.09 (*)     Hgb 12.5      Hct 38.2      MCV 93.4      MCH 30.6      MCHC 32.7 (*)     RDW 13.2      Platelet 267      MPV 10.0      Neut % 65.6      Lymph % 23.3      Mono % 9.4      Eos % 1.0      Basophil % 0.4      Lymph # 2.61      Neut # 7.34      Mono # 1.05      Eos # 0.11      Baso # 0.04      IG# 0.03      IG% 0.3      NRBC% 0.0     MAGNESIUM - Normal    Magnesium Level 2.30     CBC W/ AUTO DIFFERENTIAL    Narrative:     The following orders were created for panel order CBC auto differential.  Procedure                               Abnormality         Status                     ---------                               -----------         ------                     CBC with  Differential[[id number]]       Abnormal            Final result                 Please view results for these tests on the individual orders.     Imaging Results              CT Abdomen Pelvis With IV Contrast NO Oral Contrast (Final result)  Result time 09/05/24 10:25:14      Final result by Lopez Wolf MD (09/05/24 10:25:14)                   Impression:      Diverticulitis distal descending and sigmoid colon without gross perforation or drainable abscess.      Electronically signed by: Lopez Wolf  Date:    09/05/2024  Time:    10:25               Narrative:    EXAMINATION:  CT ABDOMEN PELVIS WITH IV CONTRAST    CLINICAL HISTORY:  LLQ abdominal pain;    TECHNIQUE:  Helical acquisition through the abdomen and pelvis with IV contrast.  Three plane reconstructions were provided for review. DLP 1069 mGycm. Automatic exposure control, adjustment of mA/kV or iterative reconstruction technique was used to reduce radiation.    COMPARISON:  No priors    FINDINGS:  The limited imaged lung bases are clear.    No significant abnormality of the liver, spleen, pancreas or adrenals.  Gallbladder surgically absent.  No hydronephrosis or suspicious renal lesion.    No bowel obstruction.  Normal appendix.  There is colonic diverticulosis with inflammatory changes along the distal descending and sigmoid colon.  No free air.    Urinary bladder is unremarkable. No free fluid. Aorta normal in caliber.  Mild atherosclerotic disease.    Moderate degenerative change of the spine.                        Wet Read by Arturo Diaz DO (09/05/24 10:20:18, [facility], Emergency Medicine)    No bowel obstruction.                                    PROCEDURES:  Procedures    ECG:       ED COURSE AND MEDICAL DECISION MAKING:  Medications   iohexoL (OMNIPAQUE 350) 350 mg iodine/mL injection (has no administration in time range)   dicyclomine injection 20 mg (20 mg Intramuscular Given 9/5/24 0821)   ondansetron  injection 4 mg (4 mg Intravenous Given 9/5/24 0821)   0.9%  NaCl infusion (0 mLs Intravenous Stopped 9/5/24 0920)   iohexoL (OMNIPAQUE 350) injection 100 mL (100 mLs Intravenous Given 9/5/24 1001)     ED Course as of 09/05/24 1044   Thu Sep 05, 2024   0850 WBC: 11.18 [IB]   0850 Hemoglobin: 12.5 [IB]   0850 Platelet Count: 267 [IB]   0850 Creatinine: 0.78 [IB]   0850 Magnesium : 2.30 [IB]   0859 NITRITE UA: Negative [IB]   0859 Leukocyte Esterase, UA(!): 75 [IB]   0859 RBC, UA: 0-5 [IB]   0859 WBC, UA: 0-5 [IB]   0859 Bacteria, UA(!): Occ [IB]   0859 Squamous Epithelial Cells, UA(!): Few [IB]   1030 Reports resolution of abdominal pain after IM Bentyl. [IB]      ED Course User Index  [IB] Arturo Diaz, DO        Medical Decision Making  79-year-old female who presents with diarrhea over the past week having some left lower quadrant abdominal pain.  Differential diagnosis includes colitis, diverticulitis, gastroenteritis, IBD.  CBC shows no leukocytosis or leukopenia or anemia.  CMP grossly unremarkable.  Magnesium normal.  CT abdomen and pelvis shows acute uncomplicated diverticulitis.  All labs and imaging discussed with the patient.  She reports relief after Zofran and Bentyl.  We will discharge with Augmentin, Zofran, and Bentyl and given GI referral.  Given strict ED return precautions. I have spoken with the patient and/or caregivers. I have explained the patient's condition, diagnoses and treatment plan based on the information available to me at this time. I have answered the patient's and/or caregiver's questions and addressed any concerns. The patient and/or caregivers have as good an understanding of the patient's diagnosis, condition and treatment plan as can be expected at this point. The vital signs have been stable. The patient's condition is stable and appropriate for discharge from the emergency department.     The patient will pursue further outpatient evaluation with the primary care physician or  other designated or consulting physician as outlined in the discharge instructions. The patient and/or caregivers are agreeable to this plan of care and follow-up instructions have been explained in detail. The patient and/or caregivers have received these instructions in written format and have expressed an understanding of the discharge instructions. The patient and/or caregivers are aware that any significant change in condition or worsening of symptoms should prompt an immediate return to this or the closest emergency department or a call to 911.    Amount and/or Complexity of Data Reviewed  Labs: ordered. Decision-making details documented in ED Course.  Radiology: ordered and independent interpretation performed.    Risk  OTC drugs.  Prescription drug management.        CLINICAL IMPRESSION:  1. Diverticulitis        DISPOSITION:   ED Disposition Condition    Discharge Stable            ED Prescriptions       Medication Sig Dispense Start Date End Date Auth. Provider    amoxicillin-clavulanate 875-125mg (AUGMENTIN) 875-125 mg per tablet Take 1 tablet by mouth 2 (two) times daily. 14 tablet 9/5/2024 -- Arturo Diaz DO    dicyclomine (BENTYL) 20 mg tablet Take 1 tablet (20 mg total) by mouth 4 (four) times daily as needed (abdominal pain). 120 tablet 9/5/2024 -- Arturo Diaz DO    ondansetron (ZOFRAN-ODT) 4 MG TbDL Take 1 tablet (4 mg total) by mouth every 8 (eight) hours as needed (nausea and vomiting). 14 tablet 9/5/2024 -- Arturo Diaz DO          Follow-up Information       Follow up With Specialties Details Why Contact Info    Carey Gonzalez DO Family Medicine Schedule an appointment as soon as possible for a visit   [address] [facility] [address]  [phone]      [facility] Emergency Medicine  If symptoms worsen [address] [facility] [place] [id number] [phone]               Arturo Diaz DO  09/05/24 1044

## 2024-09-10 ENCOUNTER — TELEPHONE (OUTPATIENT)
Dept: GASTROENTEROLOGY | Facility: CLINIC | Age: 79
End: 2024-09-10
Payer: MEDICARE

## 2024-09-10 NOTE — TELEPHONE ENCOUNTER
----- Message from Aurelia Bennett sent at 9/9/2024  9:55 AM CDT -----  Regarding: Referral  Pt stated she was scoped @ [facility] in 2023.

## 2024-09-20 ENCOUNTER — OFFICE VISIT (OUTPATIENT)
Dept: FAMILY MEDICINE | Facility: CLINIC | Age: 79
End: 2024-09-20
Payer: MEDICARE

## 2024-09-20 VITALS
HEIGHT: 60 IN | OXYGEN SATURATION: 97 % | TEMPERATURE: 98 F | WEIGHT: 185 LBS | HEART RATE: 71 BPM | SYSTOLIC BLOOD PRESSURE: 136 MMHG | DIASTOLIC BLOOD PRESSURE: 64 MMHG | BODY MASS INDEX: 36.32 KG/M2

## 2024-09-20 DIAGNOSIS — K91.1 DUMPING SYNDROME: ICD-10-CM

## 2024-09-20 DIAGNOSIS — K57.92 DIVERTICULITIS: Primary | ICD-10-CM

## 2024-09-20 PROBLEM — R91.8 PULMONARY NODULES: Status: ACTIVE | Noted: 2024-09-20

## 2024-09-20 PROCEDURE — 99215 OFFICE O/P EST HI 40 MIN: CPT | Mod: PBBFAC

## 2024-09-20 RX ORDER — AMOXICILLIN AND CLAVULANATE POTASSIUM 875; 125 MG/1; MG/1
1 TABLET, FILM COATED ORAL 2 TIMES DAILY
Qty: 14 TABLET | Refills: 0 | Status: SHIPPED | OUTPATIENT
Start: 2024-09-20

## 2024-09-20 NOTE — PROGRESS NOTES
[facility] [place] [facility] OFFICE VISIT NOTE  Nu Denny  [id number]  09/20/2024    Chief Complaint   Patient presents with    Follow-up       Nu Denny is a 79 y.o. female  presenting to [facility] for diverticulitis.    History of present Illness    Diverticulitis: evaluated by ED on 9/5/2024. Presented with a one week hx of diarrhea, non bloody. Completed antibiotics Thursday 9/12. Since completion of antibiotics, she has experienced intermittent cramping and loose stools depending on the meal.    Fecal incontinence: x multiple years, unable to eat while traveling. Would eat foods then 30 minutes later would have incontinence. S/p cholecystectomy in 2018.    Review of Systems   Constitutional:  Negative for fever.   Gastrointestinal:  Positive for abdominal pain and diarrhea. Negative for abdominal distention, blood in stool, constipation, nausea and vomiting.       Vitals:    09/20/24 0838 09/20/24 0959   BP: (!) 142/68 136/64   Pulse: 71    Temp: 97.9 °F (36.6 °C)    TempSrc: Oral    SpO2: 97%    Weight: 83.9 kg (185 lb)    Height: 5' (1.524 m)       Physical Exam  Vitals reviewed.   Constitutional:       Appearance: Normal appearance.   HENT:      Head: Normocephalic and atraumatic.   Eyes:      Extraocular Movements: Extraocular movements intact.      Conjunctiva/sclera: Conjunctivae normal.   Cardiovascular:      Rate and Rhythm: Normal rate and regular rhythm.      Heart sounds: Normal heart sounds.   Pulmonary:      Breath sounds: Normal breath sounds.   Abdominal:      General: Bowel sounds are normal. There is no distension.      Palpations: Abdomen is soft.      Tenderness: There is no abdominal tenderness. There is no guarding.   Skin:     General: Skin is warm and dry.   Neurological:      General: No focal deficit present.      Mental Status: She is alert.   Psychiatric:         Mood and Affect: Mood normal.            Current Medications:   Current Outpatient Medications on File Prior to Visit   Medication Sig  Dispense Refill    acetaminophen (TYLENOL) 650 MG TbSR Take 650 mg by mouth every 8 (eight) hours.      APPLE CIDER VINEGAR ORAL Take by mouth.      aspirin (ECOTRIN) 81 MG EC tablet Take 81 mg by mouth once daily.      atorvastatin (LIPITOR) 20 MG tablet Take 1 tablet (20 mg total) by mouth once daily. 90 tablet 3    b complex vitamins capsule Take 1 capsule by mouth once daily.      calcium carbonate (OS-SREEDHAR) 500 mg calcium (1,250 mg) chewable tablet Take 1 tablet by mouth once daily.      dicyclomine (BENTYL) 20 mg tablet Take 1 tablet (20 mg total) by mouth 4 (four) times daily as needed (abdominal pain). 120 tablet 0    docusate sodium (COLACE) 100 MG capsule Take 100 mg by mouth once daily at 6am.      famotidine (PEPCID) 20 MG tablet Take 1 tablet (20 mg total) by mouth 2 (two) times daily. 180 tablet 0    losartan (COZAAR) 50 MG tablet Take 1 tablet (50 mg total) by mouth once daily. 90 tablet 3    metroNIDAZOLE (METROGEL) 0.75 % gel Apply topically 2 (two) times daily. Apply to face PRN for rosacea related rash 45 g 1    multivitamin capsule Take 1 capsule by mouth once daily.      ondansetron (ZOFRAN-ODT) 4 MG TbDL Take 1 tablet (4 mg total) by mouth every 8 (eight) hours as needed (nausea and vomiting). 14 tablet 0    sertraline (ZOLOFT) 50 MG tablet Take 1 tablet (50 mg total) by mouth once daily. 90 tablet 0    vitamin D (VITAMIN D3) 1000 units Tab Take 1,000 Units by mouth once daily.      [DISCONTINUED] amoxicillin-clavulanate 875-125mg (AUGMENTIN) 875-125 mg per tablet Take 1 tablet by mouth 2 (two) times daily. 14 tablet 0     No current facility-administered medications on file prior to visit.        Assessment:   1. Diverticulitis  Not resolved  Continue antibiotics for an additional 1 week  Continue dicyclomine and zofran as needed  To discuss with GI abut sooner appointment to be evaluated  Discussed in depth anti-inflammatory diet and to avoid nuts and needs  Handout provided    2. Dumping  syndrome   Likely due to cholecystectomy  Patient declined medication for treatment at this time  Handout provided for pelvic floor exercises  Will trial dicyclomine 30 minutes prior to meal while at home to evaluate if that will help with incontinence    Orders Placed This Encounter    amoxicillin-clavulanate 875-125mg (AUGMENTIN) 875-125 mg per tablet       Follow up in about 17 days (around 10/7/2024) for with me at 9:00 am for diverticulitis.     Carey Gonzalez DO  [employer]  Resident, [facility]

## 2024-09-20 NOTE — PATIENT INSTRUCTIONS
Inflammatory Foods  What causes inflammation? It can result from exposure to environmental toxins, a lingering virus, aging or chronic stress. But what you eat contributes, too.  What foods cause inflammation? Unfortunately, a lot of them. In particular, experts recommend avoiding these inflammatory foods:  Red meat, such as steak and hamburgers  Processed meat, such as bologna, anguiano, sausage and lunchmeat  Commercial baked goods such as snack cakes, pies, cookies and brownies  Bread and pasta made with white flour  Deep fried items such as French fries, fried chicken and donuts  Foods high in added sugar, such as candy, jelly and syrup  Sugar-sweetened beverages such as soda, bottled or canned tea drinks, and sports drinks  Trans fats, found in margarine, microwave popcorn, refrigerated biscuits and dough, and nondairy coffee creamers  Cooking methods can make a difference  When you want to reduce inflammation, baking, steaming or fast stir-frying are preferable to deep frying or grilling.  Cooking meat, especially red meat, on the grill creates compounds associated with cancer. Meat on the grill can drip fat onto the flames and release these compounds, which can end up in the food on your plate. The same is not true of grilled vegetables or low-fat fish, which are safe (and delicious) on the grill.  And dont feel bad about resorting to the microwave when youre short on time. Microwaving cooks and heats by activating water molecules, and it is actually healthier than frying or grilling at high heat.  Also, bear in mind that the benefit of healthy items like fish and vegetables can be reversed if you are not careful with sauces and dressings. Many of these condiments and extras are high in inflammatory ingredients such as sugar and trans fat as well as sodium.  Processed Foods: Read the Label  Its important to check the ingredients list of prepared or processed foods. Many prepared foods contain hidden sugar. It  can be hard to tell, since sugar tends to assume aliases: By some estimates, there are over 50 names for added sugar in prepared commercial foods, such as cane crystals and crystalized cane juice, syrups and many ingredient names that ends in ose (chemical shorthand for sugar).  Reading food labels is important. For instance, any food that lists partially hydrogenated oils as an ingredient should be avoided since these are trans fats.  Anti-inflammatory Foods  While there isnt one specific anti-inflammatory diet, experts say overall healthy eating patterns can help you get rid of inflammation and stay healthier. In particular, they recommend some food types that can help bring inflammation down:  Omega-3 Fatty Acids  One form of these powerful inflammation fighters is found in fatty fish such as salmon, herring, mackerel, sardines, tuna, striped bass and anchovies. You can get the benefit from eating the fish or by taking fish oil supplements.  Vegetarians and vegans have options, too. Another form of omega-3 is plant based. Eating nuts and seeds and cooking with canola oil can supply these nutrients and vitamin E, another inflammation fighter.  Vitamin C  Ascorbic acid, also known as vitamin C, is a powerful antioxidant. Antioxidants help address cellular wear and tear that can set off inflammation.  Youll find vitamin C in fruits and vegetables, which are the basis of a healthy diet. In addition to well-known vitamin C sources such as citrus fruit and juice, tasty bell peppers are also packed with the vitamin and may yield fewer calories.  Polyphenols  There is a reason why the Mediterranean diet and other healthy eating plans stress colorful, plant-based foods, whole grains and olive oil.  Polyphenols are naturally occurring compounds in these foods that protect the body from inflammation.  And you can get polyphenol power from your favorite pick-me-up: Coffee, tea and even dark chocolate are rich in  these beneficial compounds.  Gut-Healthy Foods  A healthy population of beneficial bacteria (rosemary) in the intestines can help keep inflammation at bay. To cultivate healthy intestinal rosemary, enjoy plenty of foods rich in probiotics and prebiotics.  But keep in mind: Not all fermented foods have probiotics. Check the label and ensure that live microorganisms are listed in the ingredients. Yogurt and cottage cheese will have live active cultures noted on the packaging.  Prebiotics, in the form of dietary fiber, are like food for the helpful bacteria. [place] artichokes and foods rich in inulin, such as asparagus, bananas and chicory, help keep the beneficial rosemary healthy and plentiful.

## 2024-09-21 NOTE — PROGRESS NOTES
"I reviewed History, PE, A/P and medical record.  Services provided in outpatient department of a teaching hospital/facility, I was immediately available.  I agree with resident, care reasonable and necessary with any exceptions stated below.  I evaluated the patient with resident at time of visit, participated in key parts of H/P and management was discussed.      Mammo 11/2023  PAP - s/p "total hyst" 1972, records not available, chart s staest cervix present but ?  Recent pelvic  DEXA 2021, ordered but not scheduled, noted contraindication (?)to Fosamax when Prolia started  CRC: - 11/2023 colonoscopy, DR Castellanos [facility] - internal hemorrhoids/tics,  last one per guidelines, 2017 - inadequate,+polyp   EGD - none,denies dysphagia  Never smoker  LDL 85  [facility] 6/2023 30% LAD, nonobstructive disease    Care Team  [facility] CARD, Ophto, GI, DR Castellanos- GI,     Pt is fully independent and does well  Offer PCV 20 at FU  See if DEXA can get scheduled (open order)  Urge pelvic exam if LLQ abd pain persists, extending abx now but may need alternate regimen and repeat CT if not clear, pelvic exam, urine culture if pain persists if patient willing, would obtain urine culture    Pt lost 5-10 lbs over last 6 months, she reports initially this was intentional and then more with recent illness    Personally discussed h/o diarrhea, treatment options as above, currently does not go to est anywhere but is comfortable with this, denies prior stool eval, biopsied for microscopic colitis not done at 2023 colonoscopy but some ARB have need associated, she is more afraid of being constipated ( on colace)    Needs advance directives discussion    Deborah Kunz MD  [facility] - [facility]    "

## 2024-09-27 ENCOUNTER — OFFICE VISIT (OUTPATIENT)
Dept: OPHTHALMOLOGY | Facility: CLINIC | Age: 79
End: 2024-09-27
Payer: MEDICARE

## 2024-09-27 VITALS — BODY MASS INDEX: 36.31 KG/M2 | HEIGHT: 60 IN | WEIGHT: 184.94 LBS

## 2024-09-27 DIAGNOSIS — H35.373 EPIRETINAL MEMBRANE (ERM) OF BOTH EYES: ICD-10-CM

## 2024-09-27 DIAGNOSIS — H34.8112: Primary | ICD-10-CM

## 2024-09-27 DIAGNOSIS — H34.8110 CENTRAL RETINAL VEIN OCCLUSION WITH MACULAR EDEMA OF RIGHT EYE: ICD-10-CM

## 2024-09-27 DIAGNOSIS — H25.12 AGE-RELATED NUCLEAR CATARACT OF LEFT EYE: ICD-10-CM

## 2024-09-27 DIAGNOSIS — H04.123 DRY EYE SYNDROME OF BOTH EYES: ICD-10-CM

## 2024-09-27 PROCEDURE — 99213 OFFICE O/P EST LOW 20 MIN: CPT | Mod: PBBFAC,PN | Performed by: STUDENT IN AN ORGANIZED HEALTH CARE EDUCATION/TRAINING PROGRAM

## 2024-09-27 NOTE — PROGRESS NOTES
HPI    4 months DFE, OCT, NVD check OD       Last edited by Rehana Minaya MA on 9/27/2024  9:10 AM.            Assessment /Plan     For exam results, see Encounter Report.    Stable hemispheric central retinal vein occlusion (CRVO) of right eye    Central retinal vein occlusion with macular edema of right eye    Dry eye syndrome of both eyes    Age-related nuclear cataract of left eye    Epiretinal membrane (ERM) of both eyes          9/27/24 octm cystic irf diffusely, 393, sl improved from prior// 330 erm no fluid, okay contour     OCT Mac 5/23/24  OD: 414 um, large cystic IRF diffusely  OS: 346 um, stable ERM, no IRF/SRF     IVFA 11/30/22: overexposed image ou, prp scars and expected disc staining. No leakage ou     ==================    1) CRVO OD with CME  - Unclear timing although documented 10/2019  - IVFA unable to be done due to poor venous access  - Carotid showed <50% stenosis OU.  - Lasers: PRP OD 2/16/22, with fill in 03/16/22, 7/20/22  INJECTION LOG  - Avastin (10/2019)- no response, ARYA 2 (7/14/2020) - poor response. 1/31/22 for NVD  - Ozurdex (12/4/2019) - worked great previously  - Eylea 3/3 (8/19/2020, 9/17/2020, 10/16/20  - Plan most of 2020 was to proceed with ozurdex injection if no improvement in mac edema. After discussion with Dr. Greer, does not think ozurdex would provide significant benefit over triessence. Also, there is concern that vein occlusion has worsened given lack of improvement and significant worsening of edema and vision over past 5 months at end of 2020.  - Patient elected to stop injections in 2020 given she has not had significant vision changes with injections  - Exam 1/31/22 New NVD & frond of NVE inferotemporally.   - 11/15/22; no NVI or NVA; odd NVD vs collaterals noted OD.  Needs IVFA prior to continued injections or laser.  IOP and vision stable  - IVFA 11/30/22: overexposed image but no leakage ou. Will start spacing out visits as long as she is stable  - 5/23/24  Still with stable OCT macula. Stable vision. Stable dilated examination. Continue q4 month examinations.   9/27/24: no nvi, no nva. Optos done collaterals inferior on disc no nv seen. Room for prp fill if needed. Will space to q6 months for now.     2) Age-related nuclear cataract, left eye  - High threshold for surgery given functionally monocular, cleared by Ret[facility] for surgery  - Last MRX to 20/30 1/19/24 (polycarb lenses)  - Monitor    3) ERM, OU  - H/o ERM OD s/p 25g PPV, ERM/ILM peel with FAx 5/23/17. Stable  - H/o ERM OS, NVS, OCT mac stable  - Monitor    4) Pseudophakia of right eye  - Done 4/24/12 by Dr. Little, SN60WF 18.5D  - Monitor    5) Posterior vitreous detachment, left eye  - No holes/tears/breaks on DFE  - RD precautions    6) Dry eye syndrome of both lacrimal glands  - AT 4-6x/day as needed, lid hygiene    RTC 6 months dfe/ nvi check prior to dilation OD

## 2024-10-07 ENCOUNTER — OFFICE VISIT (OUTPATIENT)
Dept: FAMILY MEDICINE | Facility: CLINIC | Age: 79
End: 2024-10-07
Payer: MEDICARE

## 2024-10-07 VITALS
WEIGHT: 183.38 LBS | TEMPERATURE: 98 F | HEART RATE: 61 BPM | DIASTOLIC BLOOD PRESSURE: 73 MMHG | RESPIRATION RATE: 20 BRPM | SYSTOLIC BLOOD PRESSURE: 129 MMHG | OXYGEN SATURATION: 99 % | BODY MASS INDEX: 36 KG/M2 | HEIGHT: 60 IN

## 2024-10-07 DIAGNOSIS — Z23 INFLUENZA VACCINE ADMINISTERED: ICD-10-CM

## 2024-10-07 DIAGNOSIS — K57.92 DIVERTICULITIS: Primary | ICD-10-CM

## 2024-10-07 DIAGNOSIS — Z28.20 VACCINE REFUSED BY PATIENT: ICD-10-CM

## 2024-10-07 DIAGNOSIS — Z53.20 MAMMOGRAM DECLINED: ICD-10-CM

## 2024-10-07 PROCEDURE — 99215 OFFICE O/P EST HI 40 MIN: CPT | Mod: PBBFAC

## 2024-10-07 NOTE — PROGRESS NOTES
[facility] OFFICE VISIT NOTE  Nu Denny  [id number]  10/07/2024    Chief Complaint   Patient presents with    Diverticulitis    Follow-up       Nu Denny is a 79 y.o. female  presenting to [facility] for diverticulitis follow up.    History of present Illness    Diverticulitis: ED visit on 9/5 and received 7 day course of augmentin. Symptoms had not improved by 9/20 office visit so an addditional 7 day course was prescribed. Completed antibiotics 9/27 or 9/28. Denies abominal pain, stools soft and somewhat formed.     Requesting flu shot:  denies fever, chills or reactions to prior flu vaccines.     Health Maintenance  Cervical Cancer: cervix present. Denies hx of abnormal pap smear in past. Declines further pap smears.   Breast Cancer: bi-rads 1 bilaterally  Colon Cancer:  10/2023 colonoscopy - internal hemorrhoids/diverticulosis, last colonoscopy per guidelines  Osteoporosis screening: osteopenia -1.3 mean femur density. Taking prolia began 6/2022. Next dose scheduled for 2/2/2025. Repeat dexa scan due, Patient required to call and schedule appointment. Number provided    Review of Systems   Constitutional:  Negative for fatigue and fever.   Gastrointestinal:  Negative for abdominal pain, blood in stool, constipation, diarrhea, nausea and vomiting.       Vitals:    10/07/24 0855   BP: 129/73   Pulse: 61   Resp: 20   Temp: 97.6 °F (36.4 °C)   TempSrc: Oral   SpO2: 99%   Weight: 83.2 kg (183 lb 6.4 oz)   Height: 5' (1.524 m)        Physical Exam  Vitals reviewed.   Constitutional:       Appearance: Normal appearance.   HENT:      Head: Normocephalic and atraumatic.   Eyes:      Extraocular Movements: Extraocular movements intact.      Conjunctiva/sclera: Conjunctivae normal.   Cardiovascular:      Rate and Rhythm: Normal rate and regular rhythm.      Heart sounds: Normal heart sounds.   Pulmonary:      Effort: Pulmonary effort is normal.      Breath sounds: Normal breath sounds.   Skin:     General: Skin is  warm and dry.   Neurological:      General: No focal deficit present.      Mental Status: She is alert.   Psychiatric:         Mood and Affect: Mood normal.          Current Medications:   Current Outpatient Medications   Medication Instructions    acetaminophen (TYLENOL) 650 mg, Oral, Every 8 hours    amoxicillin-clavulanate 875-125mg (AUGMENTIN) 875-125 mg per tablet 1 tablet, Oral, 2 times daily    APPLE CIDER VINEGAR ORAL Oral    aspirin (ECOTRIN) 81 mg, Oral, Daily    atorvastatin (LIPITOR) 20 mg, Oral, Daily    b complex vitamins capsule 1 capsule, Oral, Daily    calcium carbonate (OS-SREEDHAR) 500 mg calcium (1,250 mg) chewable tablet 1 tablet, Oral, Daily    docusate sodium (COLACE) 100 mg, Oral, Daily    famotidine (PEPCID) 20 mg, Oral, 2 times daily    losartan (COZAAR) 50 mg, Oral, Daily    metroNIDAZOLE (METROGEL) 0.75 % gel Topical (Top), 2 times daily, Apply to face PRN for rosacea related rash    multivitamin capsule 1 capsule, Oral, Daily    ondansetron (ZOFRAN-ODT) 4 mg, Oral, Every 8 hours PRN    sertraline (ZOLOFT) 50 mg, Oral, Daily    vitamin D (VITAMIN D3) 1,000 Units, Oral, Daily        Assessment:   1. Diverticulitis  Clinically resolved  Continue to adhere to antiinflammatory diet  Avoid seeds, nuts, or popcorn    2. Influenza vaccine administered  Influenza vaccine administered today    3. Mammogram declined  Declined at this time    4. Vaccine refused by patient   Patient declined PCV 20 vaccine at this time    Orders Placed This Encounter    influenza (adjuvanted) (Fluad) 45 mcg/0.5 mL IM vaccine (> or = 66 yo) 0.5 mL       Follow up in about 4 months (around 2/7/2025) for osteoporosis, prolia injection.     Carey Gonzalez DO  [employer]  Resident, -II

## 2024-10-23 NOTE — PROGRESS NOTES
CHIEF COMPLAINT:   Chief Complaint   Patient presents with    f/u denies chest pain or sob since LV no question                                                   HPI:  Nu Denny 79 y.o. female with PMH of Nonobstructive CAD per [facility] 6.28.23, HTN, HLD, GERD, osteopenia, chronic neck pain who presents to cardiology clinic for routine follow-up and ongoing care.  Due to previous complaints of chest pain, the patient completed a Nuclear Stress Test on 5.19.23 that was abnormal, revealing a moderate intensity, small to moderate-sized reversible perfusion defect in the LAD territory.  She underwent a subsequent Left Heart Catheterization on 6.28.23 that revealed nonobstructive coronary artery disease (30% mid LAD stenosis).  Latest Echocardiogram obtained on 1.24.23 revealed a preserved EF- 65%, Grade I Diastolic Dysfunction. (See reports below).     Patient presents to clinic today reporting that she feels well overall.  She denies any cardiovascular complaints of chest pain, shortness of breath, palpitations, orthopnea, PND, claudication, lightheadedness, dizziness, near-syncope, syncope or recent falls.  She still continues to endorse occasional right ankle edema secondary to bone spurs but notes overall improvement with leg elevation.  She is able to perform her routine ADLs and housework without experiencing any angina or angina equivalent symptoms.  She states that she also occasionally lets her dogs out without any cardiac issues.  She does not participate in any outside work per request of her children but states she does occasionally sweeps her outside ramps.  She remains compliant with her current medications and is currently tolerating without issue.                                                                                                                                                                                                                                                                                                                                                                                                                                                                            CARDIAC TESTING:    Echo 1.24.23    Interpretation Summary  · The left ventricle is normal in size with concentric remodeling and normal systolic function.  · The estimated ejection fraction is 65%.  · Grade I left ventricular diastolic dysfunction.  · Normal right ventricular size with normal right ventricular systolic function.  · There is no pulmonary hypertension.  · The estimated PA systolic pressure is 25 mmHg.  · Normal central venous pressure (3 mmHg).        Nuclear Stress - Cardiology Interpreted 5.19.23    Interpretation Summary    Abnormal myocardial perfusion scan.    There is a moderate intensity, small to moderate sized, reversible perfusion abnormality that is consistent with ischemia in the distal to apical apical wall(s) in the typical distribution of the LAD territory.    There are no other significant perfusion abnormalities.    The gated perfusion images showed an ejection fraction of 70% at rest. The gated perfusion images showed an ejection fraction of 80% post stress.    The patient reported no chest pain during the stress test.    There were no arrhythmias during stress.    On the nuclear stress test the EF is normal.  If the patient has an echo, the EF on the echo is considered more accurate.    The patient has a low to moderate risk nuclear stress test.    If patient is symptomatic, consider management with two anti-anginals      Cardiac catheterization  6.28.23    Conclusion    The pre-procedure left ventricular end diastolic pressure was 18.    The estimated blood loss was <50 mL.    There was non-obstructive coronary artery disease..    FINDINGS  LVEDP:  18 mmHg  Left Main:  No stenosis  LAD:   30% mid LAD stenosis  Circumflex:  No stenosis  RCA:  No stenosis  The patient has   Non-obstructive CAD  Blood loss:  less than 50 cc.    RECOMMENDATIONS  Medical management  Risk factor modifications -- smoking cessation  Activity -- avoid straining with affected limb for one week  Exercise on regular basis.  Precautions post D/C -- come to ER for hematoma, unusual pain, erythema, or unusual drainage at access site  Precautions post D/C -- if develop bleeding or hematoma at access site, hold pressure at access site, and come to ER        The procedure log was documented by Documenter: Janet Pryor RN and verified by Spencer Rae MD.    Date: 6/28/2023  Time: 8:15 AM       Patient Active Problem List   Diagnosis    Hypertension    Hyperlipidemia    Anxiety    Gastroesophageal reflux disease    Osteopenia of neck of femur    Central retinal vein occlusion of right eye    Cataract of left eye    Urinary incontinence    Female bladder prolapse    Herpes zoster oticus    Pulmonary nodules    Coronary artery disease involving native coronary artery of native heart without angina pectoris     Past Surgical History:   Procedure Laterality Date    ANGIOGRAM, CORONARY, WITH LEFT HEART CATHETERIZATION N/A 6/28/2023    Procedure: Angiogram, Coronary, with Left Heart Cath;  Surgeon: Kaveh Morales MD;  Location: [facility] CATH LAB;  Service: Cardiology;  Laterality: N/A;    CHOLECYSTECTOMY      COLONOSCOPY  2017    HYSTERECTOMY      KNEE SURGERY Right     x2; states MD wanted to do replacement but pt declined    SHOULDER SURGERY  1995    TONSILLECTOMY      VITRECTOMY       Social History     Socioeconomic History    Marital status:    Tobacco Use    Smoking status: Never     Passive exposure: Past    Smokeless tobacco: Never   Substance and Sexual Activity    Alcohol use: Never    Drug use: Never        Family History   Problem Relation Name Age of Onset    Hypertension Mother      No Known Problems Father      No Known Problems Sister      No Known Problems Brother      No Known Problems  Maternal Aunt      No Known Problems Maternal Uncle      No Known Problems Paternal Aunt      No Known Problems Paternal Uncle      Diabetes Maternal Grandmother      No Known Problems Maternal Grandfather      No Known Problems Paternal Grandmother      No Known Problems Paternal Grandfather      Amblyopia Neg Hx      Blindness Neg Hx      Cancer Neg Hx      Cataracts Neg Hx      Glaucoma Neg Hx      Macular degeneration Neg Hx      Retinal detachment Neg Hx      Strabismus Neg Hx      Stroke Neg Hx      Thyroid disease Neg Hx       Review of patient's allergies indicates:   Allergen Reactions    Codeine Rash         ROS:  Review of Systems   Constitutional: Negative.    HENT: Negative.     Eyes: Negative.    Respiratory: Negative.  Negative for shortness of breath.    Cardiovascular: Negative.    Gastrointestinal: Negative.    Genitourinary: Negative.    Musculoskeletal: Negative.    Skin: Negative.    Neurological: Negative.    Endo/Heme/Allergies: Negative.    Psychiatric/Behavioral: Negative.                                                                                                                                                                                  Negative except as stated in the history of present illness. See HPI for details.    PHYSICAL EXAM:  Visit Vitals  /76 (BP Location: Left forearm, Patient Position: Sitting)   Pulse 70   Temp 97.5 °F (36.4 °C) (Oral)   Resp 20   Ht 5' (1.524 m)   Wt 83.1 kg (183 lb 3.2 oz)   SpO2 100%   BMI 35.78 kg/m²         Physical Exam  HENT:      Head: Normocephalic.      Nose: Nose normal.   Eyes:      Pupils: Pupils are equal, round, and reactive to light.   Cardiovascular:      Rate and Rhythm: Normal rate and regular rhythm.   Pulmonary:      Effort: Pulmonary effort is normal.   Abdominal:      General: Abdomen is flat. Bowel sounds are normal.      Palpations: Abdomen is soft.   Musculoskeletal:         General: Normal range of motion.       Cervical back: Normal range of motion.   Skin:     General: Skin is warm.   Neurological:      General: No focal deficit present.      Mental Status: She is alert.   Psychiatric:         Mood and Affect: Mood normal.       Current Outpatient Medications   Medication Instructions    acetaminophen (TYLENOL ARTHRITIS ORAL) 2 tablets, 3 times daily    acetaminophen (TYLENOL) 650 mg, Every 8 hours    amoxicillin-clavulanate 875-125mg (AUGMENTIN) 875-125 mg per tablet 1 tablet, Oral, 2 times daily    APPLE CIDER VINEGAR ORAL Take by mouth.    aspirin (ECOTRIN) 81 mg, Daily    atorvastatin (LIPITOR) 20 mg, Oral, Daily    b complex vitamins capsule 1 capsule, Daily    calcium carbonate (OS-SREEDHAR) 500 mg calcium (1,250 mg) chewable tablet 1 tablet, Daily    dicyclomine (BENTYL) 10 mg, As needed (PRN)    docusate sodium (COLACE) 100 mg, Daily    famotidine (PEPCID) 20 mg, Oral, 2 times daily    loratadine (CLARITIN) 10 mg, Daily    losartan (COZAAR) 50 mg, Oral, Daily    metroNIDAZOLE (METROGEL) 0.75 % gel Topical (Top), 2 times daily, Apply to face PRN for rosacea related rash    multivitamin capsule 1 capsule, Daily    ondansetron (ZOFRAN-ODT) 4 mg, Oral, Every 8 hours PRN    sertraline (ZOLOFT) 50 mg, Oral, Daily    vitamin D (VITAMIN D3) 1,000 Units, Daily        All medications, laboratory studies, cardiac diagnostic imaging reviewed.     Lab Results   Component Value Date    LDL 85.00 08/02/2024    LDL 98.00 03/31/2023    TRIG 125 08/02/2024    TRIG 105 03/31/2023    CREATININE 0.78 09/05/2024    MG 2.30 09/05/2024    K 4.2 09/05/2024        ASSESSMENT/PLAN:  Nonobstructive CAD  - LHC- nonobstructive CAD, 30% mid LAD stenosis ( 6.28.23)  - EF -65% per ECHO 1.24.23  - Denies CP or SOB  - Continue ASA, Atorvastatin 20 mg, and losartan 50 mg  - Advised on heart healthy, low-cholesterol diet and activity as tolerated  - EKG today - no acute ischemic changes    HTN  - BP at goal 120/76  - Continue current medications losartan  50 mg for now  - Instructed patient to monitor and log blood pressure and call with readings in 2 weeks  - Advised on low salt diet and activity as tolerated     HLD  - LDL-85- at goal   - Continue Atorvastatin 20 mg   - Advised on low-cholesterol, low-fat diet and exercise as tolerated    EKG  Follow up in cardiology clinic in 6 months or sooner if needed  Follow up with PCP as directed

## 2024-10-25 ENCOUNTER — HOSPITAL ENCOUNTER (OUTPATIENT)
Dept: RADIOLOGY | Facility: HOSPITAL | Age: 79
Discharge: HOME | End: 2024-10-25
Payer: MEDICARE

## 2024-10-25 ENCOUNTER — OFFICE VISIT (OUTPATIENT)
Dept: CARDIOLOGY | Facility: CLINIC | Age: 79
End: 2024-10-25
Payer: MEDICARE

## 2024-10-25 VITALS
WEIGHT: 183.19 LBS | BODY MASS INDEX: 35.96 KG/M2 | SYSTOLIC BLOOD PRESSURE: 120 MMHG | HEART RATE: 70 BPM | HEIGHT: 60 IN | TEMPERATURE: 98 F | DIASTOLIC BLOOD PRESSURE: 76 MMHG | OXYGEN SATURATION: 100 % | RESPIRATION RATE: 20 BRPM

## 2024-10-25 DIAGNOSIS — M85.851 OSTEOPENIA OF NECKS OF BOTH FEMURS: ICD-10-CM

## 2024-10-25 DIAGNOSIS — E78.2 MIXED HYPERLIPIDEMIA: ICD-10-CM

## 2024-10-25 DIAGNOSIS — I10 PRIMARY HYPERTENSION: ICD-10-CM

## 2024-10-25 DIAGNOSIS — I25.10 CORONARY ARTERY DISEASE INVOLVING NATIVE CORONARY ARTERY OF NATIVE HEART WITHOUT ANGINA PECTORIS: Primary | ICD-10-CM

## 2024-10-25 DIAGNOSIS — M85.852 OSTEOPENIA OF NECKS OF BOTH FEMURS: ICD-10-CM

## 2024-10-25 LAB
OHS QRS DURATION: 86 MS
OHS QTC CALCULATION: 414 MS

## 2024-10-25 PROCEDURE — 77080 DXA BONE DENSITY AXIAL: CPT | Mod: TC

## 2024-10-25 PROCEDURE — 93005 ELECTROCARDIOGRAM TRACING: CPT

## 2024-10-25 PROCEDURE — 99215 OFFICE O/P EST HI 40 MIN: CPT | Mod: PBBFAC,25 | Performed by: NURSE PRACTITIONER

## 2024-10-25 RX ORDER — DICYCLOMINE HYDROCHLORIDE 10 MG/1
10 CAPSULE ORAL
COMMUNITY

## 2024-10-25 RX ORDER — LORATADINE 10 MG/1
10 TABLET ORAL DAILY
COMMUNITY

## 2024-12-12 ENCOUNTER — OFFICE VISIT (OUTPATIENT)
Dept: FAMILY MEDICINE | Facility: CLINIC | Age: 79
End: 2024-12-12
Payer: MEDICARE

## 2024-12-12 ENCOUNTER — HOSPITAL ENCOUNTER (OUTPATIENT)
Dept: RADIOLOGY | Facility: HOSPITAL | Age: 79
Discharge: HOME | End: 2024-12-12
Payer: MEDICARE

## 2024-12-12 VITALS
RESPIRATION RATE: 20 BRPM | WEIGHT: 184 LBS | HEART RATE: 77 BPM | OXYGEN SATURATION: 99 % | TEMPERATURE: 98 F | SYSTOLIC BLOOD PRESSURE: 134 MMHG | DIASTOLIC BLOOD PRESSURE: 68 MMHG | HEIGHT: 60 IN | BODY MASS INDEX: 36.12 KG/M2

## 2024-12-12 DIAGNOSIS — M79.671 RIGHT FOOT PAIN: ICD-10-CM

## 2024-12-12 DIAGNOSIS — M79.671 RIGHT FOOT PAIN: Primary | ICD-10-CM

## 2024-12-12 PROCEDURE — 99215 OFFICE O/P EST HI 40 MIN: CPT | Mod: PBBFAC,25

## 2024-12-12 PROCEDURE — 73630 X-RAY EXAM OF FOOT: CPT | Mod: TC,RT

## 2024-12-12 RX ORDER — DICLOFENAC SODIUM 10 MG/G
2 GEL TOPICAL DAILY
Qty: 450 G | Refills: 0 | Status: SHIPPED | OUTPATIENT
Start: 2024-12-12

## 2024-12-12 RX ORDER — NABUMETONE 500 MG/1
500 TABLET, FILM COATED ORAL 2 TIMES DAILY PRN
Qty: 20 TABLET | Refills: 0 | Status: SHIPPED | OUTPATIENT
Start: 2024-12-12 | End: 2024-12-22

## 2024-12-12 NOTE — PROGRESS NOTES
[facility]    Subjective:      Chief Complaint: Foot Pain (Right foot pain, onset 2 weeks, rates pain 6/10) and Edema (Right foot)      HPI   Nu Denny is a 79 y.o. female who presents to [facility] for R foot pain.    C/o  R foot pain located on central foot just proximal to 1st metatarsal head overlying medial cuneiform for the past 2 weeks. No limitations or pain during ROM of ankle. No increased swelling from baseline. Does have a small amount of edema overlying ATFL however patient states that this is chronic and is on both feet. No recent falls or trauma. No history of diabetes. Pain is characterized as aching rated 7-8/10. Having difficulties ambulating due to pain. No prior injuries to R foot.       Review of Systems  As per HPI.    Objective:     /68 (BP Location: Left arm, Patient Position: Sitting)   Pulse 77   Temp 97.6 °F (36.4 °C) (Oral)   Resp 20   Ht 5' (1.524 m)   Wt 83.5 kg (184 lb)   SpO2 99%   BMI 35.94 kg/m²     Physical Exam:  Gen: No acute distress, pleasant female  CV: RRR, no murmurs. Bilateral LE edema overlying ATFL.  Resp: CTAB, breathing non labored on room air.  Skin:  No signs of infection to right foot, no wounds.  MSK: ambulating without assistance, right-sided limp.  Pain on palpation of medial cuneiform and proximal metatarsal head.  No overlying soft tissue swelling.    Current Outpatient Medications   Medication Instructions    acetaminophen (TYLENOL ARTHRITIS ORAL) 2 tablets, 3 times daily    acetaminophen (TYLENOL) 650 mg, Every 8 hours    amoxicillin-clavulanate 875-125mg (AUGMENTIN) 875-125 mg per tablet 1 tablet, Oral, 2 times daily    APPLE CIDER VINEGAR ORAL Take by mouth.    aspirin (ECOTRIN) 81 mg, Daily    atorvastatin (LIPITOR) 20 mg, Oral, Daily    b complex vitamins capsule 1 capsule, Daily    calcium carbonate (OS-SREEDHAR) 500 mg calcium (1,250 mg) chewable tablet 1 tablet, Daily    diclofenac sodium (VOLTAREN ARTHRITIS PAIN) 2 g,  Topical (Top), Daily    dicyclomine (BENTYL) 10 mg, As needed (PRN)    docusate sodium (COLACE) 100 mg, Daily    famotidine (PEPCID) 20 mg, Oral, 2 times daily    loratadine (CLARITIN) 10 mg, Daily    losartan (COZAAR) 50 mg, Oral, Daily    metroNIDAZOLE (METROGEL) 0.75 % gel Topical (Top), 2 times daily, Apply to face PRN for rosacea related rash    multivitamin capsule 1 capsule, Daily    nabumetone (RELAFEN) 500 mg, Oral, 2 times daily PRN    ondansetron (ZOFRAN-ODT) 4 mg, Oral, Every 8 hours PRN    sertraline (ZOLOFT) 50 mg, Oral, Daily    vitamin D (VITAMIN D3) 1,000 Units, Daily       Assessment/Plan:     Right foot pain  - Reviewed x-rays that were done March 2023, shows osteoarthritis type changes  - X-Ray Foot Complete Right; Future  - Nabumetone 500 mg BID PRN x20 tablets  - Topical Voltaren PRN  - Will call with x-ray results once available    Follow-up:  Keep scheduled appointment on 02/04/2025 with PCP    Javier Coburn MD   [facility] - PGY-III

## 2024-12-13 ENCOUNTER — TELEPHONE (OUTPATIENT)
Dept: FAMILY MEDICINE | Facility: CLINIC | Age: 79
End: 2024-12-13
Payer: MEDICARE

## 2024-12-13 NOTE — TELEPHONE ENCOUNTER
Patient contacted and discussed results of R foot xray. Appears to have a few areas with tendonous calcification and degenerative changes seen on previously obtains xrays. No new fractures or dislocations. All questions answered. Advised to contact clinic for new or worsening symptoms.

## 2024-12-26 ENCOUNTER — TELEPHONE (OUTPATIENT)
Dept: FAMILY MEDICINE | Facility: CLINIC | Age: 79
End: 2024-12-26
Payer: MEDICARE

## 2024-12-26 DIAGNOSIS — K57.92 DIVERTICULITIS: ICD-10-CM

## 2024-12-26 RX ORDER — AMOXICILLIN AND CLAVULANATE POTASSIUM 875; 125 MG/1; MG/1
1 TABLET, FILM COATED ORAL 2 TIMES DAILY
Qty: 14 TABLET | Refills: 0 | Status: SHIPPED | OUTPATIENT
Start: 2024-12-26

## 2024-12-26 NOTE — TELEPHONE ENCOUNTER
Called patient. States she has had abdominal pain off and on since Monday night. Took dicyclomine and help, but returned. Stools loosened, but non bloody. Denies N/V, or fevers. Possibly a recurrence of diverticulitis. Will send 7 day supply of Augmentin and schedule appointment for 1/2/2025 with me

## 2025-01-15 ENCOUNTER — OFFICE VISIT (OUTPATIENT)
Dept: FAMILY MEDICINE | Facility: CLINIC | Age: 80
End: 2025-01-15
Payer: MEDICARE

## 2025-01-15 VITALS
DIASTOLIC BLOOD PRESSURE: 80 MMHG | BODY MASS INDEX: 36.36 KG/M2 | WEIGHT: 185.19 LBS | HEART RATE: 77 BPM | OXYGEN SATURATION: 99 % | TEMPERATURE: 98 F | RESPIRATION RATE: 18 BRPM | HEIGHT: 60 IN | SYSTOLIC BLOOD PRESSURE: 147 MMHG

## 2025-01-15 DIAGNOSIS — K57.92 DIVERTICULITIS: Primary | ICD-10-CM

## 2025-01-15 DIAGNOSIS — M85.859 OSTEOPENIA OF NECK OF FEMUR, UNSPECIFIED LATERALITY: ICD-10-CM

## 2025-01-15 DIAGNOSIS — K21.9 GASTROESOPHAGEAL REFLUX DISEASE, UNSPECIFIED WHETHER ESOPHAGITIS PRESENT: ICD-10-CM

## 2025-01-15 DIAGNOSIS — K59.09 OTHER CONSTIPATION: ICD-10-CM

## 2025-01-15 PROCEDURE — 96372 THER/PROPH/DIAG INJ SC/IM: CPT | Mod: PBBFAC

## 2025-01-15 PROCEDURE — 99215 OFFICE O/P EST HI 40 MIN: CPT | Mod: PBBFAC

## 2025-01-15 RX ORDER — POLYETHYLENE GLYCOL 3350 17 G/17G
17 POWDER, FOR SOLUTION ORAL DAILY
Qty: 595 G | Refills: 0 | Status: SHIPPED | OUTPATIENT
Start: 2025-01-15

## 2025-01-15 RX ORDER — LORATADINE 10 MG/1
10 TABLET ORAL DAILY
Qty: 30 TABLET | Refills: 4 | Status: SHIPPED | OUTPATIENT
Start: 2025-01-15

## 2025-01-15 RX ORDER — DICYCLOMINE HYDROCHLORIDE 10 MG/1
20 CAPSULE ORAL
Status: CANCELLED | OUTPATIENT
Start: 2025-01-15

## 2025-01-15 RX ORDER — SERTRALINE HYDROCHLORIDE 50 MG/1
50 TABLET, FILM COATED ORAL DAILY
Qty: 90 TABLET | Refills: 1 | Status: SHIPPED | OUTPATIENT
Start: 2025-01-15

## 2025-01-15 RX ORDER — DICYCLOMINE HYDROCHLORIDE 10 MG/1
20 CAPSULE ORAL 4 TIMES DAILY PRN
Qty: 60 CAPSULE | Refills: 0 | Status: SHIPPED | OUTPATIENT
Start: 2025-01-15

## 2025-01-15 RX ORDER — FAMOTIDINE 20 MG/1
20 TABLET, FILM COATED ORAL 2 TIMES DAILY
Qty: 180 TABLET | Refills: 0 | Status: SHIPPED | OUTPATIENT
Start: 2025-01-15

## 2025-01-15 RX ADMIN — DENOSUMAB 60 MG: 60 INJECTION SUBCUTANEOUS at 10:01

## 2025-01-15 NOTE — PROGRESS NOTES
Family Medicine Clinic Note     Subjective     Patient ID: Nu Denny is a 79 y.o. female.    Chief Complaint: Follow-up and diverticuliltis (Patient states diverticulitis is doing better.)    HPI  Abdominal pain: Started on 12/25 with abdominal pain crampy but rated 10/10. Has been adhering to low inflammatory diet, but states she had been constipated prior. Hx of diverticulitis. Contacted office on 12/26 and 7 day course of Augmentin was prescribed with f/u with me on 1/2/25. Pt unable to make 1/2 appointment with me. Reports resolutions of symptoms. At that time stools were loose and dark brown with foul odor.    Constipation:  chronic throughout life. Has started taking 8 oz of prune juice daily and having daily bowel movements. Formed, but soft then has episodes of diarrhea. Tried benefiber and colace previously but stopped working.    Osteopenia: Dexa scan stable compared to previous in 2021. Prolia injections started 6/2022. Last dose 8/2024. Taking vitamin D and calcium supplements otc. Denies recent falls    PMHx:  Hypertension: complaint with medication, does not monitor BP at home  Central retinal vein occlusion of right eye  GERD/Zenker's Diverticulum: well controlled, some dysphagia and dry mouth but stable in size on CT neck 1/2024  Pulmonary nodules: ct scan 6/18 with stability over 2 year span suggesting benign etiology    Care Team:  Dr. Quick Optmanuela  [facility]  [facility]    Current Outpatient Medications   Medication Instructions    acetaminophen (TYLENOL ARTHRITIS ORAL) 2 tablets, 3 times daily    amoxicillin-clavulanate 875-125mg (AUGMENTIN) 875-125 mg per tablet 1 tablet, Oral, 2 times daily    APPLE CIDER VINEGAR ORAL Take by mouth.    aspirin (ECOTRIN) 81 mg, Daily    atorvastatin (LIPITOR) 20 mg, Oral, Daily    calcium carbonate (OS-SREEDHAR) 500 mg calcium (1,250 mg) chewable tablet 1 tablet, Daily    diclofenac sodium (VOLTAREN ARTHRITIS PAIN) 2 g, Topical  (Top), Daily    dicyclomine (BENTYL) 20 mg, Oral, 4 times daily PRN    famotidine (PEPCID) 20 mg, Oral, 2 times daily    loratadine (CLARITIN) 10 mg, Oral, Daily    losartan (COZAAR) 50 mg, Oral, Daily    metroNIDAZOLE (METROGEL) 0.75 % gel Topical (Top), 2 times daily, Apply to face PRN for rosacea related rash    multivitamin capsule 1 capsule, Daily    ondansetron (ZOFRAN-ODT) 4 mg, Oral, Every 8 hours PRN    polyethylene glycol (GLYCOLAX) 17 g, Oral, Daily    sertraline (ZOLOFT) 50 mg, Oral, Daily    vitamin D (VITAMIN D3) 1,000 Units, Daily       Review of Systems   Constitutional:  Negative for fever.   Gastrointestinal:  Positive for constipation. Negative for abdominal pain, blood in stool, melena, nausea and vomiting.   Genitourinary:  Negative for dysuria.        Objective     Vitals:    01/15/25 0920 01/15/25 0923 01/15/25 1018   BP: (!) 153/71 (!) 175/82 (!) 147/80   Pulse: 77     Resp: 18     Temp: 97.7 °F (36.5 °C)     TempSrc: Oral     SpO2: 99%     Weight: 84 kg (185 lb 3.2 oz)     Height: 5' (1.524 m)          Physical Exam  Vitals reviewed.   Constitutional:       General: She is not in acute distress.  HENT:      Head: Normocephalic and atraumatic.      Mouth/Throat:      Mouth: Mucous membranes are moist.   Eyes:      Extraocular Movements: Extraocular movements intact.      Conjunctiva/sclera: Conjunctivae normal.   Cardiovascular:      Rate and Rhythm: Normal rate and regular rhythm.      Pulses: Normal pulses.      Heart sounds: Normal heart sounds. No murmur heard.  Pulmonary:      Effort: Pulmonary effort is normal. No respiratory distress.      Breath sounds: Normal breath sounds. No wheezing, rhonchi or rales.   Abdominal:      General: Bowel sounds are normal. There is no distension.      Palpations: Abdomen is soft.      Tenderness: There is no abdominal tenderness.   Musculoskeletal:         General: No tenderness.      Right lower leg: No edema.      Left lower leg: No edema.   Skin:      General: Skin is warm and dry.      Capillary Refill: Capillary refill takes less than 2 seconds.   Neurological:      Mental Status: She is alert and oriented to person, place, and time. Mental status is at baseline.          Assessment and Plan    1. Diverticulitis (Primary)  Resolved  Discussed avoiding food triggers, preventing constipation and preventing GI infections  Bentyl refilled   Discussed if event occurs to allow bowel rest with clear liquid diet only, notify office or present to ED for antibiotic management  Discussed signs and symptoms of a diverticulitis flare    2. Other constipation  Goal of one soft but formed bowel movement per day  Start miralax and metamucil     3. Osteopenia of neck of femur, unspecified laterality  Stable  Prolia injection today as can be administered up to 4 weeks early and patient is had appointment scheduled for 2/4/25. Last dose 8/2/24  CMP and vitamin d at next visit    4. Gastroesophageal reflux disease, unspecified whether esophagitis present  Stable  Meds refilled  - famotidine (PEPCID) 20 MG tablet; Take 1 tablet (20 mg total) by mouth 2 (two) times daily.  Dispense: 180 tablet; Refill: 0  - denosumab (PROLIA) injection 60 mg        Follow up in about 6 months (around 7/15/2025) for prolia/osteopenia.    Carey Gonzalez DO  [facility] Family Medicine Resident, [facility]

## 2025-02-09 NOTE — DISCHARGE SUMMARY
[facility] - Cath Lab  Discharge Note  Short Stay      Date of procedure:  06/28/2023  Outcome of procedure: The patient tolerated the procedure well.  Complications:  There were no immediate complications.   Final diagnosis:  Nonobstructive CAD  Change in medications:  None.  Discharge instructions:   Avoid straining with affected limb for one week.  Disposition:  home  Follow up:  in Cardiology Clinic      Spencer Rae MD, MSCI  [facility] Cardiology Fellow  06/28/2023   8:09 AM  Replaced by [facility]    
[id number]

## 2025-03-06 ENCOUNTER — OFFICE VISIT (OUTPATIENT)
Dept: OTOLARYNGOLOGY | Facility: CLINIC | Age: 80
End: 2025-03-06
Payer: MEDICARE

## 2025-03-06 ENCOUNTER — CLINICAL SUPPORT (OUTPATIENT)
Dept: AUDIOLOGY | Facility: HOSPITAL | Age: 80
End: 2025-03-06
Payer: MEDICARE

## 2025-03-06 VITALS — HEART RATE: 76 BPM | SYSTOLIC BLOOD PRESSURE: 138 MMHG | TEMPERATURE: 98 F | DIASTOLIC BLOOD PRESSURE: 86 MMHG

## 2025-03-06 DIAGNOSIS — H90.3 SENSORINEURAL HEARING LOSS, BILATERAL: Primary | ICD-10-CM

## 2025-03-06 DIAGNOSIS — H90.5 SENSORINEURAL HEARING LOSS (SNHL), UNSPECIFIED LATERALITY: ICD-10-CM

## 2025-03-06 DIAGNOSIS — H61.899 DRY EAR CANAL, UNSPECIFIED LATERALITY: ICD-10-CM

## 2025-03-06 DIAGNOSIS — H90.5 SENSORINEURAL HEARING LOSS (SNHL), UNSPECIFIED LATERALITY: Primary | ICD-10-CM

## 2025-03-06 PROCEDURE — 92557 COMPREHENSIVE HEARING TEST: CPT | Performed by: AUDIOLOGIST

## 2025-03-06 PROCEDURE — 92567 TYMPANOMETRY: CPT | Performed by: AUDIOLOGIST

## 2025-03-06 PROCEDURE — 99214 OFFICE O/P EST MOD 30 MIN: CPT | Mod: PBBFAC,25 | Performed by: NURSE PRACTITIONER

## 2025-03-06 PROCEDURE — 99213 OFFICE O/P EST LOW 20 MIN: CPT | Mod: S$PBB,,, | Performed by: NURSE PRACTITIONER

## 2025-03-06 RX ORDER — FLUOCINOLONE ACETONIDE 0.11 MG/ML
4 OIL AURICULAR (OTIC) 2 TIMES DAILY
Qty: 20 ML | Refills: 1 | Status: SHIPPED | OUTPATIENT
Start: 2025-03-06

## 2025-03-06 RX ORDER — POLYETHYLENE GLYCOL 3350 17 G/17G
17 POWDER, FOR SOLUTION ORAL DAILY
Qty: 595 G | Refills: 0 | Status: SHIPPED | OUTPATIENT
Start: 2025-03-06

## 2025-03-06 NOTE — PROGRESS NOTES
Hearing Evaluation        Patient History: Mrs. Denny has a long-standing hearing loss reporting no changes in hearing since previous evaluation. She denies tinnitus, vertigo and middle ear issues are denied. She does complain of intermittent left aural fullness.  All additional history is unremarkable.        Test Results:                    Pure Tone Testing:      Right ear:       Moderate sensorineural hearing loss from 250-8kHz. Speech reception threshold is in agreement with puretone findings.  Discrimination score of 100% is considered excellent.        Left ear:          Moderate to moderately severe sensorineural hearing loss from 250-8kHz. Speech reception threshold is in agreement with puretone findings.  Discrimination score of 100% is considered excellent.                                                                            Tympanometry:                                           Right ear:       Type 'A' tymp, normal middle ear pressure/function     Left ear:          Type 'A' tymp, normal middle ear pressure/function                                     Interpretations:      Behavioral test findings indicate no significant changes in hearing since previous hearing evaluation. Speech reception thresholds obtained at 50dB, AU, and are in agreement with puretone findings. Speech discrimination scores of 100%, AU, are considered excellent.  Immittance measures indicate normal middle ear pressure/function, bilaterally.            Recommendations:   1. Annual hearing evaluations  2. Binaural amplification discussed; pt not ready at this time

## 2025-03-06 NOTE — PROGRESS NOTES
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 She says that the dizziness rather suddenly after she had a trip to the emergency room in August for fever and was found to have UTI and bacteremia. She describes the dizziness as a feeling of lightheadedness where she feels she may pass out; she does not experience other symptoms such as sweating or heart palpitations during these episodes. She has not actually passed out however. These symptoms occur for a few seconds and are always provoked by positional changes such as leaning over to tie her shoes, rolling over in bed, or sitting up from bed. She uses a pincer to reach for things on the ground and takes her time to sit up to avoid her dizziness symptoms    12/21/20: No c/o. Reports she is no longer having any dizzy episodes. Feels they were r/t her eye injections which she d/c. Vestibular battery WNL. Audiogram stable.    11/16/2022: C/o otalgia R>L x 1.5mo. She denies any change in hearing, tinnitus, dizziness, or change in otologic hx. She has recently been treated for neck pain and disc issues and is still having symptoms. Denies nasal congestion or rhinitis.     2/7/23: Doing well. States she is no longer having otalgia since neck pain has improved.     3/6/24: Denies otologic c/o. Had a recent fall and hit her head. She was evaluated in the ED and is improving.    03/06/2025: Reports she has been having a strange sensation in her left ear, as if there is something inside of it. Denies otalgia. States she has tried mineral oil without improvement. Not interested in amplification at this time, stating she wants to address eye concerns first. She is blind in her right eye and may need surgery on her left eye.     ROS:   10-point review of  systems negative except per HPI      Review of patient's allergies indicates:   Allergen Reactions    Codeine Rash       Past Medical History:   Diagnosis Date    Anxiety 06/13/2022    Cataract of left eye 06/13/2022    Central retinal vein occlusion of right eye 06/13/2022    Female bladder prolapse 06/13/2022    Gastroesophageal reflux disease 06/13/2022    Hyperlipidemia 06/13/2022    Hypertension 06/13/2022    Mild CAD     Osteopenia of neck of femur 06/13/2022    Urinary incontinence 06/13/2022    Zenker diverticulum        Past Surgical History:   Procedure Laterality Date    CHOLECYSTECTOMY      COLONOSCOPY  2017    HYSTERECTOMY      KNEE SURGERY Right     x2; states MD wanted to do replacement but pt declined    SHOULDER SURGERY  1995    TONSILLECTOMY      VITRECTOMY         Social History     Socioeconomic History    Marital status:    Tobacco Use    Smoking status: Never    Smokeless tobacco: Never   Substance and Sexual Activity    Alcohol use: Never    Drug use: Never       Family History   Problem Relation Age of Onset    Hypertension Mother     No Known Problems Father     No Known Problems Sister     No Known Problems Brother     No Known Problems Maternal Aunt     No Known Problems Maternal Uncle     No Known Problems Paternal Aunt     No Known Problems Paternal Uncle     Diabetes Maternal Grandmother     No Known Problems Maternal Grandfather     No Known Problems Paternal Grandmother     No Known Problems Paternal Grandfather     Amblyopia Neg Hx     Blindness Neg Hx     Cancer Neg Hx     Cataracts Neg Hx     Glaucoma Neg Hx     Macular degeneration Neg Hx     Retinal detachment Neg Hx     Strabismus Neg Hx     Stroke Neg Hx     Thyroid disease Neg Hx        Outpatient Encounter Medications as of 11/16/2022   Medication Sig Dispense Refill    aspirin (ECOTRIN) 81 MG EC tablet Take 81 mg by mouth once daily.      atorvastatin (LIPITOR) 20 MG tablet Take 1 tablet (20 mg total) by mouth once  "daily. 90 tablet 0    b complex vitamins capsule Take 1 capsule by mouth once daily.      calcium carbonate (OS-SREEDHAR) 500 mg calcium (1,250 mg) chewable tablet Take 1 tablet by mouth once daily.      docusate sodium (COLACE) 100 MG capsule Take 100 mg by mouth once daily at 6am.      famotidine (PEPCID) 20 MG tablet Take 1 tablet (20 mg total) by mouth 2 (two) times daily. 180 tablet 0    losartan (COZAAR) 50 MG tablet Take 1 tablet (50 mg total) by mouth once daily. 90 tablet 0    methylcellulose oral powder Take 3.4 g by mouth once daily.      metroNIDAZOLE (METROGEL) 0.75 % gel Apply topically 2 (two) times daily. Apply to face PRN for rosacea related rash 45 g 1    multivitamin capsule Take 1 capsule by mouth once daily.      sertraline (ZOLOFT) 25 MG tablet Take 2 tablets (50 mg total) by mouth once daily. 60 tablet 5    sertraline (ZOLOFT) 50 MG tablet Take 50 mg by mouth once daily.      vitamin D (VITAMIN D3) 1000 units Tab Take 1,000 Units by mouth once daily.       No facility-administered encounter medications on file as of 11/16/2022.       Physical Exam:  Vitals:    11/16/22 1126   BP: (!) 144/86   BP Location: Right arm   Patient Position: Sitting   BP Method: Large (Manual)   Temp: 98.2 °F (36.8 °C)   TempSrc: Oral   Weight: 88.5 kg (195 lb)   Height: 5' 2.01" (1.575 m)       General: NAD, voice normal  Neuro: AAO, CN II - XII grossly intact  Head/ Face: NCAT, symmetric, sensations intact bilaterally  Eyes: EOMI, PERRL  Ears: externally normal with grossly normal hearing  AD: EAC patent, TM intact, no middle ear effusion, no retractions  AS: EAC patent, TM intact, no middle ear effusion, no retractions  Nose: bilateral nares patent, midline septum, no rhinorrhea, no external deformity, no turbinate hypertrophy  OC/OP: MMM, no intraoral lesions, no trismus, dentition is poor (edentulous upper), no uvular deviation, bilaterally symmetric soft palate elevation, palatoglossus and palatopharyngeal fold wnl; " tonsils are symmetric/absent. Marked pterygoid TTP R>L  Indirect laryngoscopy: deferred due to patient intolerance  Neck: tight musculature with reported tenderness to lateral/posterior palpation & movement, supple, no LAD, no thyromegaly  Respiratory: nonlabored, no wheezing, bilateral chest rise  Cardiovascular: RRR  Gastrointestinal: S NT ND  Skin: warm, no lesions  Musculoskeletal: 5/5 strength  Psych: Appropriate affect/mood     Pertinent Data:  ? LABS:  ? AUDIO:               ? PATH:      Imaging:   I personally reviewed the following images:        Assessment/Plan:  77 y.o. female with HL and dry ear canals. Hx of b/l SNHL which is stable- reviewed.   - Annual audio  - Dermotic to left ear BID x 10 days and prn for itching  - RTC 1 year following audio    Nicole Upton NP

## 2025-04-24 ENCOUNTER — OFFICE VISIT (OUTPATIENT)
Dept: GASTROENTEROLOGY | Facility: CLINIC | Age: 80
End: 2025-04-24
Payer: MEDICARE

## 2025-04-24 VITALS
BODY MASS INDEX: 35.66 KG/M2 | TEMPERATURE: 98 F | SYSTOLIC BLOOD PRESSURE: 140 MMHG | WEIGHT: 181.63 LBS | HEART RATE: 108 BPM | OXYGEN SATURATION: 99 % | HEIGHT: 60 IN | RESPIRATION RATE: 16 BRPM | DIASTOLIC BLOOD PRESSURE: 85 MMHG

## 2025-04-24 DIAGNOSIS — K59.09 CHRONIC CONSTIPATION: ICD-10-CM

## 2025-04-24 DIAGNOSIS — K57.92 DIVERTICULITIS: Primary | ICD-10-CM

## 2025-04-24 DIAGNOSIS — Z86.0100 HISTORY OF COLONIC POLYPS: ICD-10-CM

## 2025-04-24 PROCEDURE — 99204 OFFICE O/P NEW MOD 45 MIN: CPT | Mod: S$PBB,,, | Performed by: NURSE PRACTITIONER

## 2025-04-24 PROCEDURE — 99215 OFFICE O/P EST HI 40 MIN: CPT | Mod: PBBFAC | Performed by: NURSE PRACTITIONER

## 2025-04-24 NOTE — PROGRESS NOTES
Subjective:       Patient ID: Nu Denny is a 80 y.o. female.    Chief Complaint: Diverticulitis (Around Dec had ABD pain and spasms was prescribed Bentyl Prn but has needed since then)    This 80-year-old  female with anxiety, cataracts, hyperlipidemia, hypertension, CAD, osteopenia, and colon polyps is referred for diverticulitis.  She presents unaccompanied.  She was evaluated in the ED September 5, 2024 for further evaluation of LLQ abdominal pain with nausea and diarrhea.  Upon arrival to ED, vital signs were stable.  Physical exam revealed generalized abdominal tenderness without rebound or guarding.  Laboratory results revealed unremarkable CBC, CMP, magnesium.  Urinalysis with 1+ protein, 75 leukocyte esterase, occasional bacteria, few squamous epithelial cells, few mucus, 3-5 hyaline casts.  CT scan abdomen and pelvis with IV contrast revealed diverticulitis distal descending and sigmoid colon without gross perforation or drainable abscess.  She was prescribed Augmentin 875 mg twice daily for 7 days, Bentyl 20 mg as needed for abdominal pain, and Zofran 4 mg as needed for nausea and vomiting and recommended outpatient follow-up with GI clinic.    She reports improvement of symptoms after ED visit and completing Augmentin.  In December 2024 after [place], she began to experience sharp left lower quadrant abdominal pain again that did not radiate.  She had associated nausea without vomiting.  She was unable to identify specific triggers or relieving factors.  She is unsure if she was constipated around this time.  She called her PCP and was prescribed Augmentin again with symptomatic improvement noted after completing antibiotics.    Today, she presents for a follow-up visit.  She reports dietary changes and taking MiraLax 17 g once daily with good relief noted.  She typically has 1-2 soft to loose stools daily and feels completely evacuated with taking MiraLax.  She denies melena,  hematochezia, fecal urgency, fecal incontinence, or pain with defecation.  Her appetite is good and her weight is stable.  She denies fever, chills, nausea, vomiting, hematemesis, odynophagia, dysphagia, acid reflux, pyrosis, early satiety, belching, bloating, or abdominal pain.     She underwent colonoscopy with Dr. Crum March 23, 2017 with findings of adenomatous polyp at 30 cm, scattered diverticulosis in the left colon and distal transverse colon, poor prep and cecum not well visualized.    She underwent colonoscopy with Dr. Castellanos October 3, 2023 with findings of internal hemorrhoids, diverticulosis of the left side of the colon, and otherwise normal mucosa in the colon.    She denies ever having an EGD done.  She takes aspirin 81 mg daily.  She denies tobacco or alcohol use.  She denies illicit drug use.  She denies a family history of IBD, colon polyps, or colon cancer.      Review of patient's allergies indicates:   Allergen Reactions    Codeine Rash     Past Medical History:   Diagnosis Date    Anxiety 06/13/2022    Cataract of left eye 06/13/2022    Central retinal vein occlusion of right eye 06/13/2022    Female bladder prolapse 06/13/2022    Gastroesophageal reflux disease 06/13/2022    Hyperlipidemia 06/13/2022    Hypertension 06/13/2022    Mild CAD     Osteopenia of neck of femur 06/13/2022    Urinary incontinence 06/13/2022    Zenker diverticulum      Past Surgical History:   Procedure Laterality Date    ANGIOGRAM, CORONARY, WITH LEFT HEART CATHETERIZATION N/A 6/28/2023    Procedure: Angiogram, Coronary, with Left Heart Cath;  Surgeon: Kaveh Morales MD;  Location: [facility] CATH LAB;  Service: Cardiology;  Laterality: N/A;    CHOLECYSTECTOMY      COLONOSCOPY  2017    HYSTERECTOMY      KNEE SURGERY Right     x2; states MD wanted to do replacement but pt declined    SHOULDER SURGERY  1995    TONSILLECTOMY      VITRECTOMY       Family History:   family history includes Diabetes in her maternal  grandmother; Hypertension in her mother; No Known Problems in her brother, father, maternal aunt, maternal grandfather, maternal uncle, paternal aunt, paternal grandfather, paternal grandmother, paternal uncle, and sister.    Social History:    reports that she has never smoked. She has been exposed to tobacco smoke. She has never used smokeless tobacco. She reports that she does not drink alcohol and does not use drugs.    Review of Systems  Negative except as noted in the HPI.      Objective:      Physical Exam  Constitutional:       Appearance: Normal appearance.   HENT:      Head: Normocephalic.      Mouth/Throat:      Mouth: Mucous membranes are moist.   Eyes:      Extraocular Movements: Extraocular movements intact.      Conjunctiva/sclera: Conjunctivae normal.      Pupils: Pupils are equal, round, and reactive to light.   Cardiovascular:      Rate and Rhythm: Normal rate and regular rhythm.      Pulses: Normal pulses.      Heart sounds: Normal heart sounds.   Pulmonary:      Effort: Pulmonary effort is normal.      Breath sounds: Normal breath sounds.   Abdominal:      General: Bowel sounds are normal.      Palpations: Abdomen is soft.   Musculoskeletal:         General: Normal range of motion.      Cervical back: Normal range of motion and neck supple.   Skin:     General: Skin is warm and dry.   Neurological:      General: No focal deficit present.      Mental Status: She is alert and oriented to person, place, and time.   Psychiatric:         Mood and Affect: Mood normal.         Behavior: Behavior normal.         Thought Content: Thought content normal.         Judgment: Judgment normal.         Home Medications:     Current Outpatient Medications   Medication Sig    acetaminophen (TYLENOL ARTHRITIS ORAL) Take 2 tablets by mouth 3 (three) times daily.    APPLE CIDER VINEGAR ORAL Take by mouth.    aspirin (ECOTRIN) 81 MG EC tablet Take 81 mg by mouth once daily.    atorvastatin (LIPITOR) 20 MG tablet Take  1 tablet (20 mg total) by mouth once daily.    calcium carbonate (OS-SREEDHAR) 500 mg calcium (1,250 mg) chewable tablet Take 1 tablet by mouth once daily.    dicyclomine (BENTYL) 10 MG capsule Take 2 capsules (20 mg total) by mouth 4 (four) times daily as needed.    famotidine (PEPCID) 20 MG tablet Take 1 tablet (20 mg total) by mouth 2 (two) times daily.    loratadine (CLARITIN) 10 mg tablet Take 1 tablet (10 mg total) by mouth once daily.    losartan (COZAAR) 50 MG tablet Take 1 tablet (50 mg total) by mouth once daily.    multivitamin capsule Take 1 capsule by mouth once daily.    ondansetron (ZOFRAN-ODT) 4 MG TbDL Take 1 tablet (4 mg total) by mouth every 8 (eight) hours as needed (nausea and vomiting).    polyethylene glycol (GLYCOLAX) 17 gram/dose powder Take 17 g by mouth once daily.    sertraline (ZOLOFT) 50 MG tablet Take 1 tablet (50 mg total) by mouth once daily.    vitamin D (VITAMIN D3) 1000 units Tab Take 1,000 Units by mouth once daily.    amoxicillin-clavulanate 875-125mg (AUGMENTIN) 875-125 mg per tablet Take 1 tablet by mouth 2 (two) times daily. (Patient not taking: Reported on 4/24/2025)    diclofenac sodium (VOLTAREN ARTHRITIS PAIN) 1 % Gel Apply 2 g topically once daily. (Patient not taking: Reported on 4/24/2025)    fluocinolone acetonide oiL 0.01 % Drop Place 4 drops in ear(s) 2 (two) times a day. In affected ear for itching X 10 days    metroNIDAZOLE (METROGEL) 0.75 % gel Apply topically 2 (two) times daily. Apply to face PRN for rosacea related rash     No current facility-administered medications for this visit.     Laboratory Results:     Recent Results (from the past 40 weeks)   CBC with Differential    Collection Time: 08/02/24 11:30 AM   Result Value Ref Range    WBC 6.46 4.50 - 11.50 x10(3)/mcL    RBC 4.18 (L) 4.20 - 5.40 x10(6)/mcL    Hgb 12.7 12.0 - 16.0 g/dL    Hct 39.1 37.0 - 47.0 %    MCV 93.5 80.0 - 94.0 fL    MCH 30.4 27.0 - 31.0 pg    MCHC 32.5 (L) 33.0 - 36.0 g/dL    RDW 13.3  11.5 - 17.0 %    Platelet 253 130 - 400 x10(3)/mcL    MPV 10.3 7.4 - 10.4 fL    Neut % 51.8 %    Lymph % 36.7 %    Mono % 7.6 %    Eos % 3.1 %    Basophil % 0.5 %    Lymph # 2.37 0.6 - 4.6 x10(3)/mcL    Neut # 3.35 2.1 - 9.2 x10(3)/mcL    Mono # 0.49 0.1 - 1.3 x10(3)/mcL    Eos # 0.20 0 - 0.9 x10(3)/mcL    Baso # 0.03 <=0.2 x10(3)/mcL    Imm Gran # 0.02 0 - 0.04 x10(3)/mcL    Imm Grans % 0.3 %    NRBC% 0.0 %   Vitamin D    Collection Time: 08/02/24 11:30 AM   Result Value Ref Range    Vitamin D 48 30 - 80 ng/mL   Hemoglobin A1C    Collection Time: 08/02/24 11:30 AM   Result Value Ref Range    Hemoglobin A1c 5.6 <=7.0 %    Estimated Average Glucose 114.0 mg/dL   Comprehensive Metabolic Panel    Collection Time: 08/02/24 11:30 AM   Result Value Ref Range    Sodium 144 136 - 145 mmol/L    Potassium 3.8 3.5 - 5.1 mmol/L    Chloride 111 (H) 98 - 107 mmol/L    CO2 24 23 - 31 mmol/L    Glucose 99 82 - 115 mg/dL    Blood Urea Nitrogen 23.3 (H) 9.8 - 20.1 mg/dL    Creatinine 0.75 0.55 - 1.02 mg/dL    Calcium 10.0 8.4 - 10.2 mg/dL    Protein Total 7.2 5.8 - 7.6 gm/dL    Albumin 4.0 3.4 - 4.8 g/dL    Globulin 3.2 2.4 - 3.5 gm/dL    Albumin/Globulin Ratio 1.3 1.1 - 2.0 ratio    Bilirubin Total 1.1 <=1.5 mg/dL    ALP 80 40 - 150 unit/L    ALT 23 0 - 55 unit/L    AST 25 5 - 34 unit/L    eGFR >60 mL/min/1.73/m2    Anion Gap 9.0 mEq/L    BUN/Creatinine Ratio 31    Lipid Panel    Collection Time: 08/02/24 11:30 AM   Result Value Ref Range    Cholesterol Total 164 <=200 mg/dL    HDL Cholesterol 54 35 - 60 mg/dL    Triglyceride 125 37 - 140 mg/dL    Cholesterol/HDL Ratio 3 0 - 5    Very Low Density Lipoprotein 25     LDL Cholesterol 85.00 50.00 - 140.00 mg/dL   Urinalysis, Reflex to Urine Culture    Collection Time: 09/05/24  7:55 AM    Specimen: Urine   Result Value Ref Range    Color, UA Yellow Yellow, Light-Yellow, Dark Yellow, Sara, Straw    Appearance, UA Cloudy (A) Clear    Specific Gravity, UA 1.022 1.005 - 1.030    pH, UA 5.5  5.0 - 8.5    Protein, UA 1+ (A) Negative    Glucose, UA Normal Negative, Normal    Ketones, UA Negative Negative    Blood, UA Negative Negative    Bilirubin, UA Negative Negative    Urobilinogen, UA Normal 0.2, 1.0, Normal    Nitrites, UA Negative Negative    Leukocyte Esterase, UA 75 (A) Negative    RBC, UA 0-5 None Seen, 0-2, 3-5, 0-5 /HPF    WBC, UA 0-5 None Seen, 0-2, 3-5, 0-5 /HPF    Bacteria, UA Occ (A) None Seen /HPF    Squamous Epithelial Cells, UA Few (A) None Seen /HPF    Mucous, UA Few (A) None Seen /LPF    Hyaline Casts, UA 3-5 (A) None Seen /lpf   Comprehensive metabolic panel    Collection Time: 09/05/24  8:18 AM   Result Value Ref Range    Sodium 141 136 - 145 mmol/L    Potassium 4.2 3.5 - 5.1 mmol/L    Chloride 111 (H) 98 - 107 mmol/L    CO2 20 (L) 23 - 31 mmol/L    Glucose 101 82 - 115 mg/dL    Blood Urea Nitrogen 13.2 9.8 - 20.1 mg/dL    Creatinine 0.78 0.55 - 1.02 mg/dL    Calcium 9.3 8.4 - 10.2 mg/dL    Protein Total 6.9 5.8 - 7.6 gm/dL    Albumin 3.6 3.4 - 4.8 g/dL    Globulin 3.3 2.4 - 3.5 gm/dL    Albumin/Globulin Ratio 1.1 1.1 - 2.0 ratio    Bilirubin Total 1.2 <=1.5 mg/dL     40 - 150 unit/L    ALT 20 0 - 55 unit/L    AST 23 5 - 34 unit/L    eGFR >60 mL/min/1.73/m2    Anion Gap 10.0 mEq/L    BUN/Creatinine Ratio 17    Magnesium    Collection Time: 09/05/24  8:18 AM   Result Value Ref Range    Magnesium Level 2.30 1.60 - 2.60 mg/dL   CBC with Differential    Collection Time: 09/05/24  8:18 AM   Result Value Ref Range    WBC 11.18 4.50 - 11.50 x10(3)/mcL    RBC 4.09 (L) 4.20 - 5.40 x10(6)/mcL    Hgb 12.5 12.0 - 16.0 g/dL    Hct 38.2 37.0 - 47.0 %    MCV 93.4 80.0 - 94.0 fL    MCH 30.6 27.0 - 31.0 pg    MCHC 32.7 (L) 33.0 - 36.0 g/dL    RDW 13.2 11.5 - 17.0 %    Platelet 267 130 - 400 x10(3)/mcL    MPV 10.0 7.4 - 10.4 fL    Neut % 65.6 %    Lymph % 23.3 %    Mono % 9.4 %    Eos % 1.0 %    Basophil % 0.4 %    Lymph # 2.61 0.6 - 4.6 x10(3)/mcL    Neut # 7.34 2.1 - 9.2 x10(3)/mcL    Mono #  1.05 0.1 - 1.3 x10(3)/mcL    Eos # 0.11 0 - 0.9 x10(3)/mcL    Baso # 0.04 <=0.2 x10(3)/mcL    Imm Gran # 0.03 0 - 0.04 x10(3)/mcL    Imm Grans % 0.3 %    NRBC% 0.0 %   IN OFFICE EKG 12-LEAD (to [place])    Collection Time: 10/25/24  9:55 AM   Result Value Ref Range    QRS Duration 86 ms    [facility] QTC Calculation 414 ms     Imaging Results:     Narrative & Impression  EXAMINATION:  CT ABDOMEN PELVIS WITH IV CONTRAST     CLINICAL HISTORY:  LLQ abdominal pain;     TECHNIQUE:  Helical acquisition through the abdomen and pelvis with IV contrast.  Three plane reconstructions were provided for review. DLP 1069 mGycm. Automatic exposure control, adjustment of mA/kV or iterative reconstruction technique was used to reduce radiation.     COMPARISON:  No priors     FINDINGS:  The limited imaged lung bases are clear.     No significant abnormality of the liver, spleen, pancreas or adrenals.  Gallbladder surgically absent.  No hydronephrosis or suspicious renal lesion.     No bowel obstruction.  Normal appendix.  There is colonic diverticulosis with inflammatory changes along the distal descending and sigmoid colon.  No free air.     Urinary bladder is unremarkable. No free fluid. Aorta normal in caliber.  Mild atherosclerotic disease.     Moderate degenerative change of the spine.     Impression:     Diverticulitis distal descending and sigmoid colon without gross perforation or drainable abscess.      Electronically signed by:Lopez Wolf  Date:                                            09/05/2024  Time:                                           10:25    Assessment/Plan:     Problem List Items Addressed This Visit          GI    Diverticulitis - Primary    ED visit September 5, 2024 for further evaluation of LLQ abdominal pain with nausea and diarrhea.    Physical exam revealed generalized abdominal tenderness without rebound or guarding.    Laboratory results revealed unremarkable CBC, CMP, magnesium.  Urinalysis with 1+ protein,  75 leukocyte esterase, occasional bacteria, few squamous epithelial cells, few mucus, 3-5 hyaline casts.    CT scan abdomen and pelvis with IV contrast revealed diverticulitis distal descending and sigmoid colon without gross perforation or drainable abscess.    She was prescribed Augmentin 875 mg twice daily for 7 days, Bentyl 20 mg as needed for abdominal pain, and Zofran 4 mg as needed for nausea and vomiting and recommended outpatient follow-up with GI clinic.  She underwent colonoscopy with Dr. Crum March 23, 2017 with findings of adenomatous polyp at 30 cm, scattered diverticulosis in the left colon and distal transverse colon, poor prep and cecum not well visualized.  She underwent colonoscopy with Dr. Castellanos October 3, 2023 with findings of internal hemorrhoids, diverticulosis of the left side of the colon, and otherwise normal mucosa in the colon.  Recommend soluble fiber supplementation  Avoid straining or sitting on the toilet for long periods of time  Continue MiraLax 17 g daily as directed  Colonoscopy   Recommend daily probiotic   Call with updates   ER precautions provided   Follow-up clinic visit with NP in 6 months (after date of scheduled procedure)         Chronic constipation    See above         History of colonic polyps    She underwent colonoscopy with Dr. Crum March 23, 2017 with findings of adenomatous polyp at 30 cm, scattered diverticulosis in the left colon and distal transverse colon, poor prep and cecum not well visualized.  She underwent colonoscopy with Dr. Castellanos October 3, 2023 with findings of internal hemorrhoids, diverticulosis of the left side of the colon, and otherwise normal mucosa in the colon.

## 2025-04-24 NOTE — ASSESSMENT & PLAN NOTE
ED visit September 5, 2024 for further evaluation of LLQ abdominal pain with nausea and diarrhea.    Physical exam revealed generalized abdominal tenderness without rebound or guarding.    Laboratory results revealed unremarkable CBC, CMP, magnesium.  Urinalysis with 1+ protein, 75 leukocyte esterase, occasional bacteria, few squamous epithelial cells, few mucus, 3-5 hyaline casts.    CT scan abdomen and pelvis with IV contrast revealed diverticulitis distal descending and sigmoid colon without gross perforation or drainable abscess.    She was prescribed Augmentin 875 mg twice daily for 7 days, Bentyl 20 mg as needed for abdominal pain, and Zofran 4 mg as needed for nausea and vomiting and recommended outpatient follow-up with GI clinic.  She underwent colonoscopy with Dr. Crum March 23, 2017 with findings of adenomatous polyp at 30 cm, scattered diverticulosis in the left colon and distal transverse colon, poor prep and cecum not well visualized.  She underwent colonoscopy with Dr. Castellanos October 3, 2023 with findings of internal hemorrhoids, diverticulosis of the left side of the colon, and otherwise normal mucosa in the colon.  Recommend soluble fiber supplementation  Avoid straining or sitting on the toilet for long periods of time  Continue MiraLax 17 g daily as directed  Colonoscopy   Recommend daily probiotic   Call with updates   ER precautions provided   Follow-up clinic visit with NP in 6 months (after date of scheduled procedure)

## 2025-04-24 NOTE — ASSESSMENT & PLAN NOTE
She underwent colonoscopy with Dr. Crum March 23, 2017 with findings of adenomatous polyp at 30 cm, scattered diverticulosis in the left colon and distal transverse colon, poor prep and cecum not well visualized.  She underwent colonoscopy with Dr. Castellanos October 3, 2023 with findings of internal hemorrhoids, diverticulosis of the left side of the colon, and otherwise normal mucosa in the colon.

## 2025-05-01 ENCOUNTER — OFFICE VISIT (OUTPATIENT)
Dept: OPHTHALMOLOGY | Facility: CLINIC | Age: 80
End: 2025-05-01
Payer: MEDICARE

## 2025-05-01 VITALS — HEIGHT: 60 IN | WEIGHT: 181.69 LBS | BODY MASS INDEX: 35.67 KG/M2

## 2025-05-01 DIAGNOSIS — H34.8110 CENTRAL RETINAL VEIN OCCLUSION WITH MACULAR EDEMA OF RIGHT EYE: ICD-10-CM

## 2025-05-01 DIAGNOSIS — H34.8112: Primary | ICD-10-CM

## 2025-05-01 DIAGNOSIS — H04.123 DRY EYE SYNDROME OF BOTH EYES: ICD-10-CM

## 2025-05-01 DIAGNOSIS — H25.12 AGE-RELATED NUCLEAR CATARACT OF LEFT EYE: ICD-10-CM

## 2025-05-01 DIAGNOSIS — Z98.41 CATARACT EXTRACTION STATUS OF EYE, RIGHT: ICD-10-CM

## 2025-05-01 DIAGNOSIS — H35.373 EPIRETINAL MEMBRANE (ERM) OF BOTH EYES: ICD-10-CM

## 2025-05-01 PROCEDURE — 92134 CPTRZ OPH DX IMG PST SGM RTA: CPT | Mod: PBBFAC,PN | Performed by: OPHTHALMOLOGY

## 2025-05-01 PROCEDURE — 99213 OFFICE O/P EST LOW 20 MIN: CPT | Mod: PBBFAC,PN

## 2025-05-01 PROCEDURE — 92134 CPTRZ OPH DX IMG PST SGM RTA: CPT | Mod: PBBFAC,PN

## 2025-05-01 NOTE — PROGRESS NOTES
"HPI    RTC 6 months dfe/ nvi check prior to dilation OD   Patient would like a new MRX today if possible, she also stated that for   about 2 months she is getting little headaches above her right eye and it   sometimes feels "strange" kind of like her eye is swollen   Last edited by Candie Smith MA on 5/1/2025 10:09 AM.            Assessment /Plan     For exam results, see Encounter Report.    Stable hemispheric central retinal vein occlusion (CRVO) of right eye    Central retinal vein occlusion with macular edema of right eye    Age-related nuclear cataract of left eye    Epiretinal membrane (ERM) of both eyes    Cataract extraction status of eye, right    Dry eye syndrome of both eyes          OCT Mac   5/23/24  OD: 414 um, large cystic IRF diffusely  OS: 346 um, stable ERM, no IRF/SRF    9/27/24 cystic irf diffusely, 393, sl improved from prior// 330 erm no fluid, okay contour     5/1/25  OD: 282, decentered, diffuse cystoid IRF slightly improved  OS: 329, stable ERM, no SRF/IRF     IVFA 11/30/22: overexposed image ou, prp scars and expected disc staining. No leakage ou     ==================    1) CRVO OD with CME  - Unclear timing although documented 10/2019  - IVFA unable to be done due to poor venous access  - Carotid showed <50% stenosis OU.  - Lasers: PRP OD 2/16/22, with fill in 03/16/22, 7/20/22  INJECTION LOG  - Avastin (10/2019)- no response, ARYA 2 (7/14/2020) - poor response. 1/31/22 for NVD  - Ozurdex (12/4/2019) - worked great previously  - Eylea 3/3 (8/19/2020, 9/17/2020, 10/16/20  - Plan most of 2020 was to proceed with ozurdex injection if no improvement in mac edema. After discussion with Dr. Greer, does not think ozurdex would provide significant benefit over triessence. Also, there is concern that vein occlusion has worsened given lack of improvement and significant worsening of edema and vision over past 5 months at end of 2020.  - Patient elected to stop injections in 2020 given she has not " had significant vision changes with injections  - Exam 1/31/22 New NVD & frond of NVE inferotemporally.   - 11/15/22; no NVI or NVA; odd NVD vs collaterals noted OD.  Needs IVFA prior to continued injections or laser.  IOP and vision stable  - IVFA 11/30/22: overexposed image but no leakage ou. Will start spacing out visits as long as she is stable  - 5/23/24 Still with stable OCT macula. Stable vision. Stable dilated examination. Continue q4 month examinations.   - 9/27/24: no nvi, no nva. Optos done collaterals inferior on disc no nv seen. Room for prp fill if needed. Will space to q6 months for now.   - 5/1/25: no nvi, OCT mac stable. MRX updated with polycarb lenses. Continue q6mo f/u    2) Age-related nuclear cataract, left eye  - High threshold for surgery given functionally monocular, cleared by Ret[facility] for surgery  - MRX to 20/25 5/1/25 (polycarb lenses)  - Monitor    3) ERM, OU  - H/o ERM OD s/p 25g PPV, ERM/ILM peel with FAx 5/23/17. Stable  - H/o ERM OS, NVS, OCT mac stable  - Monitor    4) Pseudophakia of right eye  - Done 4/24/12 by Dr. Little, SN60WF 18.5D  - Monitor    5) Posterior vitreous detachment, left eye  - No holes/tears/breaks on DFE  - RD precautions    6) Dry eye syndrome of both lacrimal glands  - AT 4-6x/day as needed, lid hygiene    RTC 6 months NVI/NVA check prior to dilation then DFE

## 2025-05-21 DIAGNOSIS — K21.9 GASTROESOPHAGEAL REFLUX DISEASE, UNSPECIFIED WHETHER ESOPHAGITIS PRESENT: ICD-10-CM

## 2025-05-21 RX ORDER — FAMOTIDINE 20 MG/1
20 TABLET, FILM COATED ORAL 2 TIMES DAILY
Qty: 180 TABLET | Refills: 0 | Status: SHIPPED | OUTPATIENT
Start: 2025-05-21

## 2025-05-26 DIAGNOSIS — I10 PRIMARY HYPERTENSION: ICD-10-CM

## 2025-05-26 DIAGNOSIS — E78.2 MIXED HYPERLIPIDEMIA: ICD-10-CM

## 2025-05-26 RX ORDER — LOSARTAN POTASSIUM 50 MG/1
50 TABLET ORAL DAILY
Qty: 90 TABLET | Refills: 3 | Status: SHIPPED | OUTPATIENT
Start: 2025-05-26 | End: 2026-05-26

## 2025-05-26 RX ORDER — ATORVASTATIN CALCIUM 20 MG/1
20 TABLET, FILM COATED ORAL DAILY
Qty: 90 TABLET | Refills: 3 | Status: SHIPPED | OUTPATIENT
Start: 2025-05-26 | End: 2026-05-26

## 2025-06-10 ENCOUNTER — HOSPITAL ENCOUNTER (EMERGENCY)
Facility: HOSPITAL | Age: 80
Discharge: HOME | End: 2025-06-10
Attending: INTERNAL MEDICINE
Payer: MEDICARE

## 2025-06-10 VITALS
HEART RATE: 64 BPM | BODY MASS INDEX: 37.09 KG/M2 | DIASTOLIC BLOOD PRESSURE: 62 MMHG | HEIGHT: 59 IN | WEIGHT: 184 LBS | TEMPERATURE: 97 F | SYSTOLIC BLOOD PRESSURE: 150 MMHG | OXYGEN SATURATION: 98 % | RESPIRATION RATE: 18 BRPM

## 2025-06-10 DIAGNOSIS — I10 UNCONTROLLED HYPERTENSION: ICD-10-CM

## 2025-06-10 DIAGNOSIS — R07.9 CHEST PAIN: ICD-10-CM

## 2025-06-10 DIAGNOSIS — M25.519 SHOULDER PAIN: ICD-10-CM

## 2025-06-10 DIAGNOSIS — R07.89 MUSCULAR CHEST PAIN: Primary | ICD-10-CM

## 2025-06-10 LAB
ANION GAP SERPL CALC-SCNC: 9 MEQ/L
BASOPHILS # BLD AUTO: 0.04 X10(3)/MCL
BASOPHILS NFR BLD AUTO: 0.4 %
BUN SERPL-MCNC: 26.8 MG/DL (ref 9.8–20.1)
CALCIUM SERPL-MCNC: 9.6 MG/DL (ref 8.4–10.2)
CHLORIDE SERPL-SCNC: 111 MMOL/L (ref 98–107)
CO2 SERPL-SCNC: 24 MMOL/L (ref 23–31)
CREAT SERPL-MCNC: 0.72 MG/DL (ref 0.55–1.02)
CREAT/UREA NIT SERPL: 37
EOSINOPHIL # BLD AUTO: 0.17 X10(3)/MCL (ref 0–0.9)
EOSINOPHIL NFR BLD AUTO: 1.8 %
ERYTHROCYTE [DISTWIDTH] IN BLOOD BY AUTOMATED COUNT: 13 % (ref 11.5–17)
GFR SERPLBLD CREATININE-BSD FMLA CKD-EPI: >60 ML/MIN/1.73/M2
GLUCOSE SERPL-MCNC: 101 MG/DL (ref 82–115)
HCT VFR BLD AUTO: 39.5 % (ref 37–47)
HGB BLD-MCNC: 12.7 G/DL (ref 12–16)
HOLD SPECIMEN: NORMAL
IMM GRANULOCYTES # BLD AUTO: 0.03 X10(3)/MCL (ref 0–0.04)
IMM GRANULOCYTES NFR BLD AUTO: 0.3 %
LYMPHOCYTES # BLD AUTO: 2.62 X10(3)/MCL (ref 0.6–4.6)
LYMPHOCYTES NFR BLD AUTO: 27 %
MCH RBC QN AUTO: 30.5 PG (ref 27–31)
MCHC RBC AUTO-ENTMCNC: 32.2 G/DL (ref 33–36)
MCV RBC AUTO: 95 FL (ref 80–94)
MONOCYTES # BLD AUTO: 0.73 X10(3)/MCL (ref 0.1–1.3)
MONOCYTES NFR BLD AUTO: 7.5 %
NEUTROPHILS # BLD AUTO: 6.1 X10(3)/MCL (ref 2.1–9.2)
NEUTROPHILS NFR BLD AUTO: 63 %
NRBC BLD AUTO-RTO: 0 %
OHS QRS DURATION: 92 MS
OHS QTC CALCULATION: 431 MS
PLATELET # BLD AUTO: 227 X10(3)/MCL (ref 130–400)
PMV BLD AUTO: 9.8 FL (ref 7.4–10.4)
POTASSIUM SERPL-SCNC: 4.4 MMOL/L (ref 3.5–5.1)
RBC # BLD AUTO: 4.16 X10(6)/MCL (ref 4.2–5.4)
SODIUM SERPL-SCNC: 144 MMOL/L (ref 136–145)
TROPONIN I SERPL-MCNC: <0.01 NG/ML (ref 0–0.04)
WBC # BLD AUTO: 9.69 X10(3)/MCL (ref 4.5–11.5)

## 2025-06-10 PROCEDURE — 96372 THER/PROPH/DIAG INJ SC/IM: CPT | Performed by: INTERNAL MEDICINE

## 2025-06-10 PROCEDURE — 93005 ELECTROCARDIOGRAM TRACING: CPT

## 2025-06-10 PROCEDURE — 84484 ASSAY OF TROPONIN QUANT: CPT | Performed by: INTERNAL MEDICINE

## 2025-06-10 PROCEDURE — 85025 COMPLETE CBC W/AUTO DIFF WBC: CPT | Performed by: INTERNAL MEDICINE

## 2025-06-10 PROCEDURE — 80048 BASIC METABOLIC PNL TOTAL CA: CPT | Performed by: INTERNAL MEDICINE

## 2025-06-10 PROCEDURE — 99285 EMERGENCY DEPT VISIT HI MDM: CPT | Mod: 25

## 2025-06-10 PROCEDURE — 63600175 PHARM REV CODE 636 W HCPCS: Mod: JZ,TB | Performed by: INTERNAL MEDICINE

## 2025-06-10 RX ORDER — MELOXICAM 7.5 MG/1
7.5 TABLET ORAL DAILY
Qty: 10 TABLET | Refills: 0 | Status: SHIPPED | OUTPATIENT
Start: 2025-06-10 | End: 2025-06-20

## 2025-06-10 RX ORDER — KETOROLAC TROMETHAMINE 30 MG/ML
15 INJECTION, SOLUTION INTRAMUSCULAR; INTRAVENOUS
Status: COMPLETED | OUTPATIENT
Start: 2025-06-10 | End: 2025-06-10

## 2025-06-10 RX ADMIN — KETOROLAC TROMETHAMINE 15 MG: 60 INJECTION, SOLUTION INTRAMUSCULAR at 06:06

## 2025-06-10 NOTE — ED PROVIDER NOTES
"Encounter Date: 6/10/2025       History     Chief Complaint   Patient presents with    Arm Injury    Back Pain    Chest Pain     Patient reports right arm pain, back pain, and right sided chest pain x 2 weeks that was its worst last night.      Presents complaining of right shoulder and chest pain for the last 2-3 weeks. States pain radiated to her back. Denie sinjury, SOB, rash, states noticed a "lump" under her clavicle area. Hx of HTN, HLD    The history is provided by the patient.     Review of patient's allergies indicates:   Allergen Reactions    Codeine Rash     Past Medical History:   Diagnosis Date    Anxiety 06/13/2022    Cataract of left eye 06/13/2022    Central retinal vein occlusion of right eye 06/13/2022    Female bladder prolapse 06/13/2022    Gastroesophageal reflux disease 06/13/2022    Hyperlipidemia 06/13/2022    Hypertension 06/13/2022    Mild CAD     Osteopenia of neck of femur 06/13/2022    Urinary incontinence 06/13/2022    Zenker diverticulum      Past Surgical History:   Procedure Laterality Date    ANGIOGRAM, CORONARY, WITH LEFT HEART CATHETERIZATION N/A 6/28/2023    Procedure: Angiogram, Coronary, with Left Heart Cath;  Surgeon: Kaveh Morales MD;  Location: [facility] CATH LAB;  Service: Cardiology;  Laterality: N/A;    CHOLECYSTECTOMY      COLONOSCOPY  2017    HYSTERECTOMY      KNEE SURGERY Right     x2; states MD wanted to do replacement but pt declined    SHOULDER SURGERY  1995    TONSILLECTOMY      VITRECTOMY       Family History   Problem Relation Name Age of Onset    Hypertension Mother      No Known Problems Father      No Known Problems Sister      No Known Problems Brother      No Known Problems Maternal Aunt      No Known Problems Maternal Uncle      No Known Problems Paternal Aunt      No Known Problems Paternal Uncle      Diabetes Maternal Grandmother      No Known Problems Maternal Grandfather      No Known Problems Paternal Grandmother      No Known Problems Paternal " Grandfather      Amblyopia Neg Hx      Blindness Neg Hx      Cancer Neg Hx      Cataracts Neg Hx      Glaucoma Neg Hx      Macular degeneration Neg Hx      Retinal detachment Neg Hx      Strabismus Neg Hx      Stroke Neg Hx      Thyroid disease Neg Hx       Social History[1]  Review of Systems   Cardiovascular:  Positive for chest pain.   Musculoskeletal:  Positive for arthralgias and back pain.       Physical Exam     Initial Vitals [06/10/25 0614]   BP Pulse Resp Temp SpO2   (!) 201/90 77 18 96.8 °F (36 °C) 97 %      MAP       --         Physical Exam    Nursing note and vitals reviewed.  Constitutional: She appears well-developed.   HENT:   Head: Normocephalic and atraumatic. Mouth/Throat: Oropharynx is clear and moist.   Eyes: Conjunctivae and EOM are normal. Pupils are equal, round, and reactive to light.   Neck: Neck supple.   Normal range of motion.  Cardiovascular:  Normal rate, regular rhythm, normal heart sounds and intact distal pulses.           Pulmonary/Chest: Breath sounds normal. She exhibits tenderness (Rt pectoralis minor area tenderness).   Abdominal: Abdomen is soft. Bowel sounds are normal. She exhibits no distension. There is no abdominal tenderness. There is no rebound and no guarding.   Musculoskeletal:         General: Tenderness present. No edema. Normal range of motion.      Cervical back: Normal range of motion and neck supple.      Comments: Pain reproducible by Rt shoulder movement     Neurological: She is alert and oriented to person, place, and time. She has normal strength. GCS score is 15. GCS eye subscore is 4. GCS verbal subscore is 5. GCS motor subscore is 6.   Skin: Skin is warm and dry. No rash noted.   Psychiatric: Thought content normal.         ED Course   Procedures  Labs Reviewed   BASIC METABOLIC PANEL - Abnormal       Result Value    Sodium 144      Potassium 4.4      Chloride 111 (*)     CO2 24      Glucose 101      Blood Urea Nitrogen 26.8 (*)     Creatinine 0.72       BUN/Creatinine Ratio 37      Calcium 9.6      Anion Gap 9.0      eGFR >60     CBC WITH DIFFERENTIAL - Abnormal    WBC 9.69      RBC 4.16 (*)     Hgb 12.7      Hct 39.5      MCV 95.0 (*)     MCH 30.5      MCHC 32.2 (*)     RDW 13.0      Platelet 227      MPV 9.8      Neut % 63.0      Lymph % 27.0      Mono % 7.5      Eos % 1.8      Basophil % 0.4      Imm Grans % 0.3      Neut # 6.10      Lymph # 2.62      Mono # 0.73      Eos # 0.17      Baso # 0.04      Imm Gran # 0.03      NRBC% 0.0     TROPONIN I - Normal    Troponin-I <0.010     CBC W/ AUTO DIFFERENTIAL    Narrative:     The following orders were created for panel order CBC auto differential.  Procedure                               Abnormality         Status                     ---------                               -----------         ------                     CBC with Differential[[id number]]       Abnormal            Final result                 Please view results for these tests on the individual orders.   EXTRA TUBES    Narrative:     The following orders were created for panel order EXTRA TUBES.  Procedure                               Abnormality         Status                     ---------                               -----------         ------                     Light Blue Top Hold[[id number]]                             In process                 Red Top Hold[[id number]]                                    In process                 Lavender Top Hold[[id number]]                               In process                 Gold Top Hold[[id number]]                                   In process                 Pink Top Hold[[id number]]                                   In process                   Please view results for these tests on the individual orders.   LIGHT BLUE TOP HOLD   RED TOP HOLD   LAVENDER TOP HOLD   GOLD TOP HOLD   PINK TOP HOLD     EKG Readings: (Independently Interpreted)   Initial Reading: No STEMI. Rhythm: Normal Sinus Rhythm. Heart  Rate: 66. Ectopy: No Ectopy. Conduction: Normal. ST Segments: Normal ST Segments. T Waves: Normal. Axis: Left Axis Deviation. Clinical Impression: Normal Sinus Rhythm     ECG Results              EKG 12-lead (In process)        Collection Time Result Time QRS Duration [facility] QTC Calculation    06/10/25 06:19:03 06/10/25 06:33:23 92 431                     In process by Interface, Lab In [facility] (06/10/25 06:33:31)                   Narrative:    Test Reason : R07.9,    Vent. Rate :  66 BPM     Atrial Rate :  66 BPM     P-R Int : 158 ms          QRS Dur :  92 ms      QT Int : 412 ms       P-R-T Axes :  59 -12  50 degrees    QTcB Int : 431 ms    Normal sinus rhythm  Low voltage QRS  Cannot rule out Anterior infarct (cited on or before 19-May-2023)  Abnormal ECG  When compared with ECG of 25-Oct-2024 09:55,  Questionable change in initial forces of Lateral leads  Nonspecific T wave abnormality no longer evident in Anterior leads    Referred By:            Confirmed By:                                   Imaging Results              X-Ray Shoulder Complete 2 View Right (In process)                      X-Ray Chest PA And Lateral (In process)                   X-Rays:   Independently Interpreted Readings:   Chest X-Ray: Normal heart size.  No infiltrates.  No acute abnormalities.   Other Readings:  Rt shoulder: DJD with calcified tendiniti    Medications   ketorolac injection 15 mg (15 mg Intramuscular Given 6/10/25 0625)     Medical Decision Making  Amount and/or Complexity of Data Reviewed  Labs: ordered. Decision-making details documented in ED Course.  Radiology: ordered and independent interpretation performed. Decision-making details documented in ED Course.  ECG/medicine tests: ordered and independent interpretation performed. Decision-making details documented in ED Course.    Risk  Prescription drug management.      Additional MDM:   Differential Diagnosis:   Miocardial infarction, pneumothorax, aortic dissection,  pulmonary emboli, pneumonia, among others                                        Clinical Impression:  Final diagnoses:  [R07.9] Chest pain  [M25.519] Shoulder pain  [R07.89] Muscular chest pain (Primary)  [I10] Uncontrolled hypertension          ED Disposition Condition    Discharge Stable          ED Prescriptions       Medication Sig Dispense Start Date End Date Auth. Provider    meloxicam (MOBIC) 7.5 MG tablet Take 1 tablet (7.5 mg total) by mouth once daily. for 10 days 10 tablet 6/10/2025 6/20/2025 Nimesh Williamson MD          Follow-up Information       Follow up With Specialties Details Why Contact Info    Carey Gonzalez DO Family Medicine Schedule an appointment as soon as possible for a visit in 2 weeks  [address] [facility] [address]  [phone]      [facility] Emergency Medicine  If symptoms worsen [address] [facility] [phone]                   [1]   Social History  Tobacco Use    Smoking status: Never     Passive exposure: Past    Smokeless tobacco: Never   Substance Use Topics    Alcohol use: Never    Drug use: Never        Nimesh Williamson MD  06/10/25 0711

## 2025-06-30 NOTE — PROGRESS NOTES
CHIEF COMPLAINT:   Chief Complaint   Patient presents with    Follow-up     Denies any cardiac problems                                                  HPI:  Nu Denny 80 y.o. female with PMH of Nonobstructive CAD per [place] 6.28.23, HTN, HLD, GERD, Reports hx of CVA 2019?, osteopenia, chronic neck pain who presents to cardiology clinic for routine follow-up and ongoing care.     Latest Echocardiogram obtained on 1.24.23 revealed a preserved EF- 65%, Grade I Diastolic Dysfunction.  Most recent ischemic evaluation includes a Left Heart Catheterization on 6.28.23 due to an abnormal nuclear stress test that revealed nonobstructive coronary artery disease (30% mid LAD stenosis). (See operative report below).   Patient was noted to have an emergency room visit last month with musculoskeletal chest pain after lifting furniture.  ACS was ruled out.  Troponin negative and EKG without any acute ischemic changes.  The patient's symptoms were relieved with Toradol and she was discharged with meloxicam.  Today the patient denies any acute cardiovascular complaints of chest pain, shortness of breath, palpitations, orthopnea, PND, peripheral edema, claudication, lightheadedness, dizziness, near-syncope, syncope or falls.  She is able to complete her routine heavy housework and ADLs without experiencing any anginal or anginal equivalent symptoms.  She also cares for her 2 dogs and 1 cat without any ischemic symptoms                                                                                                                                                                                                                                                                                                                                                                                                                                                                           CARDIAC TESTING:    Echo 1.24.23    Interpretation  Summary  · The left ventricle is normal in size with concentric remodeling and normal systolic function.  · The estimated ejection fraction is 65%.  · Grade I left ventricular diastolic dysfunction.  · Normal right ventricular size with normal right ventricular systolic function.  · There is no pulmonary hypertension.  · The estimated PA systolic pressure is 25 mmHg.  · Normal central venous pressure (3 mmHg).        Nuclear Stress - Cardiology Interpreted 5.19.23    Interpretation Summary    Abnormal myocardial perfusion scan.    There is a moderate intensity, small to moderate sized, reversible perfusion abnormality that is consistent with ischemia in the distal to apical apical wall(s) in the typical distribution of the LAD territory.    There are no other significant perfusion abnormalities.    The gated perfusion images showed an ejection fraction of 70% at rest. The gated perfusion images showed an ejection fraction of 80% post stress.    The patient reported no chest pain during the stress test.    There were no arrhythmias during stress.    On the nuclear stress test the EF is normal.  If the patient has an echo, the EF on the echo is considered more accurate.    The patient has a low to moderate risk nuclear stress test.    If patient is symptomatic, consider management with two anti-anginals      Cardiac catheterization  6.28.23    Conclusion    The pre-procedure left ventricular end diastolic pressure was 18.    The estimated blood loss was <50 mL.    There was non-obstructive coronary artery disease..    FINDINGS  LVEDP:  18 mmHg  Left Main:  No stenosis  LAD:   30% mid LAD stenosis  Circumflex:  No stenosis  RCA:  No stenosis  The patient has  Non-obstructive CAD  Blood loss:  less than 50 cc.    RECOMMENDATIONS  Medical management  Risk factor modifications -- smoking cessation  Activity -- avoid straining with affected limb for one week  Exercise on regular basis.  Precautions post D/C -- come to ER  for hematoma, unusual pain, erythema, or unusual drainage at access site  Precautions post D/C -- if develop bleeding or hematoma at access site, hold pressure at access site, and come to ER        The procedure log was documented by Documenter: Janet Pryor RN and verified by Spencer Rae MD.    Date: 6/28/2023  Time: 8:15 AM       Patient Active Problem List   Diagnosis    Hypertension    Hyperlipidemia    Anxiety    Gastroesophageal reflux disease    Osteopenia of neck of femur    Central retinal vein occlusion of right eye    Cataract of left eye    Urinary incontinence    Female bladder prolapse    Herpes zoster oticus    Pulmonary nodules    Coronary artery disease involving native coronary artery of native heart without angina pectoris    Diverticulitis    Chronic constipation    History of colonic polyps     Past Surgical History:   Procedure Laterality Date    ANGIOGRAM, CORONARY, WITH LEFT HEART CATHETERIZATION N/A 6/28/2023    Procedure: Angiogram, Coronary, with Left Heart Cath;  Surgeon: Kaveh Morales MD;  Location: [facility] CATH LAB;  Service: Cardiology;  Laterality: N/A;    CHOLECYSTECTOMY      COLONOSCOPY  2017    HYSTERECTOMY      KNEE SURGERY Right     x2; states MD wanted to do replacement but pt declined    SHOULDER SURGERY  1995    TONSILLECTOMY      VITRECTOMY       Social History     Socioeconomic History    Marital status:    Tobacco Use    Smoking status: Never     Passive exposure: Past    Smokeless tobacco: Never   Substance and Sexual Activity    Alcohol use: Never    Drug use: Never        Family History   Problem Relation Name Age of Onset    Hypertension Mother      No Known Problems Father      No Known Problems Sister      No Known Problems Brother      No Known Problems Maternal Aunt      No Known Problems Maternal Uncle      No Known Problems Paternal Aunt      No Known Problems Paternal Uncle      Diabetes Maternal Grandmother      No Known Problems Maternal  Grandfather      No Known Problems Paternal Grandmother      No Known Problems Paternal Grandfather      Amblyopia Neg Hx      Blindness Neg Hx      Cancer Neg Hx      Cataracts Neg Hx      Glaucoma Neg Hx      Macular degeneration Neg Hx      Retinal detachment Neg Hx      Strabismus Neg Hx      Stroke Neg Hx      Thyroid disease Neg Hx       Review of patient's allergies indicates:   Allergen Reactions    Codeine Rash         ROS:  Review of Systems   Constitutional: Negative.    HENT: Negative.     Eyes: Negative.    Respiratory: Negative.  Negative for shortness of breath.    Cardiovascular: Negative.    Gastrointestinal: Negative.    Genitourinary: Negative.    Musculoskeletal: Negative.    Skin: Negative.    Neurological: Negative.    Endo/Heme/Allergies: Negative.    Psychiatric/Behavioral: Negative.                                                                                                                                                                                  Negative except as stated in the history of present illness. See HPI for details.    PHYSICAL EXAM:  Visit Vitals  /71 (BP Location: Left arm, Patient Position: Sitting)   Pulse 76   Temp 98.1 °F (36.7 °C) (Oral)   Resp 18   Ht 5' (1.524 m)   Wt 81.8 kg (180 lb 6.4 oz)   SpO2 98%   BMI 35.23 kg/m²           Physical Exam  HENT:      Head: Normocephalic.      Nose: Nose normal.   Eyes:      Pupils: Pupils are equal, round, and reactive to light.   Cardiovascular:      Rate and Rhythm: Normal rate and regular rhythm.   Pulmonary:      Effort: Pulmonary effort is normal.   Abdominal:      General: Abdomen is flat. Bowel sounds are normal.      Palpations: Abdomen is soft.   Musculoskeletal:         General: Normal range of motion.      Cervical back: Normal range of motion.   Skin:     General: Skin is warm.   Neurological:      General: No focal deficit present.      Mental Status: She is alert.   Psychiatric:         Mood and Affect:  Mood normal.         Current Outpatient Medications   Medication Instructions    acetaminophen (TYLENOL ARTHRITIS ORAL) 2 tablets, 3 times daily    APPLE CIDER VINEGAR ORAL Take by mouth.    aspirin (ECOTRIN) 81 mg, Daily    atorvastatin (LIPITOR) 20 mg, Oral, Daily    calcium carbonate (OS-SREEDHAR) 500 mg calcium (1,250 mg) chewable tablet 1 tablet, Daily    diclofenac sodium (VOLTAREN ARTHRITIS PAIN) 2 g, Topical (Top), Daily    dicyclomine (BENTYL) 20 mg, Oral, 4 times daily PRN    famotidine (PEPCID) 20 mg, Oral, 2 times daily    fluocinolone acetonide oiL 0.01 % Drop 4 drops, Otic, 2 times daily, In affected ear for itching X 10 days    loratadine (CLARITIN) 10 mg, Oral, Daily    losartan (COZAAR) 50 mg, Oral, Daily    metroNIDAZOLE (METROGEL) 0.75 % gel Topical (Top), 2 times daily, Apply to face PRN for rosacea related rash    multivitamin capsule 1 capsule, Daily    ondansetron (ZOFRAN-ODT) 4 mg, Oral, Every 8 hours PRN    polyethylene glycol (GLYCOLAX) 17 g, Oral, Daily    sertraline (ZOLOFT) 50 mg, Oral, Daily    vitamin D (VITAMIN D3) 1,000 Units, Daily        All medications, laboratory studies, cardiac diagnostic imaging reviewed.     Lab Results   Component Value Date    LDL 85.00 08/02/2024    LDL 98.00 03/31/2023    TRIG 125 08/02/2024    TRIG 105 03/31/2023    CREATININE 0.72 06/10/2025    MG 2.30 09/05/2024    K 4.4 06/10/2025        ASSESSMENT/PLAN:  Nonobstructive CAD  - [facility]- nonobstructive CAD, 30% mid LAD stenosis ( 6.28.23)  - EF -65% per ECHO 1.24.23  - Denies anginal or anginal equivalent symptoms  - Continue ASA, Atorvastatin 20 mg, and losartan 50 mg  - Advised on heart healthy, low-cholesterol diet and activity as tolerated      HTN  - BP at goal 126/71  - Continue current medications losartan 50 mg   - Advised on low salt diet and activity as tolerated     HLD  - LDL-85- at goal   - Continue Atorvastatin 20 mg   - Advised on low-cholesterol, low-fat diet and exercise as tolerated      Follow  up in cardiology clinic in 6 months or sooner if needed  Follow up with PCP as directed

## 2025-07-01 ENCOUNTER — OFFICE VISIT (OUTPATIENT)
Dept: CARDIOLOGY | Facility: CLINIC | Age: 80
End: 2025-07-01
Payer: MEDICARE

## 2025-07-01 VITALS
BODY MASS INDEX: 35.41 KG/M2 | HEIGHT: 60 IN | TEMPERATURE: 98 F | OXYGEN SATURATION: 98 % | RESPIRATION RATE: 18 BRPM | SYSTOLIC BLOOD PRESSURE: 126 MMHG | HEART RATE: 76 BPM | DIASTOLIC BLOOD PRESSURE: 71 MMHG | WEIGHT: 180.38 LBS

## 2025-07-01 DIAGNOSIS — E78.2 MIXED HYPERLIPIDEMIA: ICD-10-CM

## 2025-07-01 DIAGNOSIS — I25.10 CORONARY ARTERY DISEASE INVOLVING NATIVE CORONARY ARTERY OF NATIVE HEART WITHOUT ANGINA PECTORIS: Primary | ICD-10-CM

## 2025-07-01 DIAGNOSIS — I10 PRIMARY HYPERTENSION: ICD-10-CM

## 2025-07-01 PROCEDURE — 99215 OFFICE O/P EST HI 40 MIN: CPT | Mod: PBBFAC | Performed by: NURSE PRACTITIONER

## 2025-07-01 PROCEDURE — 99214 OFFICE O/P EST MOD 30 MIN: CPT | Mod: S$PBB,,, | Performed by: NURSE PRACTITIONER

## 2025-07-23 ENCOUNTER — OFFICE VISIT (OUTPATIENT)
Dept: FAMILY MEDICINE | Facility: CLINIC | Age: 80
End: 2025-07-23
Payer: MEDICARE

## 2025-07-23 VITALS
TEMPERATURE: 98 F | SYSTOLIC BLOOD PRESSURE: 135 MMHG | OXYGEN SATURATION: 98 % | BODY MASS INDEX: 36.52 KG/M2 | HEIGHT: 60 IN | HEART RATE: 65 BPM | DIASTOLIC BLOOD PRESSURE: 81 MMHG | WEIGHT: 186 LBS

## 2025-07-23 DIAGNOSIS — M85.851 OSTEOPENIA OF NECKS OF BOTH FEMURS: ICD-10-CM

## 2025-07-23 DIAGNOSIS — E78.2 MIXED HYPERLIPIDEMIA: ICD-10-CM

## 2025-07-23 DIAGNOSIS — L71.9 ROSACEA: ICD-10-CM

## 2025-07-23 DIAGNOSIS — M85.852 OSTEOPENIA OF NECKS OF BOTH FEMURS: ICD-10-CM

## 2025-07-23 DIAGNOSIS — I10 PRIMARY HYPERTENSION: Primary | ICD-10-CM

## 2025-07-23 LAB
ALBUMIN SERPL-MCNC: 3.9 G/DL (ref 3.4–4.8)
ALBUMIN/GLOB SERPL: 1.1 RATIO (ref 1.1–2)
ALP SERPL-CCNC: 71 UNIT/L (ref 40–150)
ALT SERPL-CCNC: 21 UNIT/L (ref 0–55)
ANION GAP SERPL CALC-SCNC: 5 MEQ/L
AST SERPL-CCNC: 22 UNIT/L (ref 11–45)
BASOPHILS # BLD AUTO: 0.03 X10(3)/MCL
BASOPHILS NFR BLD AUTO: 0.5 %
BILIRUB SERPL-MCNC: 0.9 MG/DL
BUN SERPL-MCNC: 20 MG/DL (ref 9.8–20.1)
CALCIUM SERPL-MCNC: 9.9 MG/DL (ref 8.4–10.2)
CHLORIDE SERPL-SCNC: 109 MMOL/L (ref 98–107)
CHOLEST SERPL-MCNC: 187 MG/DL
CHOLEST/HDLC SERPL: 3 {RATIO} (ref 0–5)
CO2 SERPL-SCNC: 28 MMOL/L (ref 23–31)
CREAT SERPL-MCNC: 0.73 MG/DL (ref 0.55–1.02)
CREAT/UREA NIT SERPL: 27
EOSINOPHIL # BLD AUTO: 0.21 X10(3)/MCL (ref 0–0.9)
EOSINOPHIL NFR BLD AUTO: 3.6 %
ERYTHROCYTE [DISTWIDTH] IN BLOOD BY AUTOMATED COUNT: 13.2 % (ref 11.5–17)
GFR SERPLBLD CREATININE-BSD FMLA CKD-EPI: >60 ML/MIN/1.73/M2
GLOBULIN SER-MCNC: 3.5 GM/DL (ref 2.4–3.5)
GLUCOSE SERPL-MCNC: 88 MG/DL (ref 82–115)
HCT VFR BLD AUTO: 39.9 % (ref 37–47)
HDLC SERPL-MCNC: 70 MG/DL (ref 35–60)
HGB BLD-MCNC: 12.5 G/DL (ref 12–16)
IMM GRANULOCYTES # BLD AUTO: 0.01 X10(3)/MCL (ref 0–0.04)
IMM GRANULOCYTES NFR BLD AUTO: 0.2 %
LDLC SERPL CALC-MCNC: 96 MG/DL (ref 50–140)
LYMPHOCYTES # BLD AUTO: 2.71 X10(3)/MCL (ref 0.6–4.6)
LYMPHOCYTES NFR BLD AUTO: 45.9 %
MCH RBC QN AUTO: 30.3 PG (ref 27–31)
MCHC RBC AUTO-ENTMCNC: 31.3 G/DL (ref 33–36)
MCV RBC AUTO: 96.8 FL (ref 80–94)
MONOCYTES # BLD AUTO: 0.64 X10(3)/MCL (ref 0.1–1.3)
MONOCYTES NFR BLD AUTO: 10.8 %
NEUTROPHILS # BLD AUTO: 2.31 X10(3)/MCL (ref 2.1–9.2)
NEUTROPHILS NFR BLD AUTO: 39 %
NRBC BLD AUTO-RTO: 0 %
PLATELET # BLD AUTO: 243 X10(3)/MCL (ref 130–400)
PMV BLD AUTO: 10.3 FL (ref 7.4–10.4)
POTASSIUM SERPL-SCNC: 4.1 MMOL/L (ref 3.5–5.1)
PROT SERPL-MCNC: 7.4 GM/DL (ref 5.8–7.6)
RBC # BLD AUTO: 4.12 X10(6)/MCL (ref 4.2–5.4)
SODIUM SERPL-SCNC: 142 MMOL/L (ref 136–145)
TRIGL SERPL-MCNC: 107 MG/DL (ref 37–140)
TSH SERPL-ACNC: 1.51 UIU/ML (ref 0.35–4.94)
VLDLC SERPL CALC-MCNC: 21 MG/DL
WBC # BLD AUTO: 5.91 X10(3)/MCL (ref 4.5–11.5)

## 2025-07-23 PROCEDURE — 80061 LIPID PANEL: CPT

## 2025-07-23 PROCEDURE — 99214 OFFICE O/P EST MOD 30 MIN: CPT | Mod: PBBFAC

## 2025-07-23 PROCEDURE — 84443 ASSAY THYROID STIM HORMONE: CPT

## 2025-07-23 PROCEDURE — 80053 COMPREHEN METABOLIC PANEL: CPT

## 2025-07-23 PROCEDURE — 85025 COMPLETE CBC W/AUTO DIFF WBC: CPT

## 2025-07-23 RX ORDER — SERTRALINE HYDROCHLORIDE 50 MG/1
50 TABLET, FILM COATED ORAL DAILY
Qty: 90 TABLET | Refills: 1 | Status: SHIPPED | OUTPATIENT
Start: 2025-07-23

## 2025-07-23 RX ORDER — METRONIDAZOLE TOPICAL 7.5 MG/G
GEL TOPICAL 2 TIMES DAILY
Qty: 45 G | Refills: 1 | Status: SHIPPED | OUTPATIENT
Start: 2025-07-23 | End: 2026-07-23

## 2025-07-23 RX ORDER — ALENDRONATE SODIUM TABLET 35 MG/1
35 TABLET ORAL
Qty: 4 TABLET | Refills: 5 | Status: SHIPPED | OUTPATIENT
Start: 2025-07-23

## 2025-07-23 RX ORDER — POLYETHYLENE GLYCOL 3350 17 G/17G
17 POWDER, FOR SOLUTION ORAL DAILY
Qty: 595 G | Refills: 0 | Status: SHIPPED | OUTPATIENT
Start: 2025-07-23

## 2025-07-23 RX ORDER — LORATADINE 10 MG/1
10 TABLET ORAL DAILY
Qty: 30 TABLET | Refills: 4 | Status: SHIPPED | OUTPATIENT
Start: 2025-07-23

## 2025-07-23 NOTE — PROGRESS NOTES
[facility] Note   [facility]  [address] [place] [address]    Subjective     Patient ID: Nu Denny is a 80 y.o. female.    Chief Complaint: Follow-up, Osteoarthritis, and Medication Refill    HPI  Interval hx: since last office visit, patient saw ENT, Ophtho, Cardiology, and established with [facility]. [facility] ED visit March 2025 due to cut on right middkle finger, required 2 stitches and tdap booster. ED visit on 6/10/2025 for chest pain. Negative troponin, EKG wnl, felt to be musculoskeletal in origin. Now pain is resolved. Denies recent falls.   - Lives alone with 2 small dogs and 1 cat. Adult sone visits often. Independent with ADLS and iADLS  - Blood pressure well controlled with medication.   - now having mild neck pain due to repetitive sweeping and moping, better with rest. Not taking any otc medications  - continues low inflammatory diet  - started prolia 6/2022, last dose 1/2025. Dexa scan from 2024 similar to previous from 2022.    PMHx:  Active Problem List with Overview Notes    Diagnosis Date Noted    Zenker diverticulum      Ct head and neck with stable size. 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/2022    Hypertension 06/13/2022    Hyperlipidemia 06/13/2022    Anxiety 06/13/2022    Gastroesophageal reflux disease 06/13/2022    Osteopenia of neck of femur 06/13/2022    Central retinal vein occlusion of right eye 06/13/2022    Cataract of left eye 06/13/2022    Urinary incontinence 06/13/2022    Female bladder prolapse 06/13/2022      Care Team:  [facility]  [facility]  [facility]  [facility]    Current Outpatient Medications   Medication Instructions    acetaminophen (TYLENOL  ARTHRITIS ORAL) 2 tablets, 3 times daily    alendronate (FOSAMAX) 35 mg, Oral, Every 7 days    APPLE CIDER VINEGAR ORAL Take by mouth.    aspirin (ECOTRIN) 81 mg, Daily    atorvastatin (LIPITOR) 20 mg, Oral, Daily    calcium carbonate (OS-SREEDHAR) 500 mg calcium (1,250 mg) chewable tablet 1 tablet, Daily    dicyclomine (BENTYL) 20 mg, Oral, 4 times daily PRN    famotidine (PEPCID) 20 mg, Oral, 2 times daily    loratadine (CLARITIN) 10 mg, Oral, Daily    losartan (COZAAR) 50 mg, Oral, Daily    metroNIDAZOLE (METROGEL) 0.75 % gel Topical (Top), 2 times daily, Apply to face PRN for rosacea related rash    multivitamin capsule 1 capsule, Daily    polyethylene glycol (GLYCOLAX) 17 g, Oral, Daily    sertraline (ZOLOFT) 50 mg, Oral, Daily    vitamin D (VITAMIN D3) 1,000 Units, Daily       Review of Systems   Constitutional:  Negative for fever.   Respiratory:  Negative for shortness of breath.    Cardiovascular:  Negative for chest pain, palpitations and leg swelling.   Gastrointestinal:  Negative for abdominal pain, constipation, diarrhea and vomiting.   Genitourinary:  Negative for dysuria.        Objective     Vitals:    07/23/25 0921   BP: 135/81   Pulse: 65   Temp: 97.8 °F (36.6 °C)   TempSrc: Oral   SpO2: 98%   Weight: 84.4 kg (186 lb)   Height: 5' (1.524 m)        Physical Exam  Vitals reviewed.   Cardiovascular:      Rate and Rhythm: Normal rate and regular rhythm.   Pulmonary:      Effort: Pulmonary effort is normal. No respiratory distress.   Abdominal:      General: There is no distension.      Palpations: Abdomen is soft.      Tenderness: There is no abdominal tenderness. There is no guarding.   Musculoskeletal:      Right lower leg: No edema.      Left lower leg: No edema.   Skin:     Comments: Bilateral cheeks and nose without redness    Neurological:      Mental Status: She is alert.        Labs      Cholesterol Total   Date Value Ref Range Status   07/23/2025 187 <=200 mg/dL Final     HDL Cholesterol   Date  Value Ref Range Status   07/23/2025 70 (H) 35 - 60 mg/dL Final     Triglyceride   Date Value Ref Range Status   07/23/2025 107 37 - 140 mg/dL Final     LDL   Date Value Ref Range Status   07/23/2025 96 50 - 140 mg/dL Final     Lab Results   Component Value Date    WBC 5.91 07/23/2025    HGB 12.5 07/23/2025    HCT 39.9 07/23/2025    MCV 96.8 (H) 07/23/2025     07/23/2025     CMP  Sodium   Date Value Ref Range Status   07/23/2025 142 136 - 145 mmol/L Final     Potassium   Date Value Ref Range Status   07/23/2025 4.1 3.5 - 5.1 mmol/L Final     Chloride   Date Value Ref Range Status   07/23/2025 109 (H) 98 - 107 mmol/L Final     CO2   Date Value Ref Range Status   07/23/2025 28 23 - 31 mmol/L Final     Glucose   Date Value Ref Range Status   07/23/2025 88 82 - 115 mg/dL Final     Blood Urea Nitrogen   Date Value Ref Range Status   07/23/2025 20.0 9.8 - 20.1 mg/dL Final     Creatinine   Date Value Ref Range Status   07/23/2025 0.73 0.55 - 1.02 mg/dL Final     Calcium   Date Value Ref Range Status   07/23/2025 9.9 8.4 - 10.2 mg/dL Final     Protein Total   Date Value Ref Range Status   07/23/2025 7.4 5.8 - 7.6 gm/dL Final     Albumin   Date Value Ref Range Status   07/23/2025 3.9 3.4 - 4.8 g/dL Final     Bilirubin Total   Date Value Ref Range Status   07/23/2025 0.9 <=1.5 mg/dL Final     ALP   Date Value Ref Range Status   07/23/2025 71 40 - 150 unit/L Final     AST   Date Value Ref Range Status   07/23/2025 22 11 - 45 unit/L Final     ALT   Date Value Ref Range Status   07/23/2025 21 0 - 55 unit/L Final     eGFR   Date Value Ref Range Status   07/23/2025 >60 mL/min/1.73/m2 Final     Comment:     Estimated GFR calculated using the CKD-EPI creatinine (2021) equation.     Lab Results   Component Value Date    TSH 1.510 07/23/2025         Assessment and Plan    1. Primary hypertension (Primary)  At goal  Continue current medications  Refills provided  Continue discussing lifestyle modification, low sodium diet and  daily physical activity as tolerated  Lab results as above  - Lipid Panel  - TSH  - Comprehensive Metabolic Panel  - CBC Auto Differential    2. Mixed hyperlipidemia  Lipid panel as above  Continue current medication and lifestyle modification    3. Rosacea  Not currently in flare  Medication refilled  - metroNIDAZOLE (METROGEL) 0.75 % gel; Apply topically 2 (two) times daily. Apply to face PRN for rosacea related rash  Dispense: 45 g; Refill: 1    4. Osteopenia of necks of both femurs  Discontinue prolia  Start fosamax 35 mg q7 days  Dexa scan in 1 year, re-evaulate need for fosamax vs starting evenity at that time         Follow up in about 4 months (around 11/23/2025).    Health Maintenance      Immunization History   Administered Date(s) Administered    COVID-19, MRNA, LN-S, PF (Pfizer) (Purple Cap) 02/25/2021, 03/18/2021    Influenza 11/03/2017    Influenza (FLUAD) - Quadrivalent - Adjuvanted - PF *Preferred* (65+) 09/27/2022, 10/31/2023    Influenza - Quadrivalent - High Dose - PF (65 years and older) 10/14/2021    Influenza - Quadrivalent - PF *Preferred* (6 months and older) 12/13/2010, 11/01/2011, 10/04/2012, 10/20/2014, 11/03/2017    Influenza - Trivalent - Fluad - Adjuvanted - PF (65 years and older 10/07/2024    Influenza - Trivalent - Fluzone High Dose - PF (65 years and older) 02/22/2017, 09/28/2018    Pneumococcal Conjugate - 13 Valent 07/22/2015    Pneumococcal Polysaccharide - 23 Valent 09/14/2017    Td (ADULT) 12/04/1986, 05/19/1992, 08/30/2005    Tdap 01/29/2016, 03/18/2025    Zoster Recombinant 10/16/2020, 12/17/2020     Carey Gonzalez DO  [facility] Resident, [facility]

## 2025-07-24 NOTE — PROGRESS NOTES
Discussed with resident at time of encounter 07-23-25.  Pt. seen.  Remains physically active; adherent to prescribed medications.    Resident's note reviewed 07-24-25.  Pt. with hx. of esophageal diverticulum; recommend avoiding Fosamax.  Professional services provided in an outpatient primary care center affiliated with a teaching institution.

## 2025-08-19 DIAGNOSIS — K21.9 GASTROESOPHAGEAL REFLUX DISEASE, UNSPECIFIED WHETHER ESOPHAGITIS PRESENT: ICD-10-CM

## 2025-08-19 RX ORDER — FAMOTIDINE 20 MG/1
20 TABLET, FILM COATED ORAL 2 TIMES DAILY
Qty: 180 TABLET | Refills: 1 | Status: SHIPPED | OUTPATIENT
Start: 2025-08-19

## 2025-08-29 ENCOUNTER — TELEPHONE (OUTPATIENT)
Dept: FAMILY MEDICINE | Facility: CLINIC | Age: 80
End: 2025-08-29
Payer: MEDICARE

## 2025-08-29 DIAGNOSIS — M89.8X1 SHOULDER BLADE PAIN: Primary | ICD-10-CM

## (undated) DEVICE — OMNIPAQUE 350MG 150ML VIAL

## (undated) DEVICE — COVER CIV-FLEX PROBE US 58IN

## (undated) DEVICE — DRAPE RADIAL BRACHIAL 29X42IN

## (undated) DEVICE — INTRODUCER TRNSRADIAL 6F 10CM

## (undated) DEVICE — CATH OPTITORQUE RADIAL 5FR

## (undated) DEVICE — Device

## (undated) DEVICE — WIRE MANDRIL 98/60CM

## (undated) DEVICE — HEMOSTAT VASC BAND REG 24CM

## (undated) DEVICE — GUIDEWIRE STD .035X180CM ANG

## (undated) DEVICE — GUIDEWIRE EMERALD 3MM 175X5CM

## (undated) DEVICE — DRSNG POLYSKIN TRNSPAR 4X4.75

## (undated) DEVICE — NDL BLUNT FILL 18G 1IN